# Patient Record
Sex: MALE | Race: WHITE | NOT HISPANIC OR LATINO | Employment: OTHER | URBAN - METROPOLITAN AREA
[De-identification: names, ages, dates, MRNs, and addresses within clinical notes are randomized per-mention and may not be internally consistent; named-entity substitution may affect disease eponyms.]

---

## 2017-01-05 ENCOUNTER — OFFICE VISIT (OUTPATIENT)
Dept: PODIATRY | Facility: CLINIC | Age: 77
End: 2017-01-05
Payer: COMMERCIAL

## 2017-01-05 VITALS
TEMPERATURE: 98 F | HEIGHT: 66 IN | HEART RATE: 98 BPM | BODY MASS INDEX: 39.9 KG/M2 | WEIGHT: 248.25 LBS | SYSTOLIC BLOOD PRESSURE: 140 MMHG | DIASTOLIC BLOOD PRESSURE: 77 MMHG

## 2017-01-05 DIAGNOSIS — L97.529: Primary | ICD-10-CM

## 2017-01-05 DIAGNOSIS — I73.9 PVD (PERIPHERAL VASCULAR DISEASE): ICD-10-CM

## 2017-01-05 DIAGNOSIS — E11.51 TYPE II DIABETES MELLITUS WITH PERIPHERAL CIRCULATORY DISORDER: ICD-10-CM

## 2017-01-05 PROCEDURE — 99999 PR PBB SHADOW E&M-EST. PATIENT-LVL III: CPT | Mod: PBBFAC,,, | Performed by: PODIATRIST

## 2017-01-05 PROCEDURE — 1157F ADVNC CARE PLAN IN RCRD: CPT | Mod: S$GLB,,, | Performed by: PODIATRIST

## 2017-01-05 PROCEDURE — 1126F AMNT PAIN NOTED NONE PRSNT: CPT | Mod: S$GLB,,, | Performed by: PODIATRIST

## 2017-01-05 PROCEDURE — 3078F DIAST BP <80 MM HG: CPT | Mod: S$GLB,,, | Performed by: PODIATRIST

## 2017-01-05 PROCEDURE — 99213 OFFICE O/P EST LOW 20 MIN: CPT | Mod: 25,S$GLB,, | Performed by: PODIATRIST

## 2017-01-05 PROCEDURE — 3077F SYST BP >= 140 MM HG: CPT | Mod: S$GLB,,, | Performed by: PODIATRIST

## 2017-01-05 PROCEDURE — 11042 DBRDMT SUBQ TIS 1ST 20SQCM/<: CPT | Mod: S$GLB,,, | Performed by: PODIATRIST

## 2017-01-05 PROCEDURE — 1160F RVW MEDS BY RX/DR IN RCRD: CPT | Mod: S$GLB,,, | Performed by: PODIATRIST

## 2017-01-05 PROCEDURE — 1159F MED LIST DOCD IN RCRD: CPT | Mod: S$GLB,,, | Performed by: PODIATRIST

## 2017-01-05 PROCEDURE — 4010F ACE/ARB THERAPY RXD/TAKEN: CPT | Mod: S$GLB,,, | Performed by: PODIATRIST

## 2017-01-05 PROCEDURE — 3044F HG A1C LEVEL LT 7.0%: CPT | Mod: S$GLB,,, | Performed by: PODIATRIST

## 2017-01-05 RX ORDER — HYDROCODONE BITARTRATE AND ACETAMINOPHEN 7.5; 325 MG/1; MG/1
TABLET ORAL
COMMUNITY
Start: 2016-12-28 | End: 2017-02-03

## 2017-01-05 RX ORDER — ERTAPENEM SODIUM 1 G/1
INJECTION, POWDER, LYOPHILIZED, FOR SOLUTION INTRAMUSCULAR; INTRAVENOUS
COMMUNITY
Start: 2016-12-30 | End: 2017-11-30 | Stop reason: ALTCHOICE

## 2017-01-05 RX ORDER — DIPHENHYDRAMINE HYDROCHLORIDE 50 MG/ML
INJECTION INTRAMUSCULAR; INTRAVENOUS
COMMUNITY
Start: 2016-12-27 | End: 2018-10-26

## 2017-01-05 RX ORDER — VANCOMYCIN HYDROCHLORIDE 1 G/200ML
INJECTION, SOLUTION INTRAVENOUS
COMMUNITY
Start: 2016-12-30 | End: 2017-11-30 | Stop reason: ALTCHOICE

## 2017-01-05 NOTE — MR AVS SNAPSHOT
[facility] - Podiatry  [address] [place] [address]  Phone: [phone]  Fax: [phone]                  Raymond Stuart   2017 3:40 PM   Office Visit    Description:  Male : 1940   Provider:  Nic Castillo DPM   Department:  [facility] - Podiatry           Reason for Visit     Foot Ulcer     Diabetes Mellitus                To Do List           Future Appointments        Provider Department Dept Phone    2017 1:30 PM Adele Gomez MD [facility] Dermatology [phone]    2017 3:40 PM Nic Castillo DPM [facility] Podiatry [phone]    2017 7:35 AM LAB[facility]ATORY, [facility] [phone]    2017 7:40 AM SPECIMEN, [facility] [phone]    2017 7:00 AM DELIA Cristobal Jr., MD [facility] Internal Medicine [phone]      Goals (5 Years of Data)     None      [facility]     [facility] On Call Nurse Care Line -  Assistance  Registered nurses in the [facility] On Call Center provide clinical advisement, health education, appointment booking, and other advisory services.  Call for this free service at [phone].             Medications           Message regarding Medications     Verify the changes and/or additions to your medication regime listed below are the same as discussed with your clinician today.  If any of these changes or additions are incorrect, please notify your healthcare provider.             Verify that the below list of medications is an accurate representation of the medications you are currently taking.  If none reported, the list may be blank. If incorrect, please contact your healthcare provider. Carry this list with you in case of emergency.           Current Medications     atenolol (TENORMIN) 100 MG tablet Take 1 tablet by mouth  daily    clopidogrel (PLAVIX) 75 mg tablet Take 1 tablet by mouth  daily    diphenhydrAMINE (BENADRYL) 50 mg/mL injection     fenofibrate micronized (LOFIBRA) 134 MG  "Cap Take 1 capsule (134 mg total) by mouth once daily.    gabapentin (NEURONTIN) 600 MG tablet Take 2 tablets (1,200 mg total) by mouth 2 (two) times daily.    glipizide-metformin (METAGLIP) 2.5-500 mg per tablet Take 2 tablets by mouth 2 (two) times daily before meals. 2 po qam, 1 po qpm    hydrocodone-acetaminophen 7.5-325mg (NORCO) 7.5-325 mg per tablet     INVANZ 1 gram injection     lisinopril-hydrochlorothiazide (PRINZIDE,ZESTORETIC) 20-12.5 mg per tablet Take 2 tablets by mouth once daily.    niacin 1,000 mg TbSR Take 1 tablet by mouth Daily.    rosuvastatin (CRESTOR) 40 MG Tab Take 1 tablet (40 mg total) by mouth once daily.    SANTYL ointment     VANCOMYCIN HCL IN DEXTROSE 5 % (VANCOMYCIN IN DEXTROSE 5 %) 1 gram/200 mL PgBk     warfarin (COUMADIN) 5 MG tablet TAKE 1 AND 1/2 TABLETS BY  MOUTH ON WEDNESDAY AND  SUNDAY AND 1 TABLET ALL  REMAINING DAY AS DIRECTED  BY THE COUMADIN CLINIC.           Clinical Reference Information           Vital Signs - Last Recorded  Most recent update: 1/5/2017  4:06 PM by Cheo Dowd LPN    BP Pulse Temp Ht Wt BMI    (!) 140/77 (BP Location: Right arm, Patient Position: Sitting, BP Method: Automatic) 98 98.3 °F (36.8 °C) (Oral) 5' 6" (1.676 m) 112.6 kg (248 lb 3.8 oz) 40.07 kg/m2      Blood Pressure          Most Recent Value    BP  (!)  140/77      Allergies as of 1/5/2017     Sulfa (Sulfonamide Antibiotics)    Sulfamethoxazole    Trimethoprim      Immunizations Administered on Date of Encounter - 1/5/2017     None      [facility] Sign-Up     Activating your [facility] account is as easy as 1-2-3!     1) Visit [email or web address], select Sign Up Now, enter this activation code and your date of birth, then select Next.  [email or web address]  Expires: 2/19/2017  4:36 PM      2) Create a username and password to use when you visit [facility] in the future and select a security question in case you lose your password and select Next.    3) Enter your e-mail address and click Sign " Up!    Additional Information  If you have questions, please e-mail [email or web address] or call [phone] to talk to our [facility] staff. Remember, [facility] is NOT to be used for urgent needs. For medical emergencies, dial 911.

## 2017-01-05 NOTE — PROGRESS NOTES
Subjective: This 76 year old male presents today for evaluation of ulcer left foot. he noticed ulcer several weeks ago. Patient states he has been using applying sanytl ,and patient feels it is working. he states that his blood sugar was 111mg/dl when he checked it prior to coming into clinic.  Patient has no other pedal complaints.  HPI: Patient is accompanied by his daughter today. Patient states he was seen by Dr. Gibson and referred to me. Patient states home health is coming out and changing dressing. Patient states he  had procedure with Dr. Judge performed on December 22.      Chief Complaint   Patient presents with    Foot Ulcer     Left foot( top of 5th toe and bottom of foot). Patient states no current pain. Both uclers appear to be open.     Diabetes Mellitus     Last PCP visit with -11/16/16 . 111 mg/dL-Today's glucose.     PCP: Dr. Cristobal(11/16/16)    Patient Active Problem List   Diagnosis    Hyperlipidemia associated with type 2 diabetes mellitus    Essential hypertension    A-fib    PVD (peripheral vascular disease)    Special screening for malignant neoplasms, colon    S/P femoral-popliteal bypass surgery    S/P peripheral artery angioplasty    Type 2 diabetes mellitus with microalbuminuria    Type 2 diabetes mellitus with diabetic neuropathy       Current Outpatient Prescriptions on File Prior to Visit   Medication Sig Dispense Refill    atenolol (TENORMIN) 100 MG tablet Take 1 tablet by mouth  daily 90 tablet 3    clopidogrel (PLAVIX) 75 mg tablet Take 1 tablet by mouth  daily 90 tablet 3    fenofibrate micronized (LOFIBRA) 134 MG Cap Take 1 capsule (134 mg total) by mouth once daily. 90 capsule 3    gabapentin (NEURONTIN) 600 MG tablet Take 2 tablets (1,200 mg total) by mouth 2 (two) times daily. 180 tablet 3    glipizide-metformin (METAGLIP) 2.5-500 mg per tablet Take 2 tablets by mouth 2 (two) times daily before meals. 2 po qam, 1 po qpm 360 tablet 3     "lisinopril-hydrochlorothiazide (PRINZIDE,ZESTORETIC) 20-12.5 mg per tablet Take 2 tablets by mouth once daily. 180 tablet 3    niacin 1,000 mg TbSR Take 1 tablet by mouth Daily.      rosuvastatin (CRESTOR) 40 MG Tab Take 1 tablet (40 mg total) by mouth once daily. 90 tablet 3    SANTYL ointment       warfarin (COUMADIN) 5 MG tablet TAKE 1 AND 1/2 TABLETS BY  MOUTH ON WEDNESDAY AND  SUNDAY AND 1 TABLET ALL  REMAINING DAY AS DIRECTED  BY THE COUMADIN CLINIC. (Patient taking differently: TAKE 1 tablet daily AS DIRECTED  BY THE COUMADIN CLINIC.) 96 tablet 11     No current facility-administered medications on file prior to visit.        Review of patient's allergies indicates:   Allergen Reactions    Sulfa (sulfonamide antibiotics)      Other reaction(s): Stomach upset    Sulfamethoxazole Rash    Trimethoprim Rash       Past Surgical History   Procedure Laterality Date    Rectoperitoneal fistula closure      Tonsillectomy      Toe surgery       Joint release    Popliteal artery stent  2013    Cardiac electrophysiology mapping and ablation         History reviewed. No pertinent family history.    Social History     Social History    Marital status:      Spouse name: N/A    Number of children: N/A    Years of education: N/A     Occupational History    Retired [employer]     Social History Main Topics    Smoking status: Former Smoker     Packs/day: 4.00     Years: 30.00     Types: Cigarettes     Quit date: 1/9/1994    Smokeless tobacco: Never Used    Alcohol use No    Drug use: No    Sexual activity: Not on file     Other Topics Concern    Not on file     Social History Narrative     Vitals:    01/05/17 1559   BP: (!) 140/77   Pulse: 98   Temp: 98.3 °F (36.8 °C)   TempSrc: Oral   Weight: 112.6 kg (248 lb 3.8 oz)   Height: 5' 6" (1.676 m)   PainSc: 0-No pain     Hemoglobin A1C   Date Value Ref Range Status   11/09/2016 6.9 (H) 4.5 - 6.2 % Final     Comment:     According to ADA " guidelines, hemoglobin A1C <7.0% represents  optimal control in non-pregnant diabetic patients.  Different  metrics may apply to specific populations.   Standards of Medical Care in Diabetes - 2016.  For the purpose of screening for the presence of diabetes:  <5.7%     Consistent with the absence of diabetes  5.7-6.4%  Consistent with increasing risk for diabetes   (prediabetes)  >or=6.5%  Consistent with diabetes  Currently no consensus exists for use of hemoglobin A1C  for diagnosis of diabetes for children.     08/10/2016 8.1 (H) 4.5 - 6.2 % Final     Comment:     According to ADA guidelines, hemoglobin A1C <7.0% represents  optimal control in non-pregnant diabetic patients.  Different  metrics may apply to specific populations.   Standards of Medical Care in Diabetes - 2016.  For the purpose of screening for the presence of diabetes:  <5.7%     Consistent with the absence of diabetes  5.7-6.4%  Consistent with increasing risk for diabetes   (prediabetes)  >or=6.5%  Consistent with diabetes  Currently no consensus exists for use of hemoglobin A1C  for diagnosis of diabetes for children.     05/09/2016 8.0 (H) 4.5 - 6.2 % Final     REVIEW OF SYSTEMS:  General: Denies any fever or chills  Chest: Denies shortness of breath, wheezing, coughing, or sputum production  Heart: Denies chest pain, cold extremities, orthopenia, or reduced exercise tolerance    OBJECTIVE:   PHYSICAL EXAM: Apperance: Alert and orient in no distress,well developed, and with good attention to grooming and body habits  Lower Extremity Exam  VASCULAR: Dorsalis pedis pulses 0/4 bilateral and Posterior Tibial pulses 0/4 bilateral. Capillary fill time <4 seconds bilateral. No edema observed bilateral. Varicosities present bilateral. Skin temperature of the lower extremities is warm to cool, proximal to distal. Hair growth absent bilateral. (--) lymphangitis or (--) cellulitis noted left.  DERMATOLOGICAL: (--) edema, (+) periwound erythema, (--)  malodor, (+) serous drainage, (--) warmth to left foot.  Ulcer noted to left dorsal 5th toe and plantar 5th submetatarsal with mixed eschar base and thin granular rim and with a 1 mm yellow/white border and hyperkeratosis surrounding ulcer. Left sub-fifth met head 1.0 x 0.5 x 0. 2 cm down to dry fibrotic tissue base and left dorsal lateral fifth digit 0.5 x 1.0 x 0.3 cm. The ulcer does not extend into deeper tissue and (--) sinus tracts exist.  The dorsum surface of the feet are soft and supple.  The plantar aspects of both feet are dry and scaly. Nails 1-5 bilateral dystrophic, mycotic. Webspaces 1-4 clean, dry and without evidence of break in skin integrity bilateral.   NEUROLOGICAL: Light touch, sharp-dull, proprioception all present and equal bilaterally. Protective sensation absent at sites as tested with a Erie-Jabari 5.07 monofilament.   MUSCULOSKELETAL: Muscle strength is 5/5 for foot inverters, everters, plantarflexors, and dorsiflexors. Muscle tone is normal.  Inspection/palpation of bone, joints and muscles unremarkable with the exception of that noted above.            ASSESSMENT:  1.Ischemic/Neurotrophic ulceration of left  foot   - without signs of infection  2. Diabetes mellitus with PVD  3. Mycotic toenails, bilateral    TREATMENT/PLAN: Treatment today consisted of the followin. The patient was counseled regarding findings of my examination, my impressions, treatment options, results of diagnostic tests and usual treatment plan.   2. With patient's permission, the left foot ulcer was sharply excisionally debrided of viable and nonviable tissue down to good bleeding granular tissue base utilizing sterile #15 blade and tissue nipper and forceps. Wound was irrigated with sterile saline and bleeding was controlled with direct pressure. Minimal blood loss. The patient tolerated this well. Wound was then dressed with normlgel, gauze, kerlix, ACE. Patient was given instructions on daily dressing  changes. Patient was also instructed on the importance of keeping the wound and dressings clean dry and intact and keeping pressure off the wound area until complete healing of the wound.The patient is alerted to watch for any signs of infection (redness, pus, pain, increased swelling or fever) and call if such occurs. Home wound care instructions are provided.  3. Patient instructed to continue with Santyl and home health.   4,. Patient instructed to continue IV antibiotics as per Dr. Hernández with infectious disease.   5. Shoe inspection. Diabetic Foot Education. Patient reminded of the importance of good nutrition and blood sugar control to help prevent podiatric complications of diabetes. Patient instructed on proper foot hygeine. We discussed wearing proper shoe gear, daily foot inspections, never walking without protective shoe gear, never putting sharp instruments to feet.    6. Patient  will continue to monitor the areas daily, inspect feet, wear protective shoe gear when ambulatory, moisturizer to maintain skin integrity. Patient reminded of the importance of good nutrition and blood sugar control to help prevent podiatric complications of diabetes.  7. Patient to return in 2 weeks or sooner if needed.

## 2017-01-09 LAB — INR PPP: 3.1

## 2017-01-10 ENCOUNTER — ANTI-COAG VISIT (OUTPATIENT)
Dept: CARDIOLOGY | Facility: CLINIC | Age: 77
End: 2017-01-10

## 2017-01-10 DIAGNOSIS — I48.20 CHRONIC ATRIAL FIBRILLATION: ICD-10-CM

## 2017-01-10 NOTE — PROGRESS NOTES
Verbal result taken from __Susy/Kami _______. PT/INR __31.9/3.1_____ Date drawn__1/19_____ patient remains on IV vancomycin and invanz until February. No other reported changes. Begin 2.5mg on Tuesdays and Thursdays and 5mg all other days. Repeat INR in 3 weeks. Orders faxed. Left message for patient with this information.

## 2017-01-16 ENCOUNTER — INITIAL CONSULT (OUTPATIENT)
Dept: DERMATOLOGY | Facility: CLINIC | Age: 77
End: 2017-01-16
Payer: COMMERCIAL

## 2017-01-16 DIAGNOSIS — Z85.828 HISTORY OF SKIN CANCER: ICD-10-CM

## 2017-01-16 DIAGNOSIS — L57.0 ACTINIC KERATOSES: ICD-10-CM

## 2017-01-16 DIAGNOSIS — L90.5 SCAR CONDITIONS/SKIN FIBROSIS: Primary | ICD-10-CM

## 2017-01-16 DIAGNOSIS — Z12.83 SCREENING, MALIGNANT NEOPLASM, SKIN: ICD-10-CM

## 2017-01-16 DIAGNOSIS — D48.5 NEOPLASM OF UNCERTAIN BEHAVIOR OF SKIN: ICD-10-CM

## 2017-01-16 PROCEDURE — 99202 OFFICE O/P NEW SF 15 MIN: CPT | Mod: 25,S$GLB,, | Performed by: DERMATOLOGY

## 2017-01-16 PROCEDURE — 88305 TISSUE EXAM BY PATHOLOGIST: CPT | Performed by: PATHOLOGY

## 2017-01-16 PROCEDURE — 11100 PR BIOPSY OF SKIN LESION: CPT | Mod: 59,S$GLB,, | Performed by: DERMATOLOGY

## 2017-01-16 PROCEDURE — 88305 TISSUE EXAM BY PATHOLOGIST: CPT | Mod: 26,,, | Performed by: PATHOLOGY

## 2017-01-16 PROCEDURE — 1160F RVW MEDS BY RX/DR IN RCRD: CPT | Mod: S$GLB,,, | Performed by: DERMATOLOGY

## 2017-01-16 PROCEDURE — 1157F ADVNC CARE PLAN IN RCRD: CPT | Mod: S$GLB,,, | Performed by: DERMATOLOGY

## 2017-01-16 PROCEDURE — 17004 DESTROY PREMAL LESIONS 15/>: CPT | Mod: S$GLB,,, | Performed by: DERMATOLOGY

## 2017-01-16 PROCEDURE — 99999 PR PBB SHADOW E&M-EST. PATIENT-LVL II: CPT | Mod: PBBFAC,,, | Performed by: DERMATOLOGY

## 2017-01-16 PROCEDURE — 1159F MED LIST DOCD IN RCRD: CPT | Mod: S$GLB,,, | Performed by: DERMATOLOGY

## 2017-01-16 NOTE — MR AVS SNAPSHOT
[facility] Dermatology  [address] [place] [address]  Phone: [phone]  Fax: [phone]                  Raymond Stuart   2017 1:30 PM   Initial consult    Description:  Male : 1940   Provider:  Adele Gomez MD   Department:  [facility] - Dermatology           Reason for Visit     Spot           Diagnoses this Visit        Comments    Scar conditions/skin fibrosis    -  Primary     Screening, malignant neoplasm, skin         History of skin cancer         Actinic keratoses         Neoplasm of uncertain behavior of skin                To Do List           Future Appointments        Provider Department Dept Phone    2017 3:40 PM Nic Castillo DPM [facility] Podiatry [phone]    2017 7:35 AM LAB[facility]ATORY, [facility] [phone]    2017 7:40 AM SPECIMEN, [facility] [phone]    2017 7:00 AM DELIA Cristobal Jr., MD [facility] Internal Medicine [phone]    2017 8:45 AM TAY Long OD [facility] Ophthalmology [phone]      Goals (5 Years of Data)     None      Follow-Up and Disposition     Return for call for results.    Follow-up and Disposition History      [facility]     [facility] Nurse Care Line - 24/7 Assistance  Registered nurses in the [facility] Center provide clinical advisement, health education, appointment booking, and other advisory services.  Call for this free service at [phone].             Medications           Message regarding Medications     Verify the changes and/or additions to your medication regime listed below are the same as discussed with your clinician today.  If any of these changes or additions are incorrect, please notify your healthcare provider.             Verify that the below list of medications is an accurate representation of the medications you are currently taking.  If none reported, the list may be blank. If incorrect, please contact your  healthcare provider. Carry this list with you in case of emergency.           Current Medications     atenolol (TENORMIN) 100 MG tablet Take 1 tablet by mouth  daily    clopidogrel (PLAVIX) 75 mg tablet Take 1 tablet by mouth  daily    diphenhydrAMINE (BENADRYL) 50 mg/mL injection     fenofibrate micronized (LOFIBRA) 134 MG Cap Take 1 capsule (134 mg total) by mouth once daily.    gabapentin (NEURONTIN) 600 MG tablet Take 2 tablets (1,200 mg total) by mouth 2 (two) times daily.    glipizide-metformin (METAGLIP) 2.5-500 mg per tablet Take 2 tablets by mouth 2 (two) times daily before meals. 2 po qam, 1 po qpm    hydrocodone-acetaminophen 7.5-325mg (NORCO) 7.5-325 mg per tablet     INVANZ 1 gram injection     lisinopril-hydrochlorothiazide (PRINZIDE,ZESTORETIC) 20-12.5 mg per tablet Take 2 tablets by mouth once daily.    niacin 1,000 mg TbSR Take 1 tablet by mouth Daily.    rosuvastatin (CRESTOR) 40 MG Tab Take 1 tablet (40 mg total) by mouth once daily.    SANTYL ointment     VANCOMYCIN HCL IN DEXTROSE 5 % (VANCOMYCIN IN DEXTROSE 5 %) 1 gram/200 mL PgBk     warfarin (COUMADIN) 5 MG tablet TAKE 1 AND 1/2 TABLETS BY  MOUTH ON WEDNESDAY AND  SUNDAY AND 1 TABLET ALL  REMAINING DAY AS DIRECTED  BY THE COUMADIN CLINIC.           Clinical Reference Information           Allergies as of 1/16/2017     Sulfa (Sulfonamide Antibiotics)    Sulfamethoxazole    Trimethoprim      Immunizations Administered on Date of Encounter - 1/16/2017     None      Orders Placed During Today's Visit      Normal Orders This Visit    Tissue Specimen To Pathology, Dermatology       [facility] Sign-Up     Activating your [facility] account is as easy as 1-2-3!     1) Visit [email or web address], select Sign Up Now, enter this activation code and your date of birth, then select Next.  [email or web address]  Expires: 2/19/2017  4:36 PM      2) Create a username and password to use when you visit [facility] in the future and select a security question in case you  lose your password and select Next.    3) Enter your e-mail address and click Sign Up!    Additional Information  If you have questions, please e-mail [email or web address] or call [phone] to talk to our [facility] staff. Remember, [facility] is NOT to be used for urgent needs. For medical emergencies, dial 911.         Instructions     Shave Biopsy Wound Care    Your doctor has performed a shave biopsy today.  A band aid and vaseline ointment has been placed over the site.  This should remain in place for 24 hours.  It is recommended that you keep the area dry for the first 24 hours.  After 24 hours, you may remove the band aid and wash the area with warm soap and water and apply Vaseline jelly.  Many patients prefer to use Neosporin or Bacitracin ointment.  This is acceptable; however, know that you can develop an allergy to this medication even if you have used it safely for years.  It is important to keep the area moist.  Letting it dry out and get air slows healing time, and will worsen the scar.  Band aid is optional after first 24 hours.      If you notice increasing redness, tenderness, pain, or yellow drainage at the biopsy site, please notify your doctor.  These are signs of an infection.    If your biopsy site is bleeding, apply firm pressure for 15 minutes straight.  Repeat for another 15 minutes, if it is still bleeding.   If the surgical site continues to bleed, then please contact your doctor.      [facility]   DERMATOLOGY  [address]   [facility] [address]   Dept: [phone]   Dept Fax: [phone]         Preventing Skin Cancer  Relaxing in the sun may feel good. But it isnt good for your skin. In fact, being exposed to the suns harmful rays is a major cause of skin cancer. This is a serious disease that can be life-threatening. People of all ages and backgrounds are at risk. But in most cases, skin cancer can be prevented.    Your Role in  Prevention  You can act today to help prevent skin cancer. Start by avoiding the suns UV (ultraviolet) rays. And dont use tanning beds, which are no safer than the sun. Taking these steps can help keep you from getting skin cancer. It can also help prevent wrinkles and other sun-induced aging effects. Make sure your children also follow these safeguards. Now is the time to start taking preventive steps against skin cancer.  When You Are Outdoors  Protect your skin when you go outdoors during the day. Take precautions whenever you go out to eat, run errands by car or on foot, or do any outdoor activity. There isnt just one easy way to protect your skin. Its best to follow all of these steps:  · Wear tightly woven clothing that covers your skin. Put on a wide-brimmed hat to protect your face, ears, and scalp.  · Watch the clock. Try to avoid the sun between 10 a.m. and 4 p.m., when it is strongest.  · Head for the shade or create your own. Use an umbrella when sitting or strolling.  · Know that the suns rays can reflect off sand, water, and snow. This can harm your skin. Take extra care when you are near reflective surfaces.  · Keep in mind that even when the weather is hazy or cloudy, your skin can be exposed to strong UV rays.  · Shield your skin with sunscreen. Also, apply sunscreen to your childrens skin.  Tips for Using Sunscreen  To help prevent skin cancer, choose the right sunscreen and use it correctly. Try the following tips:  · Choose a sunscreen that has a sun protection factor (SPF) of at least 15. For the best protection, an SPF of at least 30 is preferred. Also, choose a sunscreen labeled broad spectrum. This will shield you from both UVA and UVB (ultraviolet A and B) rays.  · If one brand irritates your skin, try another, particularly ones without fragrance.  · Use a water-resistant sunscreen if swimming or sweating.  · Reapply sunscreen every 2 hours. If youre active, do this more  often.  · Cover any sun-exposed skin, from your face to your feet. Dont forget your ears and your lips.  · Know that while sunscreen helps protect you, it isnt enough. You should also wear protective clothing. And try to stay out of the sun as much as you can, especially from 10 a.m. to 4 p.m.  © 3231-6570 [employer]. [address] [place] [address], [place] [address]. All rights reserved. This information is not intended as a substitute for professional medical care. Always follow your healthcare professional's instructions.

## 2017-01-16 NOTE — PATIENT INSTRUCTIONS
Shave Biopsy Wound Care    Your doctor has performed a shave biopsy today.  A band aid and vaseline ointment has been placed over the site.  This should remain in place for 24 hours.  It is recommended that you keep the area dry for the first 24 hours.  After 24 hours, you may remove the band aid and wash the area with warm soap and water and apply Vaseline jelly.  Many patients prefer to use Neosporin or Bacitracin ointment.  This is acceptable; however, know that you can develop an allergy to this medication even if you have used it safely for years.  It is important to keep the area moist.  Letting it dry out and get air slows healing time, and will worsen the scar.  Band aid is optional after first 24 hours.      If you notice increasing redness, tenderness, pain, or yellow drainage at the biopsy site, please notify your doctor.  These are signs of an infection.    If your biopsy site is bleeding, apply firm pressure for 15 minutes straight.  Repeat for another 15 minutes, if it is still bleeding.   If the surgical site continues to bleed, then please contact your doctor.      [facility]   DERMATOLOGY  [address] [address]   Dept: [phone]   Dept Fax: [phone]         Preventing Skin Cancer  Relaxing in the sun may feel good. But it isnt good for your skin. In fact, being exposed to the suns harmful rays is a major cause of skin cancer. This is a serious disease that can be life-threatening. People of all ages and backgrounds are at risk. But in most cases, skin cancer can be prevented.    Your Role in Prevention  You can act today to help prevent skin cancer. Start by avoiding the suns UV (ultraviolet) rays. And dont use tanning beds, which are no safer than the sun. Taking these steps can help keep you from getting skin cancer. It can also help prevent wrinkles and other sun-induced aging effects. Make sure your children also follow  these safeguards. Now is the time to start taking preventive steps against skin cancer.  When You Are Outdoors  Protect your skin when you go outdoors during the day. Take precautions whenever you go out to eat, run errands by car or on foot, or do any outdoor activity. There isnt just one easy way to protect your skin. Its best to follow all of these steps:  · Wear tightly woven clothing that covers your skin. Put on a wide-brimmed hat to protect your face, ears, and scalp.  · Watch the clock. Try to avoid the sun between 10 a.m. and 4 p.m., when it is strongest.  · Head for the shade or create your own. Use an umbrella when sitting or strolling.  · Know that the suns rays can reflect off sand, water, and snow. This can harm your skin. Take extra care when you are near reflective surfaces.  · Keep in mind that even when the weather is hazy or cloudy, your skin can be exposed to strong UV rays.  · Shield your skin with sunscreen. Also, apply sunscreen to your childrens skin.  Tips for Using Sunscreen  To help prevent skin cancer, choose the right sunscreen and use it correctly. Try the following tips:  · Choose a sunscreen that has a sun protection factor (SPF) of at least 15. For the best protection, an SPF of at least 30 is preferred. Also, choose a sunscreen labeled broad spectrum. This will shield you from both UVA and UVB (ultraviolet A and B) rays.  · If one brand irritates your skin, try another, particularly ones without fragrance.  · Use a water-resistant sunscreen if swimming or sweating.  · Reapply sunscreen every 2 hours. If youre active, do this more often.  · Cover any sun-exposed skin, from your face to your feet. Dont forget your ears and your lips.  · Know that while sunscreen helps protect you, it isnt enough. You should also wear protective clothing. And try to stay out of the sun as much as you can, especially from 10 a.m. to 4 p.m.  © 0108-4031 [employer]. [address] [place] [address]  [place] [address]. All rights reserved. This information is not intended as a substitute for professional medical care. Always follow your healthcare professional's instructions.

## 2017-01-16 NOTE — PROGRESS NOTES
Subjective:       Patient ID:  Raymond Stuart is a 76 y.o. male who presents for   Chief Complaint   Patient presents with    Spot     c/o spot on left forearm x several yrs      HPI Comments: Hx of NMSC of the right ear and left cheek    History of Present Illness: The patient presents with chief complaint of lesions.  Location: left forearm  Duration: 1 year  Signs/Symptoms: crusted, painful    Prior treatments: none      Spot         Review of Systems   Constitutional: Negative for fever and chills.   Gastrointestinal: Negative for nausea and vomiting.   Skin: Negative for daily sunscreen use, activity-related sunscreen use and recent sunburn.   Hematologic/Lymphatic: Does not bruise/bleed easily.        Objective:    Physical Exam   Constitutional: He appears well-developed and well-nourished. No distress.   Neurological: He is alert and oriented to person, place, and time. He is not disoriented.   Psychiatric: He has a normal mood and affect.   Skin:   Areas Examined (abnormalities noted in diagram):   Scalp / Hair Palpated and Inspected  Head / Face Inspection Performed  Neck Inspection Performed  Chest / Axilla Inspection Performed  Abdomen Inspection Performed  Back Inspection Performed  RUE Inspected  LUE Inspection Performed  Nails and Digits Inspection Performed                       Diagram Legend     Erythematous scaling macule/papule c/w actinic keratosis       Pigmented verrucoid papule/plaque c/w seborrheic keratosis      Surgical scar with no sign of skin cancer recurrence                   Assessment / Plan:      Pathology Orders:      Normal Orders This Visit    Tissue Specimen To Pathology, Dermatology     Questions:    Directional Terms:  Other(comment)    Clinical information:  ulcerated BCC vs. SCC    Specific Site:  left temple        Scar conditions/skin fibrosis  Screening, malignant neoplasm, skin  History of skin cancer  Scar of the right ear, hx of NMSC.  No evidence of recurrence on  physical exam today.  Continue routine skin surveillance. Daily sunscreen advised.    Actinic keratoses  Cryosurgery Procedure Note    The patient is informed of the precancerous quality and need for treatment of these lesions. After risks, benefits and alternatives explained, including blistering, pain, hyper- and hypopigmentation, patient verbally consents to cryotherapy to precancerous lesions. Liquid nitrogen cryosurgery is applied to the 36 actinic keratoses, as detailed in the physical exam, to produce a freeze injury. The patient is aware that blisters may form and is instructed on wound care with gentle cleansing and use of vaseline ointment to keep moist until healed. The patient is supplied a handout on cryosurgery and is instructed to call if lesions do not completely resolve.    Neoplasm of uncertain behavior of skin  -     Tissue Specimen To Pathology, Dermatology  -     Shave biopsy(-ies) done of 1 site(s).   Patient informed to call for results within 2 weeks if have not received notification via telephone call or [facility]           Return for call for results.     PROCEDURE NOTE - SHAVE BIOPSY   Location: left temple    After risk, benefits, and alternatives were discussed with the patient, the patient agrees to the procedure by verbal informed consent.  The area(s) were cleansed with alcohol. 2 cc of lidocaine 1% with epinephrine was injected for local anesthesia into each lesion(s).  A sharp dermablade was used to remove part or all of the lesion(s).  The specimen(s) will be sent for tissue pathology.  Hemostasis was obtained with aluminum chloride and/or hyfrecation.  The area(s) were dressed with vaseline ointment and bandaged.  The patient tolerated the procedure well without adverse events.  Wound care instructions were given to the patient on the AVS.  The patient will be notified of pathology results once available. Results will also be available in Epic.

## 2017-01-16 NOTE — LETTER
January 16, 2017      Nic Castillo DPM  [address] [place] [address]           [facility]at[facility]  [address] [place] [address]  Phone: [phone]  Fax: [phone]          Patient: Raymond Stuart   MR Number: [id number]   YOB: 1940   Date of Visit: 1/16/2017       Dear Dr. Nic Castillo:    Thank you for referring Raymond Stuart to me for evaluation. Attached you will find relevant portions of my assessment and plan of care.    If you have questions, please do not hesitate to call me. I look forward to following Raymond Stuart along with you.    Sincerely,    Adele Gomez MD    Enclosure  CC:  No Recipients    If you would like to receive this communication electronically, please contact [email or web address] or [phone] to request more information on [employer] Link access.    For providers and/or their staff who would like to refer a patient to [facility], please contact us through our one-stop-shop provider referral line, [facility], at [phone].    If you feel you have received this communication in error or would no longer like to receive these types of communications, please e-mail [email or web address]

## 2017-01-20 ENCOUNTER — OFFICE VISIT (OUTPATIENT)
Dept: PODIATRY | Facility: CLINIC | Age: 77
End: 2017-01-20
Payer: COMMERCIAL

## 2017-01-20 VITALS
DIASTOLIC BLOOD PRESSURE: 69 MMHG | BODY MASS INDEX: 39.86 KG/M2 | WEIGHT: 248 LBS | HEART RATE: 90 BPM | HEIGHT: 66 IN | SYSTOLIC BLOOD PRESSURE: 125 MMHG

## 2017-01-20 DIAGNOSIS — L97.529: Primary | ICD-10-CM

## 2017-01-20 DIAGNOSIS — E11.51 TYPE II DIABETES MELLITUS WITH PERIPHERAL CIRCULATORY DISORDER: ICD-10-CM

## 2017-01-20 DIAGNOSIS — I73.9 PVD (PERIPHERAL VASCULAR DISEASE): ICD-10-CM

## 2017-01-20 PROCEDURE — 99999 PR PBB SHADOW E&M-EST. PATIENT-LVL III: CPT | Mod: PBBFAC,,, | Performed by: PODIATRIST

## 2017-01-20 PROCEDURE — 11042 DBRDMT SUBQ TIS 1ST 20SQCM/<: CPT | Mod: S$GLB,,, | Performed by: PODIATRIST

## 2017-01-20 PROCEDURE — 99499 UNLISTED E&M SERVICE: CPT | Mod: S$GLB,,, | Performed by: PODIATRIST

## 2017-01-20 NOTE — MR AVS SNAPSHOT
[facility] - Podiatry  [address] [place] [address]  Phone: [phone]  Fax: [phone]                  Raymond Stuart   2017 3:40 PM   Office Visit    Description:  Male : 1940   Provider:  Nic Castillo DPM   Department:  [facility] - Podiatry           Reason for Visit     Foot Ulcer     Diabetes Mellitus                To Do List           Future Appointments        Provider Department Dept Phone    2/3/2017 3:40 PM Nic Castillo DPM [facility] Podiatry [phone]    2017 7:35 AM LABORATORY, [facility] [phone]    2017 7:40 AM SPECIMEN, [facility] [phone]    2017 7:00 AM DELIA Cristobal Jr., MD [facility] Internal Medicine [phone]    2017 8:45 AM TAY Long OD [facility] Ophthalmology [phone]      Goals (5 Years of Data)     None      [facility] On Call     [facility] On [facility] Nurse Care Line -  Assistance  Registered nurses in the [facility] Center provide clinical advisement, health education, appointment booking, and other advisory services.  Call for this free service at [phone].             Medications           Message regarding Medications     Verify the changes and/or additions to your medication regime listed below are the same as discussed with your clinician today.  If any of these changes or additions are incorrect, please notify your healthcare provider.             Verify that the below list of medications is an accurate representation of the medications you are currently taking.  If none reported, the list may be blank. If incorrect, please contact your healthcare provider. Carry this list with you in case of emergency.           Current Medications     atenolol (TENORMIN) 100 MG tablet Take 1 tablet by mouth  daily    clopidogrel (PLAVIX) 75 mg tablet Take 1 tablet by mouth  daily    diphenhydrAMINE (BENADRYL) 50 mg/mL injection     fenofibrate micronized (LOFIBRA) 134  "MG Cap Take 1 capsule (134 mg total) by mouth once daily.    gabapentin (NEURONTIN) 600 MG tablet Take 2 tablets (1,200 mg total) by mouth 2 (two) times daily.    glipizide-metformin (METAGLIP) 2.5-500 mg per tablet Take 2 tablets by mouth 2 (two) times daily before meals. 2 po qam, 1 po qpm    hydrocodone-acetaminophen 7.5-325mg (NORCO) 7.5-325 mg per tablet     INVANZ 1 gram injection     lisinopril-hydrochlorothiazide (PRINZIDE,ZESTORETIC) 20-12.5 mg per tablet Take 2 tablets by mouth once daily.    niacin 1,000 mg TbSR Take 1 tablet by mouth Daily.    rosuvastatin (CRESTOR) 40 MG Tab Take 1 tablet (40 mg total) by mouth once daily.    SANTYL ointment     VANCOMYCIN HCL IN DEXTROSE 5 % (VANCOMYCIN IN DEXTROSE 5 %) 1 gram/200 mL PgBk     warfarin (COUMADIN) 5 MG tablet TAKE 1 AND 1/2 TABLETS BY  MOUTH ON WEDNESDAY AND  SUNDAY AND 1 TABLET ALL  REMAINING DAY AS DIRECTED  BY THE COUMADIN CLINIC.           Clinical Reference Information           Vital Signs - Last Recorded  Most recent update: 1/20/2017  3:58 PM by Cheo Dowd LPN    BP Pulse Ht Wt BMI    125/69 (BP Location: Right arm, Patient Position: Sitting, BP Method: Automatic) 90 5' 6" (1.676 m) 112.5 kg (248 lb 0.3 oz) 40.03 kg/m2      Blood Pressure          Most Recent Value    BP  125/69      Allergies as of 1/20/2017     Sulfa (Sulfonamide Antibiotics)    Sulfamethoxazole    Trimethoprim      Immunizations Administered on Date of Encounter - 1/20/2017     None      [facility] Sign-Up     Activating your [facility] account is as easy as 1-2-3!     1) Visit [email or web address], select Sign Up Now, enter this activation code and your date of birth, then select Next.  [email or web address]  Expires: 2/19/2017  4:36 PM      2) Create a username and password to use when you visit [facility] in the future and select a security question in case you lose your password and select Next.    3) Enter your e-mail address and click Sign Up!    Additional Information  If " you have questions, please e-mail [email or web address] or call [phone] to talk to our [facility] staff. Remember, [facility] is NOT to be used for urgent needs. For medical emergencies, dial 911.

## 2017-01-23 LAB — INR PPP: 2.1

## 2017-01-24 ENCOUNTER — ANTI-COAG VISIT (OUTPATIENT)
Dept: CARDIOLOGY | Facility: CLINIC | Age: 77
End: 2017-01-24

## 2017-01-24 DIAGNOSIS — I48.20 CHRONIC ATRIAL FIBRILLATION: ICD-10-CM

## 2017-01-24 NOTE — PROGRESS NOTES
Faxed result taken from __Kika/Kami _______. PT/INR __24.2/2.19_____ Date drawn___1/23_____ Continue dose. Repeat INR in 4 weeks. Orders faxed.

## 2017-01-25 NOTE — PROGRESS NOTES
Subjective: This 76 year old male presents today for evaluation of ulcer left foot. he noticed ulcer several weeks ago. Patient states he has been using applying santyl ,and patient feels it is working. he states that his blood sugar was 117mg/dl when he checked it prior to coming into clinic.  Patient has no other pedal complaints.  HPI: Patient is accompanied by his daughter today. Patient states he was seen by Dr. Gibson and referred to me. Patient states home health is coming out and changing dressing. Patient states he  had procedure with Dr. Judge performed on December 22. Patient to see Dr. Judge this coming Monday.      Chief Complaint   Patient presents with    Foot Ulcer     Left foot ulcer (top of 5th toe/bottom lateral side). Patient rates the current pain 5/10..    Diabetes Mellitus     Last PCP visit with -11/16/16 . 117 mg/dL-today's glucose.      PCP: Dr. Cristobal(11/16/16)    Patient Active Problem List   Diagnosis    Hyperlipidemia associated with type 2 diabetes mellitus    Essential hypertension    A-fib    PVD (peripheral vascular disease)    Special screening for malignant neoplasms, colon    S/P femoral-popliteal bypass surgery    S/P peripheral artery angioplasty    Type 2 diabetes mellitus with microalbuminuria    Type 2 diabetes mellitus with diabetic neuropathy       Current Outpatient Prescriptions on File Prior to Visit   Medication Sig Dispense Refill    atenolol (TENORMIN) 100 MG tablet Take 1 tablet by mouth  daily 90 tablet 3    clopidogrel (PLAVIX) 75 mg tablet Take 1 tablet by mouth  daily 90 tablet 3    diphenhydrAMINE (BENADRYL) 50 mg/mL injection       fenofibrate micronized (LOFIBRA) 134 MG Cap Take 1 capsule (134 mg total) by mouth once daily. 90 capsule 3    gabapentin (NEURONTIN) 600 MG tablet Take 2 tablets (1,200 mg total) by mouth 2 (two) times daily. 180 tablet 3    glipizide-metformin (METAGLIP) 2.5-500 mg per tablet Take 2 tablets by mouth 2  (two) times daily before meals. 2 po qam, 1 po qpm 360 tablet 3    hydrocodone-acetaminophen 7.5-325mg (NORCO) 7.5-325 mg per tablet       INVANZ 1 gram injection       lisinopril-hydrochlorothiazide (PRINZIDE,ZESTORETIC) 20-12.5 mg per tablet Take 2 tablets by mouth once daily. 180 tablet 3    niacin 1,000 mg TbSR Take 1 tablet by mouth Daily.      rosuvastatin (CRESTOR) 40 MG Tab Take 1 tablet (40 mg total) by mouth once daily. 90 tablet 3    SANTYL ointment       VANCOMYCIN HCL IN DEXTROSE 5 % (VANCOMYCIN IN DEXTROSE 5 %) 1 gram/200 mL PgBk       warfarin (COUMADIN) 5 MG tablet TAKE 1 AND 1/2 TABLETS BY  MOUTH ON WEDNESDAY AND  SUNDAY AND 1 TABLET ALL  REMAINING DAY AS DIRECTED  BY THE COUMADIN CLINIC. (Patient taking differently: TAKE 1 tablet daily AS DIRECTED  BY THE COUMADIN CLINIC.) 96 tablet 11     No current facility-administered medications on file prior to visit.        Review of patient's allergies indicates:   Allergen Reactions    Sulfa (sulfonamide antibiotics)      Other reaction(s): Stomach upset    Sulfamethoxazole Rash    Trimethoprim Rash       Past Surgical History   Procedure Laterality Date    Rectoperitoneal fistula closure      Tonsillectomy      Toe surgery       Joint release    Popliteal artery stent  2013    Cardiac electrophysiology mapping and ablation         History reviewed. No pertinent family history.    Social History     Social History    Marital status:      Spouse name: N/A    Number of children: N/A    Years of education: N/A     Occupational History    Retired [employer]     Social History Main Topics    Smoking status: Former Smoker     Packs/day: 4.00     Years: 30.00     Types: Cigarettes     Quit date: 1/9/1994    Smokeless tobacco: Never Used    Alcohol use No    Drug use: No    Sexual activity: Not on file     Other Topics Concern    Not on file     Social History Narrative     Vitals:    01/20/17 1552   BP: 125/69   Pulse:  "90   Weight: 112.5 kg (248 lb 0.3 oz)   Height: 5' 6" (1.676 m)   PainSc:   5   PainLoc: Foot     Hemoglobin A1C   Date Value Ref Range Status   11/09/2016 6.9 (H) 4.5 - 6.2 % Final     Comment:     According to ADA guidelines, hemoglobin A1C <7.0% represents  optimal control in non-pregnant diabetic patients.  Different  metrics may apply to specific populations.   Standards of Medical Care in Diabetes - 2016.  For the purpose of screening for the presence of diabetes:  <5.7%     Consistent with the absence of diabetes  5.7-6.4%  Consistent with increasing risk for diabetes   (prediabetes)  >or=6.5%  Consistent with diabetes  Currently no consensus exists for use of hemoglobin A1C  for diagnosis of diabetes for children.     08/10/2016 8.1 (H) 4.5 - 6.2 % Final     Comment:     According to ADA guidelines, hemoglobin A1C <7.0% represents  optimal control in non-pregnant diabetic patients.  Different  metrics may apply to specific populations.   Standards of Medical Care in Diabetes - 2016.  For the purpose of screening for the presence of diabetes:  <5.7%     Consistent with the absence of diabetes  5.7-6.4%  Consistent with increasing risk for diabetes   (prediabetes)  >or=6.5%  Consistent with diabetes  Currently no consensus exists for use of hemoglobin A1C  for diagnosis of diabetes for children.     05/09/2016 8.0 (H) 4.5 - 6.2 % Final     REVIEW OF SYSTEMS:  General: Denies any fever or chills  Chest: Denies shortness of breath, wheezing, coughing, or sputum production  Heart: Denies chest pain, cold extremities, orthopenia, or reduced exercise tolerance    OBJECTIVE:   PHYSICAL EXAM: Apperance: Alert and orient in no distress,well developed, and with good attention to grooming and body habits  Lower Extremity Exam  VASCULAR: Dorsalis pedis pulses 0/4 bilateral and Posterior Tibial pulses 0/4 bilateral. Capillary fill time <4 seconds bilateral. No edema observed bilateral. Varicosities present bilateral. Skin " temperature of the lower extremities is warm to cool, proximal to distal. Hair growth absent bilateral. (--) lymphangitis or (--) cellulitis noted left.  DERMATOLOGICAL: (--) edema, (+) decreased periwound erythema, (--) malodor, (+) serous drainage, (--) warmth to left foot.  Ulcer noted to left dorsal 5th toe and plantar 5th submetatarsal with mixed eschar base and thin granular rim and with a 1 mm yellow/white border and hyperkeratosis surrounding ulcer. Left sub-fifth met head 0.5 x 0.3 x 0. 1cm down to dry fibrotic tissue base and left dorsal lateral fifth digit 0.5 x 1.0 x 0.3 cm. The ulcer does not extend into deeper tissue and (--) sinus tracts exist.  The dorsum surface of the feet are soft and supple.  The plantar aspects of both feet are dry and scaly. Nails 1-5 bilateral dystrophic, mycotic. Webspaces 1-4 clean, dry and without evidence of break in skin integrity bilateral.   NEUROLOGICAL: Light touch, sharp-dull, proprioception all present and equal bilaterally. Protective sensation absent at sites as tested with a Skandia-Jabari 5.07 monofilament.   MUSCULOSKELETAL: Muscle strength is 5/5 for foot inverters, everters, plantarflexors, and dorsiflexors. Muscle tone is normal.  Inspection/palpation of bone, joints and muscles unremarkable with the exception of that noted above.              ASSESSMENT:  1. Ischemic/Neurotrophic ulceration of left  foot   - without signs of infection  2. Diabetes mellitus with PVD  3. Mycotic toenails, bilateral    TREATMENT/PLAN: Treatment today consisted of the followin. The patient was counseled regarding findings of my examination, my impressions, treatment options, results of diagnostic tests and usual treatment plan.   2. With patient's permission, the left foot ulcer was sharply excisionally debrided of viable and nonviable tissue down to good bleeding granular tissue base utilizing sterile #15 blade and tissue nipper and forceps. Wound was irrigated with  sterile saline and bleeding was controlled with direct pressure. Minimal blood loss. The patient tolerated this well. Wound was then dressed with Collagenase, gauze, kerlix, ACE. Patient was given instructions on daily dressing changes. Patient was also instructed on the importance of keeping the wound and dressings clean dry and intact and keeping pressure off the wound area until complete healing of the wound.The patient is alerted to watch for any signs of infection (redness, pus, pain, increased swelling or fever) and call if such occurs. Home wound care instructions are provided.  3. [facility]   4,. Patient instructed to continue IV antibiotics as per Dr. Hernández with infectious disease.   5. Shoe inspection. Diabetic Foot Education. Patient reminded of the importance of good nutrition and blood sugar control to help prevent podiatric complications of diabetes. Patient instructed on proper foot hygeine. We discussed wearing proper shoe gear, daily foot inspections, never walking without protective shoe gear, never putting sharp instruments to feet.    6. Patient  will continue to monitor the areas daily, inspect feet, wear protective shoe gear when ambulatory, moisturizer to maintain skin integrity. Patient reminded of the importance of good nutrition and blood sugar control to help prevent podiatric complications of diabetes.  7. Patient to return in 2 weeks or sooner if needed.

## 2017-01-30 ENCOUNTER — ANTI-COAG VISIT (OUTPATIENT)
Dept: CARDIOLOGY | Facility: CLINIC | Age: 77
End: 2017-01-30

## 2017-01-30 LAB — INR PPP: 2.6

## 2017-01-30 NOTE — PROGRESS NOTES
Verbal result taken from __Kika/Kami _______. PT/INR __31.7/2.6____ Date drawn___1/30_____ continue dose, repeat INR in 4 weeks.

## 2017-02-03 ENCOUNTER — HOSPITAL ENCOUNTER (OUTPATIENT)
Dept: RADIOLOGY | Facility: HOSPITAL | Age: 77
Discharge: HOME | End: 2017-02-03
Attending: PODIATRIST
Payer: COMMERCIAL

## 2017-02-03 ENCOUNTER — OFFICE VISIT (OUTPATIENT)
Dept: PODIATRY | Facility: CLINIC | Age: 77
End: 2017-02-03
Payer: COMMERCIAL

## 2017-02-03 VITALS
WEIGHT: 251.13 LBS | BODY MASS INDEX: 40.36 KG/M2 | SYSTOLIC BLOOD PRESSURE: 127 MMHG | DIASTOLIC BLOOD PRESSURE: 71 MMHG | HEART RATE: 82 BPM | HEIGHT: 66 IN

## 2017-02-03 DIAGNOSIS — E11.51 TYPE II DIABETES MELLITUS WITH PERIPHERAL CIRCULATORY DISORDER: ICD-10-CM

## 2017-02-03 DIAGNOSIS — Z01.818 PRE-OP EXAM: ICD-10-CM

## 2017-02-03 DIAGNOSIS — I73.9 PVD (PERIPHERAL VASCULAR DISEASE): ICD-10-CM

## 2017-02-03 DIAGNOSIS — L97.529: ICD-10-CM

## 2017-02-03 DIAGNOSIS — M86.9 OSTEOMYELITIS OF ANKLE OR FOOT: ICD-10-CM

## 2017-02-03 DIAGNOSIS — M86.9 OSTEOMYELITIS OF ANKLE OR FOOT: Primary | ICD-10-CM

## 2017-02-03 PROCEDURE — 1157F ADVNC CARE PLAN IN RCRD: CPT | Mod: S$GLB,,, | Performed by: PODIATRIST

## 2017-02-03 PROCEDURE — 99213 OFFICE O/P EST LOW 20 MIN: CPT | Mod: S$GLB,,, | Performed by: PODIATRIST

## 2017-02-03 PROCEDURE — 73630 X-RAY EXAM OF FOOT: CPT | Mod: TC,PO,LT

## 2017-02-03 PROCEDURE — 3074F SYST BP LT 130 MM HG: CPT | Mod: S$GLB,,, | Performed by: PODIATRIST

## 2017-02-03 PROCEDURE — 1126F AMNT PAIN NOTED NONE PRSNT: CPT | Mod: S$GLB,,, | Performed by: PODIATRIST

## 2017-02-03 PROCEDURE — 99999 PR PBB SHADOW E&M-EST. PATIENT-LVL III: CPT | Mod: PBBFAC,,, | Performed by: PODIATRIST

## 2017-02-03 PROCEDURE — 4010F ACE/ARB THERAPY RXD/TAKEN: CPT | Mod: S$GLB,,, | Performed by: PODIATRIST

## 2017-02-03 PROCEDURE — 3078F DIAST BP <80 MM HG: CPT | Mod: S$GLB,,, | Performed by: PODIATRIST

## 2017-02-03 PROCEDURE — 1160F RVW MEDS BY RX/DR IN RCRD: CPT | Mod: S$GLB,,, | Performed by: PODIATRIST

## 2017-02-03 PROCEDURE — 3044F HG A1C LEVEL LT 7.0%: CPT | Mod: S$GLB,,, | Performed by: PODIATRIST

## 2017-02-03 PROCEDURE — 1159F MED LIST DOCD IN RCRD: CPT | Mod: S$GLB,,, | Performed by: PODIATRIST

## 2017-02-03 PROCEDURE — 71010 XR CHEST 1 VIEW PRE-OP: CPT | Mod: 26,,, | Performed by: RADIOLOGY

## 2017-02-03 PROCEDURE — 71010 XR CHEST 1 VIEW PRE-OP: CPT | Mod: TC,PO

## 2017-02-03 PROCEDURE — 73630 X-RAY EXAM OF FOOT: CPT | Mod: 26,LT,, | Performed by: RADIOLOGY

## 2017-02-03 NOTE — MR AVS SNAPSHOT
[facility]  [address] [place] [address]  Phone: [phone]  Fax: [phone]                  Raymond Stuart   2/3/2017 3:40 PM   Office Visit    Description:  Male : 1940   Provider:  Nic Castillo DPM   Department:  [facility] - Podiat[facility]           Reason for Visit     Foot Ulcer           Diagnoses this Visit        Comments    Type II diabetes mellitus with peripheral circulatory disorder    -  Primary     Osteomyelitis of ankle or foot         PVD (peripheral vascular disease)         Pre-op exam                To Do List           Future Appointments        Provider Department Dept Phone    2/3/2017 4:45 PM [facility] XR2 [facility] [phone]    2/3/2017 5:00 PM [facility] XR2 [facility] [phone]    2/3/2017 5:20 PM LABORATORY, [facility] [phone]    2017 9:00 AM DELIA Cristobal Jr., MD [facility] Internal Medicine [phone]    2/15/2017 1:00 PM PODIATRY NURSE, [facility] [phone]      Goals (5 Years of Data)     None      [facility]     [facility] Care Line -  Assistance  Registered nurses in the [facility] provide clinical advisement, health education, appointment booking, and other advisory services.  Call for this free service at [phone].             Medications           Message regarding Medications     Verify the changes and/or additions to your medication regime listed below are the same as discussed with your clinician today.  If any of these changes or additions are incorrect, please notify your healthcare provider.        STOP taking these medications     hydrocodone-acetaminophen 7.5-325mg (NORCO) 7.5-325 mg per tablet            Verify that the below list of medications is an accurate representation of the medications you are currently taking.  If none reported, the list may be blank. If incorrect, please contact your healthcare provider. Carry this  "list with you in case of emergency.           Current Medications     atenolol (TENORMIN) 100 MG tablet Take 1 tablet by mouth  daily    clopidogrel (PLAVIX) 75 mg tablet Take 1 tablet by mouth  daily    diphenhydrAMINE (BENADRYL) 50 mg/mL injection     fenofibrate micronized (LOFIBRA) 134 MG Cap Take 1 capsule (134 mg total) by mouth once daily.    gabapentin (NEURONTIN) 600 MG tablet Take 2 tablets (1,200 mg total) by mouth 2 (two) times daily.    glipizide-metformin (METAGLIP) 2.5-500 mg per tablet Take 2 tablets by mouth 2 (two) times daily before meals. 2 po qam, 1 po qpm    INVANZ 1 gram injection     lisinopril-hydrochlorothiazide (PRINZIDE,ZESTORETIC) 20-12.5 mg per tablet Take 2 tablets by mouth once daily.    niacin 1,000 mg TbSR Take 1 tablet by mouth Daily.    rosuvastatin (CRESTOR) 40 MG Tab Take 1 tablet (40 mg total) by mouth once daily.    SANTYL ointment     VANCOMYCIN HCL IN DEXTROSE 5 % (VANCOMYCIN IN DEXTROSE 5 %) 1 gram/200 mL PgBk     warfarin (COUMADIN) 5 MG tablet TAKE 1 AND 1/2 TABLETS BY  MOUTH ON WEDNESDAY AND  SUNDAY AND 1 TABLET ALL  REMAINING DAY AS DIRECTED  BY THE COUMADIN CLINIC.           Clinical Reference Information           Your Vitals Were     BP Pulse Height Weight BMI    127/71 (BP Location: Right arm, Patient Position: Sitting, BP Method: Automatic) 82 5' 6" (1.676 m) 113.9 kg (251 lb 1.7 oz) 40.53 kg/m2      Blood Pressure          Most Recent Value    BP  127/71      Allergies as of 2/3/2017     Sulfa (Sulfonamide Antibiotics)    Sulfamethoxazole    Trimethoprim      Immunizations Administered on Date of Encounter - 2/3/2017     None      Orders Placed During Today's Visit     Future Labs/Procedures Expected by Expires    C-reactive protein  2/3/2017 4/4/2018    CBC auto differential  2/3/2017 4/4/2018    Sedimentation rate, manual  2/3/2017 4/4/2018    X-Ray Foot Complete Left  2/3/2017 2/3/2018    Basic metabolic panel  2/16/2017 4/4/2018    X-Ray Chest 1 View Pre-OP  " 2/16/2017 2/3/2018      [facility] Sign-Up     Activating your [facility] account is as easy as 1-2-3!     1) Visit [email or web address], select Sign Up Now, enter this activation code and your date of birth, then select Next.  [email or web address]  Expires: 2/19/2017  4:36 PM      2) Create a username and password to use when you visit [facility] in the future and select a security question in case you lose your password and select Next.    3) Enter your e-mail address and click Sign Up!    Additional Information  If you have questions, please e-mail [email or web address] or call [phone] to talk to our [facility] staff. Remember, [facility] is NOT to be used for urgent needs. For medical emergencies, dial 911.         Language Assistance Services     ATTENTION: Language assistance services are available, free of charge. Please call [phone].      ATENCIÓN: Si habla lynn, tiene a villagran disposición servicios gratuitos de asistencia lingüística. Llame al [phone].     CHÚ Ý: N?u b?n nói Ti?ng Vi?t, có các d?ch v? h? tr? ngôn ng? mi?n phí dành cho b?n. G?i s? [phone].         [facility] complies with applicable Federal civil rights laws and does not discriminate on the basis of race, color, national origin, age, disability, or sex.

## 2017-02-04 NOTE — PROGRESS NOTES
Subjective:     Patient ID: Raymond Stuart is a 76 y.o. male.    Chief Complaint: Foot Ulcer (diabetic pt. ulcer of the left foot, pt. is accompanied by his daughter, pt. denied current pain with his left foot )    Raymond is a 76 y.o. male who presents to the clinic for evaluation and treatment of high risk feet. Raymond has a past medical history of Diabetes mellitus, type 2; Hyperlipidemia; Hypertension; Irregular heartbeat; PVD (peripheral vascular disease); S/P femoral-popliteal bypass surgery (8/8/2014); and S/P peripheral artery angioplasty (8/8/2014). The patient's chief complaint is diabetic foot ulcer right foot. Patient is accompaniedby his daughter who states that Dr. Judge said the has 3 times more blood flow now. She also states that Dr. Hernández is switching him from IV antibiotics to oral antibiotics on Monday. Patient denies any pain or drainage from the toe ulcers. Patient states his blood sugar this morning was 137mg/dl.     PCP: JOHNATHON Cristobal Jr, MD    Date Last Seen by PCP: 11/16/16    Current shoe gear:  Affected Foot: Surgical boot     Unaffected Foot: Extra depth shoes    History of Trauma: negative  Sign of Infection: none    Hemoglobin A1C   Date Value Ref Range Status   11/09/2016 6.9 (H) 4.5 - 6.2 % Final     Comment:     According to ADA guidelines, hemoglobin A1C <7.0% represents  optimal control in non-pregnant diabetic patients.  Different  metrics may apply to specific populations.   Standards of Medical Care in Diabetes - 2016.  For the purpose of screening for the presence of diabetes:  <5.7%     Consistent with the absence of diabetes  5.7-6.4%  Consistent with increasing risk for diabetes   (prediabetes)  >or=6.5%  Consistent with diabetes  Currently no consensus exists for use of hemoglobin A1C  for diagnosis of diabetes for children.     08/10/2016 8.1 (H) 4.5 - 6.2 % Final     Comment:     According to ADA guidelines, hemoglobin A1C <7.0% represents  optimal control in non-pregnant  diabetic patients.  Different  metrics may apply to specific populations.   Standards of Medical Care in Diabetes - 2016.  For the purpose of screening for the presence of diabetes:  <5.7%     Consistent with the absence of diabetes  5.7-6.4%  Consistent with increasing risk for diabetes   (prediabetes)  >or=6.5%  Consistent with diabetes  Currently no consensus exists for use of hemoglobin A1C  for diagnosis of diabetes for children.     05/09/2016 8.0 (H) 4.5 - 6.2 % Final       Patient Active Problem List   Diagnosis    Hyperlipidemia associated with type 2 diabetes mellitus    Essential hypertension    A-fib    PVD (peripheral vascular disease)    Special screening for malignant neoplasms, colon    S/P femoral-popliteal bypass surgery    S/P peripheral artery angioplasty    Type 2 diabetes mellitus with microalbuminuria    Type 2 diabetes mellitus with diabetic neuropathy       Medication List with Changes/Refills   Current Medications    ATENOLOL (TENORMIN) 100 MG TABLET    Take 1 tablet by mouth  daily    CLOPIDOGREL (PLAVIX) 75 MG TABLET    Take 1 tablet by mouth  daily    DIPHENHYDRAMINE (BENADRYL) 50 MG/ML INJECTION        FENOFIBRATE MICRONIZED (LOFIBRA) 134 MG CAP    Take 1 capsule (134 mg total) by mouth once daily.    GABAPENTIN (NEURONTIN) 600 MG TABLET    Take 2 tablets (1,200 mg total) by mouth 2 (two) times daily.    GLIPIZIDE-METFORMIN (METAGLIP) 2.5-500 MG PER TABLET    Take 2 tablets by mouth 2 (two) times daily before meals. 2 po qam, 1 po qpm    INVANZ 1 GRAM INJECTION        LISINOPRIL-HYDROCHLOROTHIAZIDE (PRINZIDE,ZESTORETIC) 20-12.5 MG PER TABLET    Take 2 tablets by mouth once daily.    NIACIN 1,000 MG TBSR    Take 1 tablet by mouth Daily.    ROSUVASTATIN (CRESTOR) 40 MG TAB    Take 1 tablet (40 mg total) by mouth once daily.    SANTYL OINTMENT        VANCOMYCIN HCL IN DEXTROSE 5 % (VANCOMYCIN IN DEXTROSE 5 %) 1 GRAM/200 ML PGBK        WARFARIN (COUMADIN) 5 MG TABLET    TAKE 1 AND  "1/2 TABLETS BY  MOUTH ON WEDNESDAY AND  SUNDAY AND 1 TABLET ALL  REMAINING DAY AS DIRECTED  BY THE COUMADIN CLINIC.   Discontinued Medications    HYDROCODONE-ACETAMINOPHEN 7.5-325MG (NORCO) 7.5-325 MG PER TABLET           Review of patient's allergies indicates:   Allergen Reactions    Sulfa (sulfonamide antibiotics)      Other reaction(s): Stomach upset    Sulfamethoxazole Rash    Trimethoprim Rash       Past Surgical History   Procedure Laterality Date    Rectoperitoneal fistula closure      Tonsillectomy      Toe surgery       Joint release    Popliteal artery stent  2013    Cardiac electrophysiology mapping and ablation         No family history on file.    Social History     Social History    Marital status:      Spouse name: N/A    Number of children: N/A    Years of education: N/A     Occupational History    Retired [employer]     Social History Main Topics    Smoking status: Former Smoker     Packs/day: 4.00     Years: 30.00     Types: Cigarettes     Quit date: 1/9/1994    Smokeless tobacco: Never Used    Alcohol use No    Drug use: No    Sexual activity: Not on file     Other Topics Concern    Not on file     Social History Narrative       Vitals:    02/03/17 1524   BP: 127/71   Pulse: 82   Weight: 113.9 kg (251 lb 1.7 oz)   Height: 5' 6" (1.676 m)   PainSc: 0-No pain       Hemoglobin A1C   Date Value Ref Range Status   11/09/2016 6.9 (H) 4.5 - 6.2 % Final     Comment:     According to ADA guidelines, hemoglobin A1C <7.0% represents  optimal control in non-pregnant diabetic patients.  Different  metrics may apply to specific populations.   Standards of Medical Care in Diabetes - 2016.  For the purpose of screening for the presence of diabetes:  <5.7%     Consistent with the absence of diabetes  5.7-6.4%  Consistent with increasing risk for diabetes   (prediabetes)  >or=6.5%  Consistent with diabetes  Currently no consensus exists for use of hemoglobin A1C  for diagnosis " of diabetes for children.     08/10/2016 8.1 (H) 4.5 - 6.2 % Final     Comment:     According to ADA guidelines, hemoglobin A1C <7.0% represents  optimal control in non-pregnant diabetic patients.  Different  metrics may apply to specific populations.   Standards of Medical Care in Diabetes - 2016.  For the purpose of screening for the presence of diabetes:  <5.7%     Consistent with the absence of diabetes  5.7-6.4%  Consistent with increasing risk for diabetes   (prediabetes)  >or=6.5%  Consistent with diabetes  Currently no consensus exists for use of hemoglobin A1C  for diagnosis of diabetes for children.     05/09/2016 8.0 (H) 4.5 - 6.2 % Final       Review of Systems   Constitutional: Negative for chills and fever.   Respiratory: Negative for shortness of breath.    Cardiovascular: Positive for claudication. Negative for chest pain, palpitations, orthopnea and leg swelling.   Gastrointestinal: Negative for diarrhea, nausea and vomiting.   Musculoskeletal: Negative for joint pain.   Skin: Negative for rash.   Neurological: Positive for tingling and sensory change. Negative for dizziness, focal weakness and weakness.         Objective:      PHYSICAL EXAM: Apperance: Alert and orient in no distress,well developed, and with good attention to grooming and body habits  Lower Extremity Exam  VASCULAR: Dorsalis pedis pulses 0/4 bilateral and Posterior Tibial pulses 0/4 bilateral. Capillary fill time <4 seconds bilateral. No edema observed bilateral. Varicosities present bilateral. Skin temperature of the lower extremities is warm to cool, proximal to distal. Hair growth absent bilateral. (--) lymphangitis or (--) cellulitis noted left.  DERMATOLOGICAL: (--) edema, (+) decreased periwound erythema, (--) malodor, (+) serous drainage, (--) warmth to left foot.  Ulcer noted to left dorsal 5th toe and plantar 5th submetatarsal with mixed eschar base and thin granular rim and with a 1 mm yellow/white border and  hyperkeratosis surrounding ulcer. Left sub-fifth met head 0.3 x 0.2 x 0. 1cm down to dry fibrotic tissue base and left dorsal lateral fifth digit 0.5 x 1.0 x 0.3 cm. The ulcer does not extend into deeper tissue and (--) sinus tracts exist.  The dorsum surface of the feet are soft and supple.  The plantar aspects of both feet are dry and scaly. Nails 1-5 bilateral dystrophic, mycotic. Webspaces 1-4 clean, dry and without evidence of break in skin integrity bilateral.   NEUROLOGICAL: Light touch, sharp-dull, proprioception all present and equal bilaterally. Protective sensation absent at sites as tested with a [place] 5.07 monofilament.   MUSCULOSKELETAL: Muscle strength is 5/5 for foot inverters, everters, plantarflexors, and dorsiflexors. Muscle tone is normal.  Inspection/palpation of bone, joints and muscles unremarkable with the exception of that noted above.        Assessment:       Encounter Diagnoses   Name Primary?    Type II diabetes mellitus with peripheral circulatory disorder Yes    Osteomyelitis of ankle or foot     PVD (peripheral vascular disease)     Pre-op exam          Plan:   Type II diabetes mellitus with peripheral circulatory disorder  -     CBC auto differential; Future; Expected date: 2/3/17  -     Sedimentation rate, manual; Future; Expected date: 2/3/17  -     C-reactive protein; Future; Expected date: 2/3/17  -     Basic metabolic panel; Future; Expected date: 2/16/17  -     X-Ray Foot Complete Left; Future; Expected date: 2/3/17  -     X-Ray Chest 1 View Pre-OP; Future; Expected date: 2/16/17    Osteomyelitis of ankle or foot  -     CBC auto differential; Future; Expected date: 2/3/17  -     Sedimentation rate, manual; Future; Expected date: 2/3/17  -     C-reactive protein; Future; Expected date: 2/3/17  -     Basic metabolic panel; Future; Expected date: 2/16/17  -     X-Ray Foot Complete Left; Future; Expected date: 2/3/17  -     X-Ray Chest 1 View Pre-OP; Future; Expected  date: 2/16/17    PVD (peripheral vascular disease)  -     CBC auto differential; Future; Expected date: 2/3/17  -     Sedimentation rate, manual; Future; Expected date: 2/3/17  -     C-reactive protein; Future; Expected date: 2/3/17  -     Basic metabolic panel; Future; Expected date: 2/16/17  -     X-Ray Foot Complete Left; Future; Expected date: 2/3/17  -     X-Ray Chest 1 View Pre-OP; Future; Expected date: 2/16/17    Pre-op exam  -     X-Ray Foot Complete Left; Future; Expected date: 2/3/17  -     X-Ray Chest 1 View Pre-OP; Future; Expected date: 2/16/17      I counseled the patient on his conditions, their implications and medical management.  Discussed with patient in detail the treatment options for infected ulcer with bone infection, including (1) local wound care with IV antibiotic for 6-8 weeks, or (2) surgical excision(amputation) of infected bone. Risk for treatments including further limb loss, delayed healing, non-healing was also diascussed. Patient states he undertands both treatment options and would like to proceed with toe amputation.   Discussed surgical and conservative management of toe infection. Surgically we briefly discussed pre and post operative expectations.   Pre-op labs and x-rays ordered.  Patient scheduled for pr-op examination with Dr. Cristobal.   Will discuss with Dr. Judge(vascular), Dr. Hernández(infectious disease), and Dr. Flores(Cardiology).   Patient to continue local wound care and antibiotics.   Patient return for consent signing. Patient tentatively scheduled for toe amputation 2/17/17.                 Nic Castillo DPM  [facility]

## 2017-02-06 ENCOUNTER — TELEPHONE (OUTPATIENT)
Dept: CARDIOLOGY | Facility: CLINIC | Age: 77
End: 2017-02-06

## 2017-02-06 DIAGNOSIS — I73.9 PVD (PERIPHERAL VASCULAR DISEASE): ICD-10-CM

## 2017-02-06 DIAGNOSIS — M86.9 OSTEOMYELITIS OF ANKLE OR FOOT: Primary | ICD-10-CM

## 2017-02-06 DIAGNOSIS — L97.529: ICD-10-CM

## 2017-02-06 NOTE — TELEPHONE ENCOUNTER
Spoke with pt daughter Yarely pt is scheduled with Dr. Cristobal on Monday 2/13/17 for preop exam pt will come see [facility] after appt with Dr. Cristobal for 10:00am. Pt daughter notified of day,time and location. Pt surgery is scheduled for 2/17/17.

## 2017-02-06 NOTE — TELEPHONE ENCOUNTER
----- Message from MEDINA Fernandes sent at 2/6/2017  8:20 AM CST -----  Needs pre-op appointment with me this week, please arrange    Thanks

## 2017-02-08 ENCOUNTER — TELEPHONE (OUTPATIENT)
Dept: PODIATRY | Facility: CLINIC | Age: 77
End: 2017-02-08

## 2017-02-08 NOTE — TELEPHONE ENCOUNTER
----- Message from Danielle Prescott sent at 2/8/2017  2:00 PM CST -----  Contact: pt daughter Yarely Pritchett have questions regarding patient surgery. States need to know when the patient need to stop taking his medication. Please adv/call [phone].//thanks. cw

## 2017-02-08 NOTE — TELEPHONE ENCOUNTER
Returned 's call regarding her wanting to know what medications her father () should stop taking before hi surgery. After speaking with  I informed  that  will inform them on Friday ( 02/10/2017) on medications he should stop taking after  speaks with .  stated understanding and the call ended well.

## 2017-02-09 ENCOUNTER — TELEPHONE (OUTPATIENT)
Dept: PODIATRY | Facility: CLINIC | Age: 77
End: 2017-02-09

## 2017-02-09 NOTE — TELEPHONE ENCOUNTER
----- Message from Danielle Prescott sent at 2/8/2017  2:00 PM CST -----  Contact: pt daughter Yarely Pritchett have questions regarding patient surgery. States need to know when the patient need to stop taking his medication. Please adv/call [phone].//thanks. cw

## 2017-02-09 NOTE — TELEPHONE ENCOUNTER
Contacted 's call regarding the medications her father () should stop taking before his surgery. I informed  that  should stop taking Plavix and Coumadin on Sunday as ordered by . I also requested the correct spelling and contact information for his infectious disease doctor.  stated the doctor's name is  and and the contact number for him is [phone]. We both stated understanding and the call ended well.

## 2017-02-13 ENCOUNTER — OFFICE VISIT (OUTPATIENT)
Dept: INTERNAL MEDICINE | Facility: CLINIC | Age: 77
End: 2017-02-13
Payer: COMMERCIAL

## 2017-02-13 ENCOUNTER — CLINICAL SUPPORT (OUTPATIENT)
Dept: CARDIOLOGY | Facility: CLINIC | Age: 77
End: 2017-02-13
Payer: COMMERCIAL

## 2017-02-13 ENCOUNTER — OFFICE VISIT (OUTPATIENT)
Dept: CARDIOLOGY | Facility: CLINIC | Age: 77
End: 2017-02-13
Payer: COMMERCIAL

## 2017-02-13 VITALS
HEART RATE: 98 BPM | SYSTOLIC BLOOD PRESSURE: 132 MMHG | BODY MASS INDEX: 39.03 KG/M2 | DIASTOLIC BLOOD PRESSURE: 80 MMHG | OXYGEN SATURATION: 97 % | TEMPERATURE: 97 F | WEIGHT: 241.88 LBS

## 2017-02-13 VITALS
HEIGHT: 66 IN | DIASTOLIC BLOOD PRESSURE: 74 MMHG | BODY MASS INDEX: 38.25 KG/M2 | WEIGHT: 238 LBS | HEART RATE: 78 BPM | SYSTOLIC BLOOD PRESSURE: 132 MMHG

## 2017-02-13 DIAGNOSIS — Z01.818 PRE-OP EVALUATION: ICD-10-CM

## 2017-02-13 DIAGNOSIS — Z95.828 S/P FEMORAL-POPLITEAL BYPASS SURGERY: ICD-10-CM

## 2017-02-13 DIAGNOSIS — L08.9 DIABETIC FOOT INFECTION: ICD-10-CM

## 2017-02-13 DIAGNOSIS — E11.69 HYPERLIPIDEMIA ASSOCIATED WITH TYPE 2 DIABETES MELLITUS: ICD-10-CM

## 2017-02-13 DIAGNOSIS — I73.9 PVD (PERIPHERAL VASCULAR DISEASE): ICD-10-CM

## 2017-02-13 DIAGNOSIS — Z01.818 PRE-OP EVALUATION: Primary | ICD-10-CM

## 2017-02-13 DIAGNOSIS — E78.5 HYPERLIPIDEMIA ASSOCIATED WITH TYPE 2 DIABETES MELLITUS: ICD-10-CM

## 2017-02-13 DIAGNOSIS — I48.20 CHRONIC ATRIAL FIBRILLATION: ICD-10-CM

## 2017-02-13 DIAGNOSIS — I10 ESSENTIAL HYPERTENSION: ICD-10-CM

## 2017-02-13 DIAGNOSIS — Z98.62 S/P PERIPHERAL ARTERY ANGIOPLASTY: ICD-10-CM

## 2017-02-13 DIAGNOSIS — E11.29 TYPE 2 DIABETES MELLITUS WITH MICROALBUMINURIA, WITHOUT LONG-TERM CURRENT USE OF INSULIN: ICD-10-CM

## 2017-02-13 DIAGNOSIS — E11.40 TYPE 2 DIABETES MELLITUS WITH DIABETIC NEUROPATHY, WITHOUT LONG-TERM CURRENT USE OF INSULIN: ICD-10-CM

## 2017-02-13 DIAGNOSIS — E11.628 DIABETIC FOOT INFECTION: ICD-10-CM

## 2017-02-13 DIAGNOSIS — Z01.818 PREOP EXAM FOR INTERNAL MEDICINE: Primary | ICD-10-CM

## 2017-02-13 DIAGNOSIS — R80.9 TYPE 2 DIABETES MELLITUS WITH MICROALBUMINURIA, WITHOUT LONG-TERM CURRENT USE OF INSULIN: ICD-10-CM

## 2017-02-13 PROCEDURE — 1159F MED LIST DOCD IN RCRD: CPT | Mod: S$GLB,,, | Performed by: PEDIATRICS

## 2017-02-13 PROCEDURE — 3044F HG A1C LEVEL LT 7.0%: CPT | Mod: S$GLB,,, | Performed by: PEDIATRICS

## 2017-02-13 PROCEDURE — 1157F ADVNC CARE PLAN IN RCRD: CPT | Mod: S$GLB,,, | Performed by: PEDIATRICS

## 2017-02-13 PROCEDURE — 99214 OFFICE O/P EST MOD 30 MIN: CPT | Mod: S$GLB,,, | Performed by: PEDIATRICS

## 2017-02-13 PROCEDURE — 3075F SYST BP GE 130 - 139MM HG: CPT | Mod: S$GLB,,, | Performed by: PHYSICIAN ASSISTANT

## 2017-02-13 PROCEDURE — 4010F ACE/ARB THERAPY RXD/TAKEN: CPT | Mod: S$GLB,,, | Performed by: PEDIATRICS

## 2017-02-13 PROCEDURE — 3079F DIAST BP 80-89 MM HG: CPT | Mod: S$GLB,,, | Performed by: PEDIATRICS

## 2017-02-13 PROCEDURE — 2022F DILAT RTA XM EVC RTNOPTHY: CPT | Mod: S$GLB,,, | Performed by: PHYSICIAN ASSISTANT

## 2017-02-13 PROCEDURE — 1160F RVW MEDS BY RX/DR IN RCRD: CPT | Mod: S$GLB,,, | Performed by: PHYSICIAN ASSISTANT

## 2017-02-13 PROCEDURE — 1157F ADVNC CARE PLAN IN RCRD: CPT | Mod: S$GLB,,, | Performed by: PHYSICIAN ASSISTANT

## 2017-02-13 PROCEDURE — 99999 PR PBB SHADOW E&M-EST. PATIENT-LVL III: CPT | Mod: PBBFAC,,, | Performed by: PHYSICIAN ASSISTANT

## 2017-02-13 PROCEDURE — 93000 ELECTROCARDIOGRAM COMPLETE: CPT | Mod: S$GLB,,, | Performed by: NUCLEAR MEDICINE

## 2017-02-13 PROCEDURE — 3075F SYST BP GE 130 - 139MM HG: CPT | Mod: S$GLB,,, | Performed by: PEDIATRICS

## 2017-02-13 PROCEDURE — 1160F RVW MEDS BY RX/DR IN RCRD: CPT | Mod: S$GLB,,, | Performed by: PEDIATRICS

## 2017-02-13 PROCEDURE — 99999 PR PBB SHADOW E&M-EST. PATIENT-LVL III: CPT | Mod: PBBFAC,,, | Performed by: PEDIATRICS

## 2017-02-13 PROCEDURE — 3078F DIAST BP <80 MM HG: CPT | Mod: S$GLB,,, | Performed by: PHYSICIAN ASSISTANT

## 2017-02-13 PROCEDURE — 99214 OFFICE O/P EST MOD 30 MIN: CPT | Mod: S$GLB,,, | Performed by: PHYSICIAN ASSISTANT

## 2017-02-13 PROCEDURE — 3044F HG A1C LEVEL LT 7.0%: CPT | Mod: S$GLB,,, | Performed by: PHYSICIAN ASSISTANT

## 2017-02-13 PROCEDURE — 1126F AMNT PAIN NOTED NONE PRSNT: CPT | Mod: S$GLB,,, | Performed by: PHYSICIAN ASSISTANT

## 2017-02-13 PROCEDURE — 1159F MED LIST DOCD IN RCRD: CPT | Mod: S$GLB,,, | Performed by: PHYSICIAN ASSISTANT

## 2017-02-13 PROCEDURE — 4010F ACE/ARB THERAPY RXD/TAKEN: CPT | Mod: S$GLB,,, | Performed by: PHYSICIAN ASSISTANT

## 2017-02-13 NOTE — PROGRESS NOTES
Subjective:        Patient ID: Raymond Stuart is a 76 y.o. male.      Chief Complaint: preop clearance for left 5th toe amputation 2/17/17 by Dr Castillo. He is already off coumadin and plavix, He is still in some of cardiac preop w/u.      HPI Comments: HTN: B/P good, no HTNive symptoms.    DM: no hyper/hypoglycemic symptoms. Am self monitoring BS- 110-130.  LIPIDS:somewhat following D&E, tolerating and compliant with med(s).    PVD/cardio: followed by Dr Judge and Dr Flores. Had revascularization 12/16 of left leg.    LABS REVIEWED AND DISCUSSED WITH PATIENT       Review of Systems   Constitutional: Negative for fever and unexpected weight change.   HENT: Negative for congestion and rhinorrhea.   Eyes: Negative for discharge and redness.   Respiratory: Negative for cough and wheezing.   Cardiovascular: Negative for chest pain, palpitations and leg swelling.   Gastrointestinal: Negative for constipation, diarrhea and vomiting.   Endocrine: Negative for cold intolerance, heat intolerance, polydipsia, polyphagia and polyuria.   Genitourinary: Negative for decreased urine volume and difficulty urinating.   Musculoskeletal: Negative for arthralgias and joint swelling.   Skin: Negative for rash and wound.   Neurological: Negative for syncope and headaches.   Psychiatric/Behavioral: Negative for behavioral problems and sleep disturbance.       Objective:        Physical Exam   Constitutional: He is oriented to person, place, and time. He appears well-developed and well-nourished. No distress.   Neck: Trachea normal and normal range of motion. Neck supple. No JVD present. No thyromegaly present.   Cardiovascular: Normal rate, regular rhythm, S1 normal, S2 normal, normal heart sounds and normal pulses. Exam reveals no gallop and no friction rub.    No murmur heard.  Pulmonary/Chest: Effort normal and breath sounds normal. He has no wheezes. He has no rales.   Abdominal: Soft. Normal appearance and bowel sounds are normal. He  exhibits no mass. There is no hepatosplenomegaly. There is no tenderness. There is no rebound and no guarding.   Musculoskeletal: He exhibits no edema.   Lymphadenopathy:   He has no cervical adenopathy.   Neurological: He is alert and oriented to person, place, and time. He has normal strength. No cranial nerve deficit. Coordination and gait normal.   Skin: Skin is warm and intact. No rash noted.   Psychiatric: He has a normal mood and affect. His speech is normal and behavior is normal. Judgment and thought content normal.        Assessment:      preop clearance for left 5th toe amputation in a   1.  Type 2 diabetes mellitus with diabetic neuropathy     2.  Type 2 diabetes mellitus with microalbuminuria     3.  Hyperlipidemia associated with type 2 diabetes mellitus     4.  Essential hypertension     5.  Chronic atrial fibrillation     6.  PVD (peripheral vascular disease)         Plan:    He has held plavix and coumadin. Also hold niacin. Hold glipizide/metformin AM of surgery, use preop insulin to control BS as needed. O/W cleared for surgery from IM point of view. Note to Dr Castillo.

## 2017-02-13 NOTE — MR AVS SNAPSHOT
[facility] Internal Medicine  [address]  [facility] [address]  Phone: [phone]  Fax: [phone]                  Raymond Stuart   2017 9:00 AM   Office Visit    Description:  Male : 1940   Provider:  DELIA Cristobal Jr., MD   Department:  [facility] - Internal Medicine           Reason for Visit     Pre-op Exam           Diagnoses this Visit        Comments    Preop exam for internal medicine    -  Primary     Diabetic foot infection         Type 2 diabetes mellitus with diabetic neuropathy, without long-term current use of insulin         Type 2 diabetes mellitus with microalbuminuria, without long-term current use of insulin         Hyperlipidemia associated with type 2 diabetes mellitus         Essential hypertension         Chronic atrial fibrillation         PVD (peripheral vascular disease)         S/P femoral-popliteal bypass surgery                To Do List           Future Appointments        Provider Department Dept Phone    2017 9[facility]50 [phone]    2017 10:00 AM MEDINA Fernandes [facility] Cardiology [phone]    2/15/2017 1:00 PM PODIATRY NURSE, [facility] Podiatry [phone]    2017 3:15 PM Adele Gomez MD [facility] Dermatology [phone]    2017 7:35 AM LABORATORY, [facility] [phone]      Your Future Surgeries/Procedures     2017   Surgery with Nic Castillo DPM   [facility])    [address] [facility] [address] [phone]              Goals (5 Years of Data)     None      Follow-Up and Disposition     Follow-up and Disposition History      [facility]     [facility] Care [facility] -  Assistance  Registered nurses in the [facility] provide clinical advisement, health education, appointment booking, and other advisory services.  Call for this free service at [phone].              Medications           Message regarding Medications     Verify the changes and/or additions to your medication regime listed below are the same as discussed with your clinician today.  If any of these changes or additions are incorrect, please notify your healthcare provider.             Verify that the below list of medications is an accurate representation of the medications you are currently taking.  If none reported, the list may be blank. If incorrect, please contact your healthcare provider. Carry this list with you in case of emergency.           Current Medications     atenolol (TENORMIN) 100 MG tablet Take 1 tablet by mouth  daily    clopidogrel (PLAVIX) 75 mg tablet Take 1 tablet by mouth  daily    diphenhydrAMINE (BENADRYL) 50 mg/mL injection     fenofibrate micronized (LOFIBRA) 134 MG Cap Take 1 capsule (134 mg total) by mouth once daily.    gabapentin (NEURONTIN) 600 MG tablet Take 2 tablets (1,200 mg total) by mouth 2 (two) times daily.    glipizide-metformin (METAGLIP) 2.5-500 mg per tablet Take 2 tablets by mouth 2 (two) times daily before meals. 2 po qam, 1 po qpm    INVANZ 1 gram injection     lisinopril-hydrochlorothiazide (PRINZIDE,ZESTORETIC) 20-12.5 mg per tablet Take 2 tablets by mouth once daily.    niacin 1,000 mg TbSR Take 1 tablet by mouth Daily.    rosuvastatin (CRESTOR) 40 MG Tab Take 1 tablet (40 mg total) by mouth once daily.    SANTYL ointment     VANCOMYCIN HCL IN DEXTROSE 5 % (VANCOMYCIN IN DEXTROSE 5 %) 1 gram/200 mL PgBk     warfarin (COUMADIN) 5 MG tablet TAKE 1 AND 1/2 TABLETS BY  MOUTH ON WEDNESDAY AND  SUNDAY AND 1 TABLET ALL  REMAINING DAY AS DIRECTED  BY THE COUMADIN CLINIC.           Clinical Reference Information           Your Vitals Were     BP Pulse Temp Weight SpO2 BMI    132/80 98 97.2 °F (36.2 °C) (Tympanic) 109.7 kg (241 lb 13.5 oz) 97% 39.03 kg/m2      Blood Pressure          Most Recent Value    BP  132/80      Allergies as of 2/13/2017     Sulfa (Sulfonamide  Antibiotics)    Sulfamethoxazole    Trimethoprim      Immunizations Administered on Date of Encounter - 2/13/2017     None      [facility] Sign-Up     Activating your [facility] account is as easy as 1-2-3!     1) Visit [email or web address], select Sign Up Now, enter this activation code and your date of birth, then select Next.  [email or web address]  Expires: 2/19/2017  4:36 PM      2) Create a username and password to use when you visit [facility] in the future and select a security question in case you lose your password and select Next.    3) Enter your e-mail address and click Sign Up!    Additional Information  If you have questions, please e-mail [email or web address] or call [phone] to talk to our [facility] staff. Remember, [facility] is NOT to be used for urgent needs. For medical emergencies, dial 911.         Language Assistance Services     ATTENTION: Language assistance services are available, free of charge. Please call [phone].      ATENCIÓN: Si habla español, tiene a villagran disposición servicios gratuitos de asistencia lingüística. Llame al [phone].     JANIE Ý: N?u b?n nói Ti?ng Vi?t, có các d?ch v? h? tr? ngôn ng? mi?n phí dành cho b?n. G?i s? [phone].         [facility] complies with applicable Federal civil rights laws and does not discriminate on the basis of race, color, national origin, age, disability, or sex.

## 2017-02-13 NOTE — MR AVS SNAPSHOT
[facility] Cardiology  [address]  [facility] [address]  Phone: [phone]  Fax: [phone]                  Raymond Stuart   2017 10:00 AM   Office Visit    Description:  Male : 1940   Provider:  MEDINA Fernandes   Department:  [facility] - Cardiology           Reason for Visit     Pre-op Exam           Diagnoses this Visit        Comments    Pre-op evaluation    -  Primary     Type 2 diabetes mellitus with diabetic neuropathy, without long-term current use of insulin         PVD (peripheral vascular disease)         Essential hypertension         Chronic atrial fibrillation         Hyperlipidemia associated with type 2 diabetes mellitus         S/P femoral-popliteal bypass surgery         S/P peripheral artery angioplasty                To Do List           Future Appointments        Provider Department Dept Phone    2017 10:00 AM MEDINA Fernandes [facility] Cardiology [phone]    2/15/2017 1:00 PM PODIATRY NURSE, [facility] Podiatry [phone]    2017 3:15 PM Adele Gomez MD [facility] Dermatology [phone]    2017 7:35 AM LABORATORY, [facility] [phone]    2017 7:40 AM SPECIMEN, [facility] [phone]      Your Future Surgeries/Procedures     2017   Surgery with Nic Castillo DPM   [facility])    [address] [facility] [address] [phone]              Goals (5 Years of Data)     None      [facility]     [facility] Care [facility] -  Assistance  Registered nurses in the [facility] provide clinical advisement, health education, appointment booking, and other advisory services.  Call for this free service at [phone].             Medications           Message regarding Medications     Verify the changes and/or additions to your medication regime listed below are the same as discussed with your  "clinician today.  If any of these changes or additions are incorrect, please notify your healthcare provider.             Verify that the below list of medications is an accurate representation of the medications you are currently taking.  If none reported, the list may be blank. If incorrect, please contact your healthcare provider. Carry this list with you in case of emergency.           Current Medications     atenolol (TENORMIN) 100 MG tablet Take 1 tablet by mouth  daily    clopidogrel (PLAVIX) 75 mg tablet Take 1 tablet by mouth  daily    diphenhydrAMINE (BENADRYL) 50 mg/mL injection     fenofibrate micronized (LOFIBRA) 134 MG Cap Take 1 capsule (134 mg total) by mouth once daily.    gabapentin (NEURONTIN) 600 MG tablet Take 2 tablets (1,200 mg total) by mouth 2 (two) times daily.    glipizide-metformin (METAGLIP) 2.5-500 mg per tablet Take 2 tablets by mouth 2 (two) times daily before meals. 2 po qam, 1 po qpm    INVANZ 1 gram injection     lisinopril-hydrochlorothiazide (PRINZIDE,ZESTORETIC) 20-12.5 mg per tablet Take 2 tablets by mouth once daily.    niacin 1,000 mg TbSR Take 1 tablet by mouth Daily.    rosuvastatin (CRESTOR) 40 MG Tab Take 1 tablet (40 mg total) by mouth once daily.    SANTYL ointment     VANCOMYCIN HCL IN DEXTROSE 5 % (VANCOMYCIN IN DEXTROSE 5 %) 1 gram/200 mL PgBk     warfarin (COUMADIN) 5 MG tablet TAKE 1 AND 1/2 TABLETS BY  MOUTH ON WEDNESDAY AND  SUNDAY AND 1 TABLET ALL  REMAINING DAY AS DIRECTED  BY THE COUMADIN CLINIC.           Clinical Reference Information           Your Vitals Were     BP Pulse Height Weight BMI    132/74 78 5' 6" (1.676 m) 108 kg (238 lb) 38.41 kg/m2      Blood Pressure          Most Recent Value    BP  132/74      Allergies as of 2/13/2017     Sulfa (Sulfonamide Antibiotics)    Sulfamethoxazole    Trimethoprim      Immunizations Administered on Date of Encounter - 2/13/2017     None      [facility] Sign-Up     Activating your [facility] account is as easy as " 1-2-3!     1) Visit [email or web address], select Sign Up Now, enter this activation code and your date of birth, then select Next.  [email or web address]  Expires: 2/19/2017  4:36 PM      2) Create a username and password to use when you visit [facility] in the future and select a security question in case you lose your password and select Next.    3) Enter your e-mail address and click Sign Up!    Additional Information  If you have questions, please e-mail [email or web address] or call [phone] to talk to our [email or web address] staff. Remember, [facility] is NOT to be used for urgent needs. For medical emergencies, dial 911.         Language Assistance Services     ATTENTION: Language assistance services are available, free of charge. Please call [phone].      ATENCIÓN: Si habla español, tiene a villagran disposición servicios gratuitos de asistencia lingüística. Llame al [phone].     CHÚ Ý: N?u b?n nói Ti?ng Vi?t, có các d?ch v? h? tr? ngôn ng? mi?n phí dành cho b?n. G?i s? [phone].         [facility] complies with applicable Federal civil rights laws and does not discriminate on the basis of race, color, national origin, age, disability, or sex.

## 2017-02-13 NOTE — PROGRESS NOTES
Subjective:    Patient ID:  Raymond Stuart is a 76 y.o. male who presents for follow-up of Pre-op Exam      HPI   Mr. Stuart is a 76 year old male patient with a PMHx of atrial fibrillation (on Coumadin), PVD s/p fem pop-bypass, hyperlipidemia, and HTN who presents today for pre-op clearance. Patient is scheduled to undergo left toe amputation by Dr. Castillo on 2/17/17. He returns today and states he is feeling well. No cardiac complaints. Denies chest pain, SOB, or anginal equivalent. No lightheadedness, dizziness, palpitations, near syncope, or syncope. No recent falls. HTN stable and well-controlled. Patient reports compliance with his medications. Remains on Coumadin for CVA prophylaxis. No bleeding issues. No signs/symptoms of TIA/CVA. EKG today shows afib with controlled rate, no acute changes. Patient with recent MPI stress test in 12/16 that was negative.       Review of Systems   Constitution: Negative for chills, decreased appetite, fever, weakness and malaise/fatigue.   HENT: Negative for congestion, headaches, hoarse voice and sore throat.    Eyes: Negative for blurred vision and discharge.   Cardiovascular: Negative for chest pain, claudication, cyanosis, dyspnea on exertion, irregular heartbeat, leg swelling, near-syncope, orthopnea, palpitations and paroxysmal nocturnal dyspnea.   Respiratory: Negative for cough, hemoptysis, shortness of breath, snoring, sputum production and wheezing.    Endocrine: Negative for cold intolerance and heat intolerance.   Hematologic/Lymphatic: Negative for bleeding problem. Bruises/bleeds easily.   Skin: Negative for rash.   Musculoskeletal: Positive for joint pain. Negative for arthritis, back pain, joint swelling, muscle cramps, muscle weakness and myalgias.   Gastrointestinal: Negative for abdominal pain, constipation, diarrhea, heartburn, melena and nausea.   Genitourinary: Negative for hematuria.   Neurological: Negative for dizziness, focal weakness, light-headedness,  "loss of balance, numbness, paresthesias and seizures.   Psychiatric/Behavioral: Negative for memory loss. The patient does not have insomnia.    Allergic/Immunologic: Negative for hives.         Visit Vitals    /74    Pulse 78    Ht 5' 6" (1.676 m)    Wt 108 kg (238 lb)    BMI 38.41 kg/m2       Objective:    Physical Exam   Constitutional: He is oriented to person, place, and time. He appears well-developed and well-nourished. No distress.   HENT:   Head: Normocephalic and atraumatic.   Eyes: Pupils are equal, round, and reactive to light. Right eye exhibits no discharge. Left eye exhibits no discharge.   Neck: Neck supple. No JVD present. No tracheal deviation present. No thyromegaly present.   Cardiovascular: Normal rate, S1 normal and S2 normal.  An irregularly irregular rhythm present. PMI is not displaced.  Exam reveals no gallop, no S3, no S4 and no friction rub.    Murmur heard.   Harsh midsystolic murmur is present  at the upper right sternal border radiating to the neck  Pulses:       Radial pulses are 2+ on the right side, and 2+ on the left side.   Pulmonary/Chest: Effort normal and breath sounds normal. No respiratory distress. He has no wheezes. He has no rales.   Abdominal: Soft. He exhibits no distension. There is no tenderness. There is no rebound.   Musculoskeletal: He exhibits no edema.   Neurological: He is alert and oriented to person, place, and time.   Skin: Skin is warm and dry. He is not diaphoretic. No erythema.   Scattered bruises bilateral UE     Psychiatric: He has a normal mood and affect. His behavior is normal. Thought content normal.   Nursing note and vitals reviewed.      Impression: NORMAL MYOCARDIAL PERFUSION  1. The perfusion scan is free of evidence for myocardial ischemia or injury.   2. There is a mild intensity fixed defect in the inferior wall of the left ventricle, secondary to diaphragm attenuation.   3. Resting wall motion is physiologic.   4. Resting LV " function is normal.   5. The ventricular volumes are normal at rest and stress.   6. The extracardiac distribution of radioactivity is normal.     Assessment:       1. Pre-op evaluation    2. Type 2 diabetes mellitus with diabetic neuropathy, without long-term current use of insulin    3. PVD (peripheral vascular disease)    4. Essential hypertension    5. Chronic atrial fibrillation    6. Hyperlipidemia associated with type 2 diabetes mellitus    7. S/P femoral-popliteal bypass surgery    8. S/P peripheral artery angioplasty       Doing clinically well. No cardiac complaints. No angina or anginal equivalent. Ok to proceed with surgery at moderate risk of karri and post OP CV complications. Plavix and Coumadin on hold, resume ASAP post-procedure.   Plan:     -Continue current medical management and risk factor modification  -Cardiac, low salt diet  -Ok to proceed with surgery at moderate risk of karri and post op CV complications  -Resume Coumadin and Plavix ASA post-procedure  -Keep scheduled f/u        Chart reviewed. Dr. Flores agrees with plan as outlined above.

## 2017-02-15 ENCOUNTER — CLINICAL SUPPORT (OUTPATIENT)
Dept: PODIATRY | Facility: CLINIC | Age: 77
End: 2017-02-15
Payer: COMMERCIAL

## 2017-02-15 VITALS
HEIGHT: 66 IN | SYSTOLIC BLOOD PRESSURE: 123 MMHG | BODY MASS INDEX: 38.76 KG/M2 | HEART RATE: 70 BPM | DIASTOLIC BLOOD PRESSURE: 57 MMHG | WEIGHT: 241.19 LBS

## 2017-02-15 DIAGNOSIS — L97.529 DIABETIC ULCER OF LEFT FOOT ASSOCIATED WITH TYPE 2 DIABETES MELLITUS: Primary | ICD-10-CM

## 2017-02-15 DIAGNOSIS — E11.621 DIABETIC ULCER OF LEFT FOOT ASSOCIATED WITH TYPE 2 DIABETES MELLITUS: Primary | ICD-10-CM

## 2017-02-15 DIAGNOSIS — Z98.890 POST-OPERATIVE STATE: Primary | ICD-10-CM

## 2017-02-15 PROCEDURE — 99999 PR PBB SHADOW E&M-EST. PATIENT-LVL III: CPT | Mod: PBBFAC,,,

## 2017-02-15 RX ORDER — MINOCYCLINE HYDROCHLORIDE 100 MG/1
100 CAPSULE ORAL
COMMUNITY
Start: 2017-01-21 | End: 2017-11-30 | Stop reason: ALTCHOICE

## 2017-02-15 RX ORDER — DOXYCYCLINE 100 MG/1
CAPSULE ORAL
COMMUNITY
Start: 2017-02-02 | End: 2017-11-30 | Stop reason: ALTCHOICE

## 2017-02-15 NOTE — MR AVS SNAPSHOT
[facility]  [address] [facility] [address]  Phone: [phone]  Fax: [phone]                  Raymond Stuart   2/15/2017 1:00 PM   Clinical Support    Description:  Male : 1940   Provider:  PODIATRY NURSE, [facility]   Department:  [facility]           Reason for Visit     Other Misc     Follow-up     Diabetes Mellitus                To Do List           Future Appointments        Provider Department Dept Phone    2017 2:20 PM Nic Castillo DPM [facility] - Podiatry [phone]    3/3/2017 3:40 PM Nic Castillo DPM [facility] - Podiatry [phone]    3/17/2017 3:40 PM Nic Castillo DPM [facility] - Podiatry [phone]    3/17/2017 4:00 PM [facility] XR2 [facility] [phone]    2017 3:15 PM Adele Gomez MD [facility] Dermatology [phone]      Your Future Surgeries/Procedures     2017   Surgery with Nic Castillo DPM   [facility])    [address] [facility] [address] [address] [phone]              Goals (5 Years of Data)     None      [facility]     [facility] Care Line -  Assistance  Registered nurses in the [facility] provide clinical advisement, health education, appointment booking, and other advisory services.  Call for this free service at [phone].             Medications           Message regarding Medications     Verify the changes and/or additions to your medication regime listed below are the same as discussed with your clinician today.  If any of these changes or additions are incorrect, please notify your healthcare provider.             Verify that the below list of medications is an accurate representation of the medications you are currently taking.  If none reported, the list may be blank. If incorrect, please contact your healthcare provider. Carry this list with you in case of emergency.           Current Medications     atenolol  "(TENORMIN) 100 MG tablet Take 1 tablet by mouth  daily    clopidogrel (PLAVIX) 75 mg tablet Take 1 tablet by mouth  daily    diphenhydrAMINE (BENADRYL) 50 mg/mL injection     doxycycline (VIBRAMYCIN) 100 MG Cap     fenofibrate micronized (LOFIBRA) 134 MG Cap Take 1 capsule (134 mg total) by mouth once daily.    gabapentin (NEURONTIN) 600 MG tablet Take 2 tablets (1,200 mg total) by mouth 2 (two) times daily.    glipizide-metformin (METAGLIP) 2.5-500 mg per tablet Take 2 tablets by mouth 2 (two) times daily before meals. 2 po qam, 1 po qpm    INVANZ 1 gram injection     lisinopril-hydrochlorothiazide (PRINZIDE,ZESTORETIC) 20-12.5 mg per tablet Take 2 tablets by mouth once daily.    minocycline (MINOCIN,DYNACIN) 100 MG capsule     niacin 1,000 mg TbSR Take 1 tablet by mouth Daily.    rosuvastatin (CRESTOR) 40 MG Tab Take 1 tablet (40 mg total) by mouth once daily.    SANTYL ointment     VANCOMYCIN HCL IN DEXTROSE 5 % (VANCOMYCIN IN DEXTROSE 5 %) 1 gram/200 mL PgBk     warfarin (COUMADIN) 5 MG tablet TAKE 1 AND 1/2 TABLETS BY  MOUTH ON WEDNESDAY AND  SUNDAY AND 1 TABLET ALL  REMAINING DAY AS DIRECTED  BY THE COUMADIN CLINIC.           Clinical Reference Information           Your Vitals Were     BP Pulse Height Weight BMI    123/57 (BP Location: Right arm, Patient Position: Sitting, BP Method: Automatic) 70 5' 6" (1.676 m) 109.4 kg (241 lb 2.9 oz) 38.93 kg/m2      Blood Pressure          Most Recent Value    BP  (!)  123/57      Allergies as of 2/15/2017     Sulfa (Sulfonamide Antibiotics)    Sulfamethoxazole    Trimethoprim      Immunizations Administered on Date of Encounter - 2/15/2017     None      [facility] Sign-Up     Activating your [facility] account is as easy as 1-2-3!     1) Visit [email or web address], select Sign Up Now, enter this activation code and your date of birth, then select Next.  [email or web address]  Expires: 2/19/2017  4:36 PM      2) Create a username and password to use when you visit [facility] in the " future and select a security question in case you lose your password and select Next.    3) Enter your e-mail address and click Sign Up!    Additional Information  If you have questions, please e-mail [email or web address] or call [phone] to talk to our [facility] staff. Remember, [facility] is NOT to be used for urgent needs. For medical emergencies, dial 911.         Language Assistance Services     ATTENTION: Language assistance services are available, free of charge. Please call [phone].      ATENCIÓN: Si habla español, tiene a villagran disposición servicios gratuitos de asistencia lingüística. Llame al [phone].     CHÚ Ý: N?u b?n nói Ti?ng Vi?t, có các d?ch v? h? tr? ngôn ng? mi?n phí dành cho b?n. G?i s? [phone].         [facility] complies with applicable Federal civil rights laws and does not discriminate on the basis of race, color, national origin, age, disability, or sex.

## 2017-02-15 NOTE — PRE-PROCEDURE INSTRUCTIONS
Pre op instructions reviewed with patient per phone:    To confirm, Your surgeon has instructed you:  Surgery is scheduled 2/17/17 at 0700.      Please report to [facility] 1[facility] floor main lobby by 0530 Pre admit nurse will call day prior to surgery to verify arrival time.      INSTRUCTIONS IMPORTANT!!!  ¨ Do not eat, drink, or smoke after 12 midnight-including water. OK to brush teeth, no gum, candy or mints!    ¨ Take only these medicines with a small swallow of water-morning of surgery.  Atenolol    ____  Do not wear makeup, including mascara.  ____  No powder, lotions or creams to surgical area.  ____  Please remove all jewelry, including piercings and leave at home.  ____  No money or valuables needed. Please leave at home.  ____  Please bring identification and insurance information to hospital.  ____  If going home the same day, arrange for a ride home. You will not be able to   drive if Anesthesia was used.  ____  Children, under 12 years old, must remain in the waiting room with an adult.  They are not allowed in patient areas.  ____  Wear loose fitting clothing. Allow for dressings, bandages.  ____  Stop Aspirin, Ibuprofen, Motrin and Aleve at least 5-7 days before surgery, unless otherwise instructed by your doctor, or the nurse.   You MAY use Tylenol/acetaminophen until day of surgery.  ____  If you take diabetic medication, do not take am of surgery unless instructed by   Doctor.  ____ Stop taking any Fish Oil supplement or any Vitamins that contain Vitamin E at least 5 days prior to surgery.          Bathing Instructions-- The night before surgery and the morning prior to coming to the hospital:   -Do not shave the surgical area.   -Shower and wash your hair and body as usual with your regular soap and shampoo.   -Rinse your hair and body completely.   -Use one packet of hibiclens to wash the surgical site (using your hand) gently for 5 minutes.  Do not scrub you skin too  hard.   -Do not use hibiclens on your head, face, or genitals.   -Do not wash with regular soap after you use the hibiclens.   -Rinse your body thoroughly.   -Dry with clean, soft towel.  Do not use lotion, cream, deodorant, or powders on   the surgical site.    Use antibacterial soap in place of hibiclens if your surgery is on the head, face or genitals.         Surgical Site Infection    Prevention of surgical site infections:     -Keep incisions clean and dry.   -Do not soak/submerge incisions in water until completely healed.   -Do not apply lotions, powders, creams, or deodorants to site.   -Always make sure hands are cleaned with antibacterial soap/ alcohol-based   prior to touching the surgical site.  (This includes doctors, nurses, staff, and yourself.)    Signs and symptoms:   -Redness and pain around the area where you had surgery   -Drainage of cloudy fluid from your surgical wound   -Fever over 100.4  I have read or had read and explained to me, and understand the above information.

## 2017-02-16 ENCOUNTER — ANESTHESIA EVENT (OUTPATIENT)
Dept: SURGERY | Facility: HOSPITAL | Age: 77
End: 2017-02-16
Payer: COMMERCIAL

## 2017-02-17 ENCOUNTER — ANESTHESIA (OUTPATIENT)
Dept: SURGERY | Facility: HOSPITAL | Age: 77
End: 2017-02-17
Payer: COMMERCIAL

## 2017-02-17 ENCOUNTER — SURGERY (OUTPATIENT)
Age: 77
End: 2017-02-17

## 2017-02-17 ENCOUNTER — HOSPITAL ENCOUNTER (OUTPATIENT)
Facility: HOSPITAL | Age: 77
Discharge: HOME | End: 2017-02-17
Attending: PODIATRIST | Admitting: PODIATRIST
Payer: COMMERCIAL

## 2017-02-17 VITALS
SYSTOLIC BLOOD PRESSURE: 111 MMHG | RESPIRATION RATE: 16 BRPM | WEIGHT: 239.44 LBS | TEMPERATURE: 98 F | HEART RATE: 72 BPM | DIASTOLIC BLOOD PRESSURE: 59 MMHG | HEIGHT: 66 IN | BODY MASS INDEX: 38.48 KG/M2 | OXYGEN SATURATION: 97 %

## 2017-02-17 DIAGNOSIS — E11.29 TYPE 2 DIABETES MELLITUS WITH MICROALBUMINURIA: ICD-10-CM

## 2017-02-17 DIAGNOSIS — M86.9 OSTEOMYELITIS OF TOE OF LEFT FOOT: Primary | ICD-10-CM

## 2017-02-17 DIAGNOSIS — R80.9 TYPE 2 DIABETES MELLITUS WITH MICROALBUMINURIA: ICD-10-CM

## 2017-02-17 DIAGNOSIS — I73.9 PVD (PERIPHERAL VASCULAR DISEASE): ICD-10-CM

## 2017-02-17 DIAGNOSIS — I10 ESSENTIAL HYPERTENSION: ICD-10-CM

## 2017-02-17 DIAGNOSIS — E11.69 HYPERLIPIDEMIA ASSOCIATED WITH TYPE 2 DIABETES MELLITUS: ICD-10-CM

## 2017-02-17 DIAGNOSIS — E78.5 HYPERLIPIDEMIA ASSOCIATED WITH TYPE 2 DIABETES MELLITUS: ICD-10-CM

## 2017-02-17 LAB
POCT GLUCOSE: 145 MG/DL (ref 70–110)
POCT GLUCOSE: 158 MG/DL (ref 70–110)

## 2017-02-17 PROCEDURE — 25000003 PHARM REV CODE 250: Performed by: PODIATRIST

## 2017-02-17 PROCEDURE — P9045 ALBUMIN (HUMAN), 5%, 250 ML: HCPCS | Performed by: ANESTHESIOLOGY

## 2017-02-17 PROCEDURE — 27201423 OPTIME MED/SURG SUP & DEVICES STERILE SUPPLY: Performed by: PODIATRIST

## 2017-02-17 PROCEDURE — 28820 AMPUTATION OF TOE: CPT | Mod: LT,,, | Performed by: PODIATRIST

## 2017-02-17 PROCEDURE — 71000033 HC RECOVERY, INTIAL HOUR: Performed by: PODIATRIST

## 2017-02-17 PROCEDURE — 37000008 HC ANESTHESIA 1ST 15 MINUTES: Performed by: PODIATRIST

## 2017-02-17 PROCEDURE — 37000009 HC ANESTHESIA EA ADD 15 MINS: Performed by: PODIATRIST

## 2017-02-17 PROCEDURE — 63600175 PHARM REV CODE 636 W HCPCS: Performed by: ANESTHESIOLOGY

## 2017-02-17 PROCEDURE — 25000003 PHARM REV CODE 250: Performed by: NURSE ANESTHETIST, CERTIFIED REGISTERED

## 2017-02-17 PROCEDURE — 88305 TISSUE EXAM BY PATHOLOGIST: CPT | Performed by: PATHOLOGY

## 2017-02-17 PROCEDURE — 63600175 PHARM REV CODE 636 W HCPCS: Performed by: PODIATRIST

## 2017-02-17 PROCEDURE — 36000706: Performed by: PODIATRIST

## 2017-02-17 PROCEDURE — 63600175 PHARM REV CODE 636 W HCPCS: Performed by: NURSE ANESTHETIST, CERTIFIED REGISTERED

## 2017-02-17 PROCEDURE — 71000015 HC POSTOP RECOV 1ST HR: Performed by: PODIATRIST

## 2017-02-17 PROCEDURE — 88305 TISSUE EXAM BY PATHOLOGIST: CPT | Mod: 26,,, | Performed by: PATHOLOGY

## 2017-02-17 PROCEDURE — 36000707: Performed by: PODIATRIST

## 2017-02-17 PROCEDURE — 88311 DECALCIFY TISSUE: CPT | Mod: 26,,, | Performed by: PATHOLOGY

## 2017-02-17 RX ORDER — ATENOLOL 100 MG/1
TABLET ORAL
Qty: 90 TABLET | Refills: 3 | Status: SHIPPED | OUTPATIENT
Start: 2017-02-17 | End: 2017-05-22 | Stop reason: SDUPTHER

## 2017-02-17 RX ORDER — SODIUM CHLORIDE, SODIUM LACTATE, POTASSIUM CHLORIDE, CALCIUM CHLORIDE 600; 310; 30; 20 MG/100ML; MG/100ML; MG/100ML; MG/100ML
INJECTION, SOLUTION INTRAVENOUS CONTINUOUS PRN
Status: DISCONTINUED | OUTPATIENT
Start: 2017-02-17 | End: 2017-02-17

## 2017-02-17 RX ORDER — PHENYLEPHRINE HYDROCHLORIDE 10 MG/ML
INJECTION INTRAVENOUS
Status: DISCONTINUED | OUTPATIENT
Start: 2017-02-17 | End: 2017-02-17

## 2017-02-17 RX ORDER — FENTANYL CITRATE 50 UG/ML
INJECTION, SOLUTION INTRAMUSCULAR; INTRAVENOUS
Status: DISCONTINUED | OUTPATIENT
Start: 2017-02-17 | End: 2017-02-17

## 2017-02-17 RX ORDER — HYDROCODONE BITARTRATE AND ACETAMINOPHEN 5; 325 MG/1; MG/1
1 TABLET ORAL EVERY 4 HOURS PRN
Status: DISCONTINUED | OUTPATIENT
Start: 2017-02-17 | End: 2017-02-17 | Stop reason: HOSPADM

## 2017-02-17 RX ORDER — CEFAZOLIN SODIUM 2 G/50ML
2 SOLUTION INTRAVENOUS
Status: COMPLETED | OUTPATIENT
Start: 2017-02-17 | End: 2017-02-17

## 2017-02-17 RX ORDER — SODIUM CHLORIDE 0.9 % (FLUSH) 0.9 %
3 SYRINGE (ML) INJECTION
Status: DISCONTINUED | OUTPATIENT
Start: 2017-02-17 | End: 2017-02-17 | Stop reason: HOSPADM

## 2017-02-17 RX ORDER — ALBUMIN HUMAN 50 G/1000ML
12.5 SOLUTION INTRAVENOUS ONCE
Status: COMPLETED | OUTPATIENT
Start: 2017-02-17 | End: 2017-02-17

## 2017-02-17 RX ORDER — FENTANYL CITRATE 50 UG/ML
25 INJECTION, SOLUTION INTRAMUSCULAR; INTRAVENOUS EVERY 5 MIN PRN
Status: DISCONTINUED | OUTPATIENT
Start: 2017-02-17 | End: 2017-02-17 | Stop reason: HOSPADM

## 2017-02-17 RX ORDER — HYDROMORPHONE HYDROCHLORIDE 2 MG/ML
0.2 INJECTION, SOLUTION INTRAMUSCULAR; INTRAVENOUS; SUBCUTANEOUS EVERY 5 MIN PRN
Status: DISCONTINUED | OUTPATIENT
Start: 2017-02-17 | End: 2017-02-17 | Stop reason: HOSPADM

## 2017-02-17 RX ORDER — LISINOPRIL AND HYDROCHLOROTHIAZIDE 12.5; 2 MG/1; MG/1
TABLET ORAL
Qty: 180 TABLET | Refills: 3 | Status: SHIPPED | OUTPATIENT
Start: 2017-02-17 | End: 2017-05-22 | Stop reason: SDUPTHER

## 2017-02-17 RX ORDER — LIDOCAINE HYDROCHLORIDE 10 MG/ML
INJECTION INFILTRATION; PERINEURAL
Status: DISCONTINUED | OUTPATIENT
Start: 2017-02-17 | End: 2017-02-17

## 2017-02-17 RX ORDER — PROPOFOL 10 MG/ML
VIAL (ML) INTRAVENOUS
Status: DISCONTINUED | OUTPATIENT
Start: 2017-02-17 | End: 2017-02-17

## 2017-02-17 RX ORDER — FENOFIBRATE 134 MG/1
CAPSULE ORAL
Qty: 90 CAPSULE | Refills: 3 | Status: SHIPPED | OUTPATIENT
Start: 2017-02-17 | End: 2017-05-22 | Stop reason: SDUPTHER

## 2017-02-17 RX ORDER — LIDOCAINE HYDROCHLORIDE 10 MG/ML
INJECTION, SOLUTION EPIDURAL; INFILTRATION; INTRACAUDAL; PERINEURAL
Status: DISCONTINUED | OUTPATIENT
Start: 2017-02-17 | End: 2017-02-17 | Stop reason: HOSPADM

## 2017-02-17 RX ORDER — MEPERIDINE HYDROCHLORIDE 50 MG/ML
12.5 INJECTION INTRAMUSCULAR; INTRAVENOUS; SUBCUTANEOUS ONCE AS NEEDED
Status: DISCONTINUED | OUTPATIENT
Start: 2017-02-17 | End: 2017-02-17 | Stop reason: HOSPADM

## 2017-02-17 RX ORDER — ONDANSETRON 2 MG/ML
INJECTION INTRAMUSCULAR; INTRAVENOUS
Status: DISCONTINUED | OUTPATIENT
Start: 2017-02-17 | End: 2017-02-17

## 2017-02-17 RX ORDER — GLIPIZIDE AND METFORMIN HCL 2.5; 5 MG/1; MG/1
TABLET, FILM COATED ORAL
Qty: 360 TABLET | Refills: 3 | Status: SHIPPED | OUTPATIENT
Start: 2017-02-17 | End: 2017-05-22 | Stop reason: SDUPTHER

## 2017-02-17 RX ORDER — BUPIVACAINE HYDROCHLORIDE 5 MG/ML
INJECTION, SOLUTION PERINEURAL
Status: DISCONTINUED | OUTPATIENT
Start: 2017-02-17 | End: 2017-02-17 | Stop reason: HOSPADM

## 2017-02-17 RX ORDER — LIDOCAINE HYDROCHLORIDE 10 MG/ML
1 INJECTION, SOLUTION EPIDURAL; INFILTRATION; INTRACAUDAL; PERINEURAL ONCE
Status: DISCONTINUED | OUTPATIENT
Start: 2017-02-17 | End: 2017-02-17 | Stop reason: HOSPADM

## 2017-02-17 RX ORDER — PROPOFOL 10 MG/ML
VIAL (ML) INTRAVENOUS CONTINUOUS PRN
Status: DISCONTINUED | OUTPATIENT
Start: 2017-02-17 | End: 2017-02-17

## 2017-02-17 RX ORDER — HYDROCODONE BITARTRATE AND ACETAMINOPHEN 7.5; 325 MG/1; MG/1
1 TABLET ORAL EVERY 6 HOURS PRN
Qty: 60 TABLET | Refills: 0 | Status: SHIPPED | OUTPATIENT
Start: 2017-02-17 | End: 2017-12-29

## 2017-02-17 RX ADMIN — PROPOFOL 40 MCG/KG/MIN: 10 INJECTION, EMULSION INTRAVENOUS at 07:02

## 2017-02-17 RX ADMIN — SODIUM CHLORIDE, SODIUM LACTATE, POTASSIUM CHLORIDE, AND CALCIUM CHLORIDE: 600; 310; 30; 20 INJECTION, SOLUTION INTRAVENOUS at 06:02

## 2017-02-17 RX ADMIN — ONDANSETRON 4 MG: 2 INJECTION, SOLUTION INTRAMUSCULAR; INTRAVENOUS at 07:02

## 2017-02-17 RX ADMIN — LIDOCAINE HYDROCHLORIDE 10 ML: 10 INJECTION, SOLUTION EPIDURAL; INFILTRATION; INTRACAUDAL; PERINEURAL at 07:02

## 2017-02-17 RX ADMIN — PHENYLEPHRINE HYDROCHLORIDE 100 MCG: 10 INJECTION INTRAVENOUS at 07:02

## 2017-02-17 RX ADMIN — FENTANYL CITRATE 50 MCG: 50 INJECTION, SOLUTION INTRAMUSCULAR; INTRAVENOUS at 07:02

## 2017-02-17 RX ADMIN — LIDOCAINE HYDROCHLORIDE 50 MG: 10 INJECTION, SOLUTION INFILTRATION; PERINEURAL at 07:02

## 2017-02-17 RX ADMIN — CEFAZOLIN SODIUM 2 G: 2 SOLUTION INTRAVENOUS at 06:02

## 2017-02-17 RX ADMIN — BUPIVACAINE HYDROCHLORIDE 30 ML: 5 INJECTION, SOLUTION PERINEURAL at 07:02

## 2017-02-17 RX ADMIN — PROPOFOL 40 MG: 10 INJECTION, EMULSION INTRAVENOUS at 07:02

## 2017-02-17 RX ADMIN — ALBUMIN (HUMAN) 12.5 G: 12.5 SOLUTION INTRAVENOUS at 08:02

## 2017-02-17 NOTE — ANESTHESIA POSTPROCEDURE EVALUATION
"Anesthesia Post Evaluation    Patient: Raymond D Stuart    Procedure(s) Performed: Procedure(s) (LRB):  AMPUTATION- 5th TOE (Left)    Final Anesthesia Type: general  Patient location during evaluation: PACU  Patient participation: Yes- Able to Participate  Level of consciousness: awake and alert  Post-procedure vital signs: reviewed and stable  Pain management: adequate  Airway patency: patent  PONV status at discharge: No PONV  Anesthetic complications: no      Cardiovascular status: blood pressure returned to baseline  Respiratory status: unassisted and spontaneous ventilation  Hydration status: euvolemic  Follow-up not needed.        Visit Vitals    BP (!) 111/59    Pulse 72    Temp 36.7 °C (98.1 °F) (Temporal)    Resp 16    Ht 5' 6" (1.676 m)    Wt 108.6 kg (239 lb 6.7 oz)    SpO2 97%    BMI 38.64 kg/m2       Pain/Emerita Score: Pain Assessment Performed: Yes (2/17/2017  8:45 AM)  Presence of Pain: denies (2/17/2017  8:45 AM)  Emerita Score: 9 (2/17/2017  8:45 AM)      "

## 2017-02-17 NOTE — ANESTHESIA PREPROCEDURE EVALUATION
02/17/2017  Raymond Stuart is a 76 y.o., male.    [facility] Anesthesia Evaluation    I have reviewed the Patient Summary Reports.    I have reviewed the Nursing Notes.   I have reviewed the Medications.     Review of Systems  Anesthesia Hx:  No problems with previous Anesthesia  Neg history of prior surgery. Denies Family Hx of Anesthesia complications.   Denies Personal Hx of Anesthesia complications.   Social:  Non-Smoker, No Alcohol Use    Hematology/Oncology:     Oncology Normal    -- Anemia:   EENT/Dental:EENT/Dental Normal   Cardiovascular:   Exercise tolerance: poor Hypertension, well controlled Dysrhythmias atrial fibrillation PVD    Pulmonary:   Sleep Apnea    Renal/:  Renal/ Normal     Hepatic/GI:  Hepatic/GI Normal    Musculoskeletal:  Musculoskeletal Normal    Neurological:  Neurology Normal    Endocrine:   Diabetes, type 2        Physical Exam  General:  Obesity    Airway/Jaw/Neck:  Airway Findings: Mouth Opening: Normal Mallampati: I        Eyes/Ears/Nose:  EYES/EARS/NOSE FINDINGS: Normal   Dental:  Dental Findings: In tact   Chest/Lungs:  Chest/Lungs Findings: Clear to auscultation, Normal Respiratory Rate     Heart/Vascular:  Heart Findings: Rhythm: Irregularly Irregular  Sounds: Normal  Heart murmur: negative Vascular Findings: Normal    Abdomen:  Abdomen Findings:  Soft     Musculoskeletal:  Musculoskeletal Findings: Normal   Skin:  Skin Findings: Normal    Mental Status:  Mental Status Findings:  Alert and Oriented         Anesthesia Plan  Type of Anesthesia, risks & benefits discussed:  Anesthesia Type:  MAC  Patient's Preference:   Intra-op Monitoring Plan:   Intra-op Monitoring Plan Comments:   Post Op Pain Control Plan:   Post Op Pain Control Plan Comments:   Induction:   IV  Beta Blocker:  Patient is on a Beta-Blocker and has received one dose within the past 24 hours (No further  documentation required).       Informed Consent: Patient understands risks and agrees with Anesthesia plan.  Questions answered.   ASA Score: 3     Day of Surgery Review of History & Physical: I have interviewed and examined the patient. I have reviewed the patient's H&P dated:  There are no significant changes.          Ready For Surgery From Anesthesia Perspective.

## 2017-02-17 NOTE — IP AVS SNAPSHOT
[facility]  [id number] [place] [address] [address] [place] [id number]           Patient Discharge Instructions     Our goal is to set you up for success. This packet includes information on your condition, medications, and your home care. It will help you to care for yourself so you don't get sicker and need to go back to the hospital.     Please ask your nurse if you have any questions.        There are many details to remember when preparing to leave the hospital. Here is what you will need to do:    1. Take your medicine. If you are prescribed medications, review your Medication List in the following pages. You may have new medications to  at the pharmacy and others that you'll need to stop taking. Review the instructions for how and when to take your medications. Talk with your doctor or nurses if you are unsure of what to do.     2. Go to your follow-up appointments. Specific follow-up information is listed in the following pages. Your may be contacted by a transition nurse or clinical provider about future appointments. Be sure we have all of the phone numbers to reach you, if needed. Please contact your provider's office if you are unable to make an appointment.     3. Watch for warning signs. Your doctor or nurse will give you detailed warning signs to watch for and when to call for assistance. These instructions may also include educational information about your condition. If you experience any of warning signs to your health, call your doctor.               ** Verify the list of medication(s) below is accurate and up to date. Carry this with you in case of emergency. If your medications have changed, please notify your healthcare provider.             Medication List      START taking these medications        Additional Info                      hydrocodone-acetaminophen 7.5-325mg 7.5-325 mg per tablet   Commonly known as:  NORCO   Quantity:  60 tablet   Refills:  0   Dose:  1 tablet     Instructions:  Take 1 tablet by mouth every 6 (six) hours as needed for Pain.     Begin Date    AM    Noon    PM    Bedtime         CHANGE how you take these medications        Additional Info                      fenofibrate micronized 134 MG Cap   Commonly known as:  LOFIBRA   Quantity:  90 capsule   Refills:  3   Dose:  134 mg   What changed:  when to take this    Instructions:  Take 1 capsule (134 mg total) by mouth once daily.     Begin Date    AM    Noon    PM    Bedtime       rosuvastatin 40 MG Tab   Commonly known as:  CRESTOR   Quantity:  90 tablet   Refills:  3   Dose:  40 mg   What changed:  when to take this    Instructions:  Take 1 tablet (40 mg total) by mouth once daily.     Begin Date    AM    Noon    PM    Bedtime       warfarin 5 MG tablet   Commonly known as:  COUMADIN   Quantity:  96 tablet   Refills:  11   What changed:  See the new instructions.    Instructions:  TAKE 1 AND 1/2 TABLETS BY  MOUTH ON WEDNESDAY AND  SUNDAY AND 1 TABLET ALL  REMAINING DAY AS DIRECTED  BY THE COUMADIN CLINIC.     Begin Date    AM    Noon    PM    Bedtime         CONTINUE taking these medications        Additional Info                      atenolol 100 MG tablet   Commonly known as:  TENORMIN   Quantity:  90 tablet   Refills:  3    Instructions:  Take 1 tablet by mouth  daily     Begin Date    AM    Noon    PM    Bedtime       clopidogrel 75 mg tablet   Commonly known as:  PLAVIX   Quantity:  90 tablet   Refills:  3    Instructions:  Take 1 tablet by mouth  daily     Begin Date    AM    Noon    PM    Bedtime       diphenhydrAMINE 50 mg/mL injection   Commonly known as:  BENADRYL   Refills:  0      Begin Date    AM    Noon    PM    Bedtime       doxycycline 100 MG Cap   Commonly known as:  VIBRAMYCIN   Refills:  0      Begin Date    AM    Noon    PM    Bedtime       gabapentin 600 MG tablet   Commonly known as:  NEURONTIN   Quantity:  180 tablet   Refills:  3   Dose:  1200 mg    Instructions:  Take 2 tablets (1,200 mg  total) by mouth 2 (two) times daily.     Begin Date    AM    Noon    PM    Bedtime       glipizide-metformin 2.5-500 mg per tablet   Commonly known as:  METAGLIP   Quantity:  360 tablet   Refills:  3   Dose:  2 tablet    Instructions:  Take 2 tablets by mouth 2 (two) times daily before meals. 2 po qam, 1 po qpm     Begin Date    AM    Noon    PM    Bedtime       INVANZ 1 gram injection   Refills:  0   Generic drug:  ertapenem      Begin Date    AM    Noon    PM    Bedtime       lisinopril-hydrochlorothiazide 20-12.5 mg per tablet   Commonly known as:  PRINZIDE,ZESTORETIC   Quantity:  180 tablet   Refills:  3   Dose:  2 tablet    Instructions:  Take 2 tablets by mouth once daily.     Begin Date    AM    Noon    PM    Bedtime       minocycline 100 MG capsule   Commonly known as:  MINOCIN,DYNACIN   Refills:  0   Dose:  100 mg    Instructions:  Take 100 mg by mouth.     Begin Date    AM    Noon    PM    Bedtime       niacin 1,000 mg Tbsr   Refills:  0   Dose:  1 tablet    Instructions:  Take 1 tablet by mouth Daily.     Begin Date    AM    Noon    PM    Bedtime       SANTYL ointment   Refills:  0   Generic drug:  collagenase      Begin Date    AM    Noon    PM    Bedtime       vancomycin 1 gram/200 mL Pgbk 1000 mg in dextrose 5 % 200 mL IVPB   Commonly known as:  VANCOCIN   Refills:  0      Begin Date    AM    Noon    PM    Bedtime            Where to Get Your Medications      You can get these medications from any pharmacy     Bring a paper prescription for each of these medications     hydrocodone-acetaminophen 7.5-325mg 7.5-325 mg per tablet                  Please bring to all follow up appointments:    1. A copy of your discharge instructions.  2. All medicines you are currently taking in their original bottles.  3. Identification and insurance card.    Please arrive 15 minutes ahead of scheduled appointment time.    Please call 24 hours in advance if you must reschedule your appointment and/or time.        Your  Scheduled Appointments     Feb 24, 2017  2:20 PM CST   Post OP with Nic Castillo DPM   [facility] ([facility])    [address] [facility] [place] [address]   [phone]            Mar 03, 2017  3:40 PM CST   Post OP with Nic Castillo DPM   [facility] ([facility])    [address] [facility] [place] [address]   [phone]            Mar 17, 2017  3:40 PM CDT   Post OP with Nic Castillo DPM   [facility] ([facility])    [address] [facility] [place] [address]   [phone]            Mar 17, 2017  4:00 PM CDT   Diagnostic Xray with [facility] XR2   [facility] ([facility])    [address] [facility] [place] [address]   [phone]            Apr 27, 2017  3:15 PM CDT   Established Patient Visit with Adele Gomez MD   [facility] ([facility])    [address] [facility] [place] [address]   [phone]                Discharge Instructions     Future Orders    Call MD for:  persistent nausea and vomiting     Call MD for:  redness, tenderness, or signs of infection (pain, swelling, redness, odor or green/yellow discharge around incision site)     Call MD for:  severe uncontrolled pain     Call MD for:  temperature >100.4     Diet general     Questions:    Total calories:      Fat restriction, if any:      Protein restriction, if any:      Na restriction, if any:      Fluid restriction:      Additional restrictions:      Leave dressing on - Keep it clean, dry, and intact until clinic visit         Discharge Instructions       Keep dressing dry, clean, and intact on left foot. Do not get left foot wet. Rest and elevate left foot for first 24-48 hours after surgery. Ambulate with surgical shoe to left HEEL ONLY.   Take pain medication as prescribed.   Follow written post operative instructions as given on last podiatry visit. Use contact information for Dr. Nic Castillo given on written post operative instructions.          When to seek medical care  Call your healthcare provider if you  have any of the following:  · More pain, redness, swelling, bleeding, or foul-smelling discharge around the incision area  · Fever of 100.4°F (38ºC) or higher or as directed by your healthcare provider  · Shaking chills  · Vomiting or nausea that doesn't go away  · Numbness, coldness, or tingling around the incision area, or changes in skin color  · Opening of the sutures or wound     General Information:    1. Do not drink alcoholic beverages including beer for 24 hours or as long as you are on pain medication..  2. Do not drive a motor vehicle, operate machinery or power tools, or signs legal papers for 24 hours or as long as you are on pain medication.   3. You may experience light-headedness, dizziness, and sleepiness following surgery. Please do not stay alone. A responsible adult should be with you for this 24 hour period.  4. Go home and rest.  5. Progress slowly to a normal diet unless instructed.  Otherwise, begin with liquids such as soft drinks, then soup and crackers working up to solid foods. Drink plenty of nonalcoholic fluids.  6. Certain anesthetics and pain medications produce nausea and vomiting in certain individuals. If nausea becomes a problem at home, call you doctor.  7. A nurse will be calling you sometime after surgery. Do not be alarmed. This is our way of finding out how you are doing.  8. Several times every hour while you are awake, take 2-3 deep breaths and cough. If you had stomach surgery hold a pillow or rolled towel firmly against your stomach before you cough. This will help with any pain the cough might cause.  9. Several times every hour while you are awake, pump and flex your feet 5-6 times and do foot circles. This will help prevent blood clots.  10. Call your doctor for severe pain, bleeding, fever, or signs or symptoms of infection (pain, swelling, redness, foul odor, drainage).  11. You can contact your doctor anytime by [phone] for the [facility] (at  [employer] or [phone] for the [facility].   [email or web address] is another way to contact your doctor if you are an active participant online with [facility].      Acetaminophen; Hydrocodone tablets or capsules  What is this medicine?  ACETAMINOPHEN; HYDROCODONE (a set a KAT ba fen; moris droe KOE done) is a pain reliever. It is used to treat moderate to severe pain.  How should I use this medicine?  Take this medicine by mouth with a glass of water. Follow the directions on the prescription label. You can take it with or without food. If it upsets your stomach, take it with food. Do not take your medicine more often than directed.  A special MedGuide will be given to you by the pharmacist with each prescription and refill. Be sure to read this information carefully each time.  Talk to your pediatrician regarding the use of this medicine in children. Special care may be needed.  What side effects may I notice from receiving this medicine?  Side effects that you should report to your doctor or health care professional as soon as possible:  · allergic reactions like skin rash, itching or hives, swelling of the face, lips, or tongue  · breathing problems  · confusion  · redness, blistering, peeling or loosening of the skin, including inside the mouth  · signs and symptoms of low blood pressure like dizziness; feeling faint or lightheaded, falls; unusually weak or tired  · trouble passing urine or change in the amount of urine  · yellowing of the eyes or skin  Side effects that usually do not require medical attention (report to your doctor or health care professional if they continue or are bothersome):  · constipation  · dry mouth  · nausea, vomiting  · tiredness  What may interact with this medicine?  This medicine may interact with the following medications:  · alcohol  · antiviral medicines for HIV or AIDS  · atropine  · antihistamines for allergy, cough and cold  · certain antibiotics  like erythromycin, clarithromycin  · certain medicines for anxiety or sleep  · certain medicines for bladder problems like oxybutynin, tolterodine  · certain medicines for depression like amitriptyline, fluoxetine, sertraline  · certain medicines for fungal infections like ketoconazole and itraconazole  · certain medicines for Parkinson's disease like benztropine, trihexyphenidyl  · certain medicines for seizures like carbamazepine, phenobarbital, phenytoin, primidone  · certain medicines for stomach problems like dicyclomine, hyoscyamine  · certain medicines for travel sickness like scopolamine  · general anesthetics like halothane, isoflurane, methoxyflurane, propofol  · ipratropium  · local anesthetics like lidocaine, pramoxine, tetracaine  · MAOIs like Carbex, Eldepryl, Marplan, Nardil, and Parnate  · medicines that relax muscles for surgery  · other medicines with acetaminophen  · other narcotic medicines for pain or cough  · phenothiazines like chlorpromazine, mesoridazine, prochlorperazine, thioridazine  · rifampin  What if I miss a dose?  If you miss a dose, take it as soon as you can. If it is almost time for your next dose, take only that dose. Do not take double or extra doses.  Where should I keep my medicine?  Keep out of the reach of children. This medicine can be abused. Keep your medicine in a safe place to protect it from theft. Do not share this medicine with anyone. Selling or giving away this medicine is dangerous and against the law.  This medicine may cause accidental overdose and death if it taken by other adults, children, or pets. Mix any unused medicine with a substance like cat litter or coffee grounds. Then throw the medicine away in a sealed container like a sealed bag or a coffee can with a lid. Do not use the medicine after the expiration date.  Store at room temperature between 15 and 30 degrees C (59 and 86 degrees F).  What should I tell my health care provider before I take this  medicine?  They need to know if you have any of these conditions:  · brain tumor  · Crohn's disease, inflammatory bowel disease, or ulcerative colitis  · drug abuse or addiction  · head injury  · heart or circulation problems  · if you often drink alcohol  · kidney disease or problems going to the bathroom  · liver disease  · lung disease, asthma, or breathing problems  · an unusual or allergic reaction to acetaminophen, hydrocodone, other opioid analgesics, other medicines, foods, dyes, or preservatives  · pregnant or trying to get pregnant  · breast-feeding  What should I watch for while using this medicine?  Tell your doctor or health care professional if your pain does not go away, if it gets worse, or if you have new or a different type of pain. You may develop tolerance to the medicine. Tolerance means that you will need a higher dose of the medicine for pain relief. Tolerance is normal and is expected if you take the medicine for a long time.  Do not suddenly stop taking your medicine because you may develop a severe reaction. Your body becomes used to the medicine. This does NOT mean you are addicted. Addiction is a behavior related to getting and using a drug for a non-medical reason. If you have pain, you have a medical reason to take pain medicine. Your doctor will tell you how much medicine to take. If your doctor wants you to stop the medicine, the dose will be slowly lowered over time to avoid any side effects.  There are different types of narcotic medicines (opiates). If you take more than one type at the same time or if you are taking another medicine that also causes drowsiness, you may have more side effects. Give your health care provider a list of all medicines you use. Your doctor will tell you how much medicine to take. Do not take more medicine than directed. Call emergency for help if you have problems breathing or unusual sleepiness.  Do not take other medicines that contain acetaminophen  "with this medicine. Always read labels carefully. If you have questions, ask your doctor or pharmacist.  If you take too much acetaminophen get medical help right away. Too much acetaminophen can be very dangerous and cause liver damage. Even if you do not have symptoms, it is important to get help right away.  You may get drowsy or dizzy. Do not drive, use machinery, or do anything that needs mental alertness until you know how this medicine affects you. Do not stand or sit up quickly, especially if you are an older patient. This reduces the risk of dizzy or fainting spells. Alcohol may interfere with the effect of this medicine. Avoid alcoholic drinks.  The medicine will cause constipation. Try to have a bowel movement at least every 2 to 3 days. If you do not have a bowel movement for 3 days, call your doctor or health care professional.  Your mouth may get dry. Chewing sugarless gum or sucking hard candy, and drinking plenty of water may help. Contact your doctor if the problem does not go away or is severe.  Date Last Reviewed:   NOTE:This sheet is a summary. It may not cover all possible information. If you have questions about this medicine, talk to your doctor, pharmacist, or health care provider. Copyright© 2016 [employer]              Admission Information     Date & Time Provider Department CSN    2/17/2017  5:17 AM Nic Castillo DPM [facility] -  [id number]      Care Providers     Provider Role Specialty Primary office phone    iNc Castillo DPM Attending Provider Podiatry [phone]    Nic Castillo DPM Surgeon  Podiatry [phone]      Your Vitals Were     BP Pulse Temp Resp    94/53 (BP Location: Right arm, Patient Position: Lying, BP Method: Automatic) 68 97.2 °F (36.2 °C) (Temporal) 20    Height Weight SpO2 BMI    5' 6" (1.676 m) 108.6 kg (239 lb 6.7 oz) 94% 38.64 kg/m2      Recent Lab Values        1/13/2014 7/15/2014 11/10/2014 8/10/2015 11/9/2015 5/9/2016 8/10/2016 " 11/9/2016      7:06 AM  7:29 AM  7:15 AM  7:11 AM  7:15 AM  7:12 AM  7:06 AM  7:06 AM    A1C 7.1 (H) 8.1 (H) 7.0 (H) 7.6 (H) 7.1 (H) 8.0 (H) 8.1 (H) 6.9 (H)    Comment for A1C at  7:06 AM on 8/10/2016:  According to ADA guidelines, hemoglobin A1C <7.0% represents  optimal control in non-pregnant diabetic patients.  Different  metrics may apply to specific populations.   Standards of Medical Care in Diabetes - 2016.  For the purpose of screening for the presence of diabetes:  <5.7%     Consistent with the absence of diabetes  5.7-6.4%  Consistent with increasing risk for diabetes   (prediabetes)  >or=6.5%  Consistent with diabetes  Currently no consensus exists for use of hemoglobin A1C  for diagnosis of diabetes for children.      Comment for A1C at  7:06 AM on 11/9/2016:  According to ADA guidelines, hemoglobin A1C <7.0% represents  optimal control in non-pregnant diabetic patients.  Different  metrics may apply to specific populations.   Standards of Medical Care in Diabetes - 2016.  For the purpose of screening for the presence of diabetes:  <5.7%     Consistent with the absence of diabetes  5.7-6.4%  Consistent with increasing risk for diabetes   (prediabetes)  >or=6.5%  Consistent with diabetes  Currently no consensus exists for use of hemoglobin A1C  for diagnosis of diabetes for children.        Pending Labs     Order Current Status    Specimen to Pathology - Surgery Collected (02/17/17 0736)      Allergies as of 2/17/2017        Reactions    Sulfa (Sulfonamide Antibiotics)     Other reaction(s): Stomach upset    Sulfamethoxazole Rash    Trimethoprim Rash    Bactrim      [facility] On Call     [facility] On Call Nurse Care Line - 24/7 Assistance  Unless otherwise directed by your provider, please contact [facility], our nurse care line that is available for 24/7 assistance.     Registered nurses in the [facility] provide clinical advisement, health education, appointment booking, and other advisory  services.  Call for this free service at [phone].        Advance Directives     An advance directive is a document which, in the event you are no longer able to make decisions for yourself, tells your healthcare team what kind of treatment you do or do not want to receive, or who you would like to make those decisions for you.  If you do not currently have an advance directive, [facility] encourages you to create one.  For more information call:  [phone]), [phone] ([phone]),  or log on to [email or web address].        Smoking Cessation     If you would like to quit smoking:   You may be eligible for free services if you are a [place] resident and started smoking cigarettes before September 1, 1988.  Call the Smoking Cessation Trust (SCT) toll free at (075) 781-6952 or (116) 512-8725.   Call 3-316-QUIT-NOW if you do not meet the above criteria.            Language Assistance Services     ATTENTION: Language assistance services are available, free of charge. Please call [phone].      ATENCIÓN: Si habla español, tiene a villagran disposición servicios gratuitos de asistencia lingüística. Llame al [phone].     CHÚ Ý: N?u b?n nói Ti?ng Vi?t, có các d?ch v? h? tr? ngôn ng? mi?n phí dành cho b?n. G?i s? [phone].        Coumadin Discharge Instructions                         Diabetes Discharge Instructions                                   [facility] Sign-Up     Activating your [facility] account is as easy as 1-2-3!     1) Visit [email or web address], select Sign Up Now, enter this activation code and your date of birth, then select Next.  [email or web address]  Expires: 2/19/2017  4:36 PM      2) Create a username and password to use when you visit [facility] in the future and select a security question in case you lose your password and select Next.    3) Enter your e-mail address and click Sign Up!    Additional Information  If you have questions, please e-mail  [email or web address] or call [phone] to talk to our [facility] staff. Remember, [facility] is NOT to be used for urgent needs. For medical emergencies, dial 911.          [facility] complies with applicable Federal civil rights laws and does not discriminate on the basis of race, color, national origin, age, disability, or sex.

## 2017-02-17 NOTE — PROGRESS NOTES
Patient came in to sign a consent for surgery that is scheduled for Friday, 02/17/2017 and to get the dressing changed on his left foot. There are ulcers on the lateral side of the left 5th toe and under the bottom of the foot on the lateral side. The surgery was explained by  and the patient signed the consent with no problem. The cleaning process of the left foot before surgery was explained to the patient (clean the foot after bathing in the shower with hibiclens the night before the surgery and the morning of). The patient stated understanding.The patient tolerated the wound care/dressing change well. There was Normgel and sterile gauze applied to both areas and the left foot was wrapped with kerlex. The visit ended well.

## 2017-02-17 NOTE — TRANSFER OF CARE
"Anesthesia Transfer of Care Note    Patient: Raymond MURO Stuart    Procedure(s) Performed: Procedure(s) (LRB):  AMPUTATION- 5th TOE (Left)    Patient location: PACU    Anesthesia Type: MAC    Transport from OR: Transported from OR on room air with adequate spontaneous ventilation    Post pain: adequate analgesia    Post assessment: no apparent anesthetic complications    Post vital signs: stable    Level of consciousness: awake, alert and oriented    Nausea/Vomiting: no nausea/vomiting    Complications: none          Last vitals:   Visit Vitals    BP (!) 81/53 (BP Location: Right arm, Patient Position: Lying, BP Method: Automatic)    Pulse 74    Temp 36.2 °C (97.2 °F) (Temporal)    Resp 16    Ht 5' 6" (1.676 m)    Wt 108.6 kg (239 lb 6.7 oz)    SpO2 96%    BMI 38.64 kg/m2     "

## 2017-02-17 NOTE — PROGRESS NOTES
Subjective:     Patient ID: Raymond Stuart is a 76 y.o. male.    Chief Complaint: No chief complaint on file.    HPI: This 76 year old male presents for surgical management of infected left 5th toe. Patient states the infection/ulcer has been present for months that has not healed with IV antibiotics. Patient states deformity has not responded to conservative treatment.      Patient Active Problem List   Diagnosis    Hyperlipidemia associated with type 2 diabetes mellitus    Essential hypertension    A-fib    PVD (peripheral vascular disease)    Special screening for malignant neoplasms, colon    S/P femoral-popliteal bypass surgery    S/P peripheral artery angioplasty    Type 2 diabetes mellitus with microalbuminuria    Type 2 diabetes mellitus with diabetic neuropathy    Osteomyelitis of toe of left foot       Current Discharge Medication List      CONTINUE these medications which have NOT CHANGED    Details   atenolol (TENORMIN) 100 MG tablet Take 1 tablet by mouth  daily  Qty: 90 tablet, Refills: 3    Associated Diagnoses: Essential hypertension      doxycycline (VIBRAMYCIN) 100 MG Cap       fenofibrate micronized (LOFIBRA) 134 MG Cap Take 1 capsule (134 mg total) by mouth once daily.  Qty: 90 capsule, Refills: 3    Associated Diagnoses: Hyperlipidemia associated with type 2 diabetes mellitus      gabapentin (NEURONTIN) 600 MG tablet Take 2 tablets (1,200 mg total) by mouth 2 (two) times daily.  Qty: 180 tablet, Refills: 3      glipizide-metformin (METAGLIP) 2.5-500 mg per tablet Take 2 tablets by mouth 2 (two) times daily before meals. 2 po qam, 1 po qpm  Qty: 360 tablet, Refills: 3    Associated Diagnoses: Type 2 diabetes mellitus with microalbuminuria      lisinopril-hydrochlorothiazide (PRINZIDE,ZESTORETIC) 20-12.5 mg per tablet Take 2 tablets by mouth once daily.  Qty: 180 tablet, Refills: 3    Associated Diagnoses: Type 2 diabetes mellitus with microalbuminuria; Essential hypertension       rosuvastatin (CRESTOR) 40 MG Tab Take 1 tablet (40 mg total) by mouth once daily.  Qty: 90 tablet, Refills: 3    Associated Diagnoses: Hyperlipidemia associated with type 2 diabetes mellitus      clopidogrel (PLAVIX) 75 mg tablet Take 1 tablet by mouth  daily  Qty: 90 tablet, Refills: 3    Associated Diagnoses: PVD (peripheral vascular disease)      diphenhydrAMINE (BENADRYL) 50 mg/mL injection       INVANZ 1 gram injection       minocycline (MINOCIN,DYNACIN) 100 MG capsule Take 100 mg by mouth.       niacin 1,000 mg TbSR Take 1 tablet by mouth Daily.      SANTYL ointment       VANCOMYCIN HCL IN DEXTROSE 5 % (VANCOMYCIN IN DEXTROSE 5 %) 1 gram/200 mL PgBk       warfarin (COUMADIN) 5 MG tablet TAKE 1 AND 1/2 TABLETS BY  MOUTH ON WEDNESDAY AND  SUNDAY AND 1 TABLET ALL  REMAINING DAY AS DIRECTED  BY THE COUMADIN CLINIC.  Qty: 96 tablet, Refills: 11             Review of patient's allergies indicates:   Allergen Reactions    Sulfa (sulfonamide antibiotics)      Other reaction(s): Stomach upset    Sulfamethoxazole Rash    Trimethoprim Rash     Bactrim         Past Surgical History   Procedure Laterality Date    Rectoperitoneal fistula closure      Tonsillectomy      Toe surgery       Joint release    Popliteal artery stent  2013    Cardiac electrophysiology mapping and ablation         History reviewed. No pertinent family history.    Social History     Social History    Marital status:      Spouse name: N/A    Number of children: N/A    Years of education: N/A     Occupational History    Retired [employer]     Social History Main Topics    Smoking status: Former Smoker     Packs/day: 4.00     Years: 30.00     Types: Cigarettes     Quit date: 1/9/1994    Smokeless tobacco: Never Used    Alcohol use No    Drug use: No    Sexual activity: Not on file     Other Topics Concern    Not on file     Social History Narrative       Vitals:    02/15/17 1407 02/17/17 0555   BP:  130/60   Pulse:   "74   Resp:  18   Temp:  98.1 °F (36.7 °C)   TempSrc:  Oral   SpO2:  97%   Weight: 108.9 kg (240 lb) 108.6 kg (239 lb 6.7 oz)   Height: 5' 6" (1.676 m) 5' 6" (1.676 m)       Hemoglobin A1C   Date Value Ref Range Status   11/09/2016 6.9 (H) 4.5 - 6.2 % Final     Comment:     According to ADA guidelines, hemoglobin A1C <7.0% represents  optimal control in non-pregnant diabetic patients.  Different  metrics may apply to specific populations.   Standards of Medical Care in Diabetes - 2016.  For the purpose of screening for the presence of diabetes:  <5.7%     Consistent with the absence of diabetes  5.7-6.4%  Consistent with increasing risk for diabetes   (prediabetes)  >or=6.5%  Consistent with diabetes  Currently no consensus exists for use of hemoglobin A1C  for diagnosis of diabetes for children.     08/10/2016 8.1 (H) 4.5 - 6.2 % Final     Comment:     According to ADA guidelines, hemoglobin A1C <7.0% represents  optimal control in non-pregnant diabetic patients.  Different  metrics may apply to specific populations.   Standards of Medical Care in Diabetes - 2016.  For the purpose of screening for the presence of diabetes:  <5.7%     Consistent with the absence of diabetes  5.7-6.4%  Consistent with increasing risk for diabetes   (prediabetes)  >or=6.5%  Consistent with diabetes  Currently no consensus exists for use of hemoglobin A1C  for diagnosis of diabetes for children.     05/09/2016 8.0 (H) 4.5 - 6.2 % Final       Review of Systems   Constitutional: Negative.    Cardiovascular: Positive for claudication and leg swelling.   Gastrointestinal: Negative.    Neurological: Positive for tingling and sensory change.             Objective:      PHYSICAL EXAM: Apperance: Alert and orient in no distress,well developed, and with good attention to grooming and body habits  Lower Extremity Exam  VASCULAR: Dorsalis pedis pulses 0/4 bilateral and Posterior Tibial pulses 0/4 bilateral. Capillary fill time <4 seconds " bilateral. No edema observed bilateral. Varicosities present bilateral. Skin temperature of the lower extremities is warm to cool, proximal to distal. Hair growth absent bilateral. (--) lymphangitis or (--) cellulitis noted left.  DERMATOLOGICAL: (--) edema, (+) decreased periwound erythema, (--) malodor, (+) serous drainage, (--) warmth to left foot.  Ulcer noted to left dorsal 5th toe with mixed fibrotic base and thin granular rim and with a 1 mm yellow/white border and hyperkeratosis surrounding ulcer. Left sub-fifth met head previous ulcer is noted to be closed. The left dorsal lateral fifth digit 0.5 x 0.5 x 0.3 cm. The ulcer does not extend into deeper tissue and (--) sinus tracts exist.  The dorsum surface of the feet are soft and supple.  The plantar aspects of both feet are dry and scaly. Nails 1-5 bilateral dystrophic, mycotic. Webspaces 1-4 clean, dry and without evidence of break in skin integrity bilateral.   NEUROLOGICAL: Light touch, sharp-dull, proprioception all present and equal bilaterally. Protective sensation absent at sites as tested with a [employer] [place] 5.07 monofilament.   MUSCULOSKELETAL: Muscle strength is 5/5 for foot inverters, everters, plantarflexors, and dorsiflexors. Muscle tone is normal. Inspection/palpation of bone, joints and muscles unremarkable with the exception of that noted above.          Assessment:       Osteomyelitis of toe of left foot    Other orders  -     Vital signs; Standing  -     Insert peripheral IV; Standing  -     Notify Physician - Potential Need of Opioid Reversal; Standing  -     lidocaine (PF) 10 mg/ml (1%) injection 10 mg; Inject 1 mL (10 mg total) into the skin once.  -     Notify Anesthesiologist if Patient on Home Insulin Pump; Standing  -     Pulse Oximetry Q4H; Standing  -     Place in Outpatient; Standing  -     cefazolin (ANCEF) 2 gram in dextrose 5% 50 mL IVPB (premix); Inject 50 mLs (2 g total) into the vein Once Pre-Op.  -     POCT glucose;  Standing          Plan:   Osteomyelitis of toe of left foot    Other orders  -     Vital signs; Standing  -     Insert peripheral IV; Standing  -     Notify Physician - Potential Need of Opioid Reversal; Standing  -     lidocaine (PF) 10 mg/ml (1%) injection 10 mg; Inject 1 mL (10 mg total) into the skin once.  -     Notify Anesthesiologist if Patient on Home Insulin Pump; Standing  -     Pulse Oximetry Q4H; Standing  -     Place in Outpatient; Standing  -     cefazolin (ANCEF) 2 gram in dextrose 5% 50 mL IVPB (premix); Inject 50 mLs (2 g total) into the vein Once Pre-Op.  -     POCT glucose; Standing      I counseled the patient on his conditions, their implications and medical management.  Discussed with patient in detail the treatment options for infected ulcer with bone infection, including (1) local wound care with IV antibiotic for 6-8 weeks, or (2) surgical excision(amputation) of infected bone. Risk for treatments including further limb loss, delayed healing, non-healing was also discussed. Patient states he undertands both treatment options and would like to proceed with toe amputation.   Patient to OR 2/17/17 for surgical management for infected left 5th toe. All concerns and questions answered by myself, Dr. Jonathan DPM. No gurantees given nor implied.             Nic Castillo DPM  [facility]

## 2017-02-17 NOTE — BRIEF OP NOTE
[facility] -   Brief Operative Note     SUMMARY     Surgery Date: 2/17/2017     Surgeon(s) and Role:     * Nic Castillo DPM - Primary    Assisting Surgeon: None    Pre-op Diagnosis:  PVD (peripheral vascular disease) [I73.9]  Osteomyelitis of ankle or foot [M86.9]  Ischemic ulcer of left foot [L97.529]    Post-op Diagnosis:  Post-Op Diagnosis Codes:     * PVD (peripheral vascular disease) [I73.9]     * Osteomyelitis of ankle or foot [M86.9]     * Ischemic ulcer of left foot [L97.529]    Procedure(s) (LRB):  AMPUTATION- 5th TOE (Left)    Anesthesia: Local MAC    Description of the findings of the procedure: Left 5th toe amputation    Findings/Key Components: Necrotic Left 5th Proximal Phalanx    Estimated Blood Loss: * No values recorded between 2/17/2017  7:19 AM and 2/17/2017  8:06 AM *         Specimens:   Specimen (12h ago through future)    Start     Ordered    02/17/17 0735  Specimen to Pathology - Surgery  Once     Comments:  1) L 5th toe  2) L 5th toe metatarsal head, clean margins  DX: diabetic toe infection    02/17/17 0736          Discharge Note    SUMMARY     Admit Date: 2/17/2017    Discharge Date and Time:  02/17/2017 8:13 AM    Hospital Course (synopsis of major diagnoses, care, treatment, and services provided during the course of the hospital stay): Home     Final Diagnosis: Post-Op Diagnosis Codes:     * PVD (peripheral vascular disease) [I73.9]     * Osteomyelitis of ankle or foot [M86.9]     * Ischemic ulcer of left foot [L97.529]    Disposition: Home or Self Care    Follow Up/Patient Instructions:     Medications:  Reconciled Home Medications:   Current Discharge Medication List      CONTINUE these medications which have NOT CHANGED    Details   atenolol (TENORMIN) 100 MG tablet Take 1 tablet by mouth  daily  Qty: 90 tablet, Refills: 3    Associated Diagnoses: Essential hypertension      doxycycline (VIBRAMYCIN) 100 MG Cap       fenofibrate micronized (LOFIBRA) 134 MG Cap Take  1 capsule (134 mg total) by mouth once daily.  Qty: 90 capsule, Refills: 3    Associated Diagnoses: Hyperlipidemia associated with type 2 diabetes mellitus      gabapentin (NEURONTIN) 600 MG tablet Take 2 tablets (1,200 mg total) by mouth 2 (two) times daily.  Qty: 180 tablet, Refills: 3      glipizide-metformin (METAGLIP) 2.5-500 mg per tablet Take 2 tablets by mouth 2 (two) times daily before meals. 2 po qam, 1 po qpm  Qty: 360 tablet, Refills: 3    Associated Diagnoses: Type 2 diabetes mellitus with microalbuminuria      lisinopril-hydrochlorothiazide (PRINZIDE,ZESTORETIC) 20-12.5 mg per tablet Take 2 tablets by mouth once daily.  Qty: 180 tablet, Refills: 3    Associated Diagnoses: Type 2 diabetes mellitus with microalbuminuria; Essential hypertension      rosuvastatin (CRESTOR) 40 MG Tab Take 1 tablet (40 mg total) by mouth once daily.  Qty: 90 tablet, Refills: 3    Associated Diagnoses: Hyperlipidemia associated with type 2 diabetes mellitus      clopidogrel (PLAVIX) 75 mg tablet Take 1 tablet by mouth  daily  Qty: 90 tablet, Refills: 3    Associated Diagnoses: PVD (peripheral vascular disease)      diphenhydrAMINE (BENADRYL) 50 mg/mL injection       INVANZ 1 gram injection       minocycline (MINOCIN,DYNACIN) 100 MG capsule Take 100 mg by mouth.       niacin 1,000 mg TbSR Take 1 tablet by mouth Daily.      SANTYL ointment       VANCOMYCIN HCL IN DEXTROSE 5 % (VANCOMYCIN IN DEXTROSE 5 %) 1 gram/200 mL PgBk       warfarin (COUMADIN) 5 MG tablet TAKE 1 AND 1/2 TABLETS BY  MOUTH ON WEDNESDAY AND  SUNDAY AND 1 TABLET ALL  REMAINING DAY AS DIRECTED  BY THE COUMADIN CLINIC.  Qty: 96 tablet, Refills: 11           No discharge procedures on file.

## 2017-02-17 NOTE — DISCHARGE INSTRUCTIONS
Keep dressing dry, clean, and intact on left foot. Do not get left foot wet. Rest and elevate left foot for first 24-48 hours after surgery. Ambulate with surgical shoe to left HEEL ONLY.   Take pain medication as prescribed.   Follow written post operative instructions as given on last podiatry visit. Use contact information for Dr. Nic Castillo given on written post operative instructions.          When to seek medical care  Call your healthcare provider if you have any of the following:  · More pain, redness, swelling, bleeding, or foul-smelling discharge around the incision area  · Fever of 100.4°F (38ºC) or higher or as directed by your healthcare provider  · Shaking chills  · Vomiting or nausea that doesn't go away  · Numbness, coldness, or tingling around the incision area, or changes in skin color  · Opening of the sutures or wound     General Information:    1. Do not drink alcoholic beverages including beer for 24 hours or as long as you are on pain medication..  2. Do not drive a motor vehicle, operate machinery or power tools, or signs legal papers for 24 hours or as long as you are on pain medication.   3. You may experience light-headedness, dizziness, and sleepiness following surgery. Please do not stay alone. A responsible adult should be with you for this 24 hour period.  4. Go home and rest.  5. Progress slowly to a normal diet unless instructed.  Otherwise, begin with liquids such as soft drinks, then soup and crackers working up to solid foods. Drink plenty of nonalcoholic fluids.  6. Certain anesthetics and pain medications produce nausea and vomiting in certain individuals. If nausea becomes a problem at home, call you doctor.  7. A nurse will be calling you sometime after surgery. Do not be alarmed. This is our way of finding out how you are doing.  8. Several times every hour while you are awake, take 2-3 deep breaths and cough. If you had stomach surgery hold a pillow or rolled towel  firmly against your stomach before you cough. This will help with any pain the cough might cause.  9. Several times every hour while you are awake, pump and flex your feet 5-6 times and do foot circles. This will help prevent blood clots.  10. Call your doctor for severe pain, bleeding, fever, or signs or symptoms of infection (pain, swelling, redness, foul odor, drainage).  11. You can contact your doctor anytime by [phone] for the [facility] (at [facility]) or [phone] for the [facility] on [facility].   [email or web address] is another way to contact your doctor if you are an active participant online with [facility].      Acetaminophen; Hydrocodone tablets or capsules  What is this medicine?  ACETAMINOPHEN; HYDROCODONE (a set a KAT ba fen; moris droe KOE done) is a pain reliever. It is used to treat moderate to severe pain.  How should I use this medicine?  Take this medicine by mouth with a glass of water. Follow the directions on the prescription label. You can take it with or without food. If it upsets your stomach, take it with food. Do not take your medicine more often than directed.  A special MedGuide will be given to you by the pharmacist with each prescription and refill. Be sure to read this information carefully each time.  Talk to your pediatrician regarding the use of this medicine in children. Special care may be needed.  What side effects may I notice from receiving this medicine?  Side effects that you should report to your doctor or health care professional as soon as possible:  · allergic reactions like skin rash, itching or hives, swelling of the face, lips, or tongue  · breathing problems  · confusion  · redness, blistering, peeling or loosening of the skin, including inside the mouth  · signs and symptoms of low blood pressure like dizziness; feeling faint or lightheaded, falls; unusually weak or tired  · trouble passing urine or change in the amount of  urine  · yellowing of the eyes or skin  Side effects that usually do not require medical attention (report to your doctor or health care professional if they continue or are bothersome):  · constipation  · dry mouth  · nausea, vomiting  · tiredness  What may interact with this medicine?  This medicine may interact with the following medications:  · alcohol  · antiviral medicines for HIV or AIDS  · atropine  · antihistamines for allergy, cough and cold  · certain antibiotics like erythromycin, clarithromycin  · certain medicines for anxiety or sleep  · certain medicines for bladder problems like oxybutynin, tolterodine  · certain medicines for depression like amitriptyline, fluoxetine, sertraline  · certain medicines for fungal infections like ketoconazole and itraconazole  · certain medicines for Parkinson's disease like benztropine, trihexyphenidyl  · certain medicines for seizures like carbamazepine, phenobarbital, phenytoin, primidone  · certain medicines for stomach problems like dicyclomine, hyoscyamine  · certain medicines for travel sickness like scopolamine  · general anesthetics like halothane, isoflurane, methoxyflurane, propofol  · ipratropium  · local anesthetics like lidocaine, pramoxine, tetracaine  · MAOIs like Carbex, Eldepryl, Marplan, Nardil, and Parnate  · medicines that relax muscles for surgery  · other medicines with acetaminophen  · other narcotic medicines for pain or cough  · phenothiazines like chlorpromazine, mesoridazine, prochlorperazine, thioridazine  · rifampin  What if I miss a dose?  If you miss a dose, take it as soon as you can. If it is almost time for your next dose, take only that dose. Do not take double or extra doses.  Where should I keep my medicine?  Keep out of the reach of children. This medicine can be abused. Keep your medicine in a safe place to protect it from theft. Do not share this medicine with anyone. Selling or giving away this medicine is dangerous and against  the law.  This medicine may cause accidental overdose and death if it taken by other adults, children, or pets. Mix any unused medicine with a substance like cat litter or coffee grounds. Then throw the medicine away in a sealed container like a sealed bag or a coffee can with a lid. Do not use the medicine after the expiration date.  Store at room temperature between 15 and 30 degrees C (59 and 86 degrees F).  What should I tell my health care provider before I take this medicine?  They need to know if you have any of these conditions:  · brain tumor  · Crohn's disease, inflammatory bowel disease, or ulcerative colitis  · drug abuse or addiction  · head injury  · heart or circulation problems  · if you often drink alcohol  · kidney disease or problems going to the bathroom  · liver disease  · lung disease, asthma, or breathing problems  · an unusual or allergic reaction to acetaminophen, hydrocodone, other opioid analgesics, other medicines, foods, dyes, or preservatives  · pregnant or trying to get pregnant  · breast-feeding  What should I watch for while using this medicine?  Tell your doctor or health care professional if your pain does not go away, if it gets worse, or if you have new or a different type of pain. You may develop tolerance to the medicine. Tolerance means that you will need a higher dose of the medicine for pain relief. Tolerance is normal and is expected if you take the medicine for a long time.  Do not suddenly stop taking your medicine because you may develop a severe reaction. Your body becomes used to the medicine. This does NOT mean you are addicted. Addiction is a behavior related to getting and using a drug for a non-medical reason. If you have pain, you have a medical reason to take pain medicine. Your doctor will tell you how much medicine to take. If your doctor wants you to stop the medicine, the dose will be slowly lowered over time to avoid any side effects.  There are different  types of narcotic medicines (opiates). If you take more than one type at the same time or if you are taking another medicine that also causes drowsiness, you may have more side effects. Give your health care provider a list of all medicines you use. Your doctor will tell you how much medicine to take. Do not take more medicine than directed. Call emergency for help if you have problems breathing or unusual sleepiness.  Do not take other medicines that contain acetaminophen with this medicine. Always read labels carefully. If you have questions, ask your doctor or pharmacist.  If you take too much acetaminophen get medical help right away. Too much acetaminophen can be very dangerous and cause liver damage. Even if you do not have symptoms, it is important to get help right away.  You may get drowsy or dizzy. Do not drive, use machinery, or do anything that needs mental alertness until you know how this medicine affects you. Do not stand or sit up quickly, especially if you are an older patient. This reduces the risk of dizzy or fainting spells. Alcohol may interfere with the effect of this medicine. Avoid alcoholic drinks.  The medicine will cause constipation. Try to have a bowel movement at least every 2 to 3 days. If you do not have a bowel movement for 3 days, call your doctor or health care professional.  Your mouth may get dry. Chewing sugarless gum or sucking hard candy, and drinking plenty of water may help. Contact your doctor if the problem does not go away or is severe.  Date Last Reviewed:   NOTE:This sheet is a summary. It may not cover all possible information. If you have questions about this medicine, talk to your doctor, pharmacist, or health care provider. Copyright© 2016 [employer]

## 2017-02-17 NOTE — INTERVAL H&P NOTE
The patient has been examined and the H&P has been reviewed:    I concur with the findings and no changes have occurred since H&P was written.    Anesthesia/Surgery risks, benefits and alternative options discussed and understood by patient/family.          Active Hospital Problems    Diagnosis  POA    Osteomyelitis of toe of left foot [M86.9]  Yes      Resolved Hospital Problems    Diagnosis Date Resolved POA   No resolved problems to display.

## 2017-02-18 NOTE — OP NOTE
Patient: Raymond Stuart  : 1940  MR#: [id number]  DOS: 17  Surgeon: Dr. Nic Castillo D.P.M.  Assistant: None  Pre-Op Dx: Left Fifth Toe Osteomyelitis   Post Op Dx: Left Fifth Toe Osteomyelitis  Procedure: Left Fifth Toe Amputation  Anesthesia: IV sedation with local anesthesia  Hemostasis: Pneumatic ankle tourniquet @200mmHg  EBL: 30mL  Materials: 3-0 nylone  Injectables: Pre-op:10cc of 1:1 mixture of 1% Lidocaine plain and 0.5% Marcaine plain                     Post-Op: 5cc 0.5% Marcaine plain    Specimen: Necrotic soft tissue and Bone     Procedure in detail:  The patient was brought into the operating room and placed on operating table in the supine position.  A pneumatic ankle tourniquet was then place about the patients Left ankle. Following IV sedation, local anesthesia was obtained about the patients Left 5th ray utilizing 10cc of 1:1 mixture of 1% Lidocaine plain and 0.5% Marcaine plain. The Left foot was then scrubbed, prepped, and draped in the usual aseptic manner. The pneumatic ankle tourniquet was then inflated.    Attention was then directed to the Left fifth toe where a linear longitudinal incision over the fifth metatarsal continuing that incision to a racquet-shape incision around the proximal phalanx was made with blunt dissection down to bone. Next the proximal phalanx was identified and found to be soft and necrotic. The proximal phalanx was then disarticulate at the joint, removed intoto and passed from the operative field. The metatarsal head  was noted to be of good bone quality. Next utilizing a sagittal saw the cartilage and most distal aspect of the metatarsal head was removed and passed from operative field. The flexor and extensor tendons were both identified and pulled distally and cut as for proximally as allowed and passed from the operative field. The wound was then irrigated with sterile saline solution. Wound was then reinspected and found to be free of all necrotic  soft tissue and bone.  At this time, a pneumatic ankle tourniquet was then deflated and a prompt hyperemic response was noted ans all bleeder were cauterized.    Upon completion of the procedure a postoperative block consisting of 5cc of 0.5% Marcaine plain was injected about the incison site. Next the skin was reapproximated utilizing 3-0 nylon. The wound was then dressed with compressive dressing consisting of  betadine soaked adaptic, gauze, kaylin, and ACE.  The patient tolerated procedure and anesthesia well, patient was transferred to PACU with vital signs stable and neurovascular status intact to toes Left foot.

## 2017-02-21 RX ORDER — GABAPENTIN 600 MG/1
1200 TABLET ORAL 2 TIMES DAILY
Qty: 180 TABLET | Refills: 3 | Status: SHIPPED | OUTPATIENT
Start: 2017-02-21 | End: 2017-05-22 | Stop reason: SDUPTHER

## 2017-02-24 ENCOUNTER — OFFICE VISIT (OUTPATIENT)
Dept: PODIATRY | Facility: CLINIC | Age: 77
End: 2017-02-24
Payer: COMMERCIAL

## 2017-02-24 VITALS
WEIGHT: 242.5 LBS | HEART RATE: 101 BPM | HEIGHT: 66 IN | DIASTOLIC BLOOD PRESSURE: 82 MMHG | BODY MASS INDEX: 38.97 KG/M2 | SYSTOLIC BLOOD PRESSURE: 139 MMHG

## 2017-02-24 DIAGNOSIS — M86.9 OSTEOMYELITIS OF ANKLE OR FOOT: ICD-10-CM

## 2017-02-24 DIAGNOSIS — E11.51 TYPE II DIABETES MELLITUS WITH PERIPHERAL CIRCULATORY DISORDER: ICD-10-CM

## 2017-02-24 DIAGNOSIS — Z89.422 S/P AMPUTATION OF LESSER TOE, LEFT: Primary | ICD-10-CM

## 2017-02-24 PROCEDURE — 99999 PR PBB SHADOW E&M-EST. PATIENT-LVL III: CPT | Mod: PBBFAC,,, | Performed by: PODIATRIST

## 2017-02-24 PROCEDURE — 99024 POSTOP FOLLOW-UP VISIT: CPT | Mod: S$GLB,,, | Performed by: PODIATRIST

## 2017-02-27 LAB — INR PPP: 1.78

## 2017-02-28 ENCOUNTER — ANTI-COAG VISIT (OUTPATIENT)
Dept: CARDIOLOGY | Facility: CLINIC | Age: 77
End: 2017-02-28

## 2017-02-28 DIAGNOSIS — I48.20 CHRONIC ATRIAL FIBRILLATION: ICD-10-CM

## 2017-02-28 NOTE — PROGRESS NOTES
Verbal result taken from _Susy/Kami _______. PT/INR __20.7/1.78_____ Date drawn__2/27_____ patient recently held warfarin for procedure. Will re-challenge scheduled dose. Repeat INR in 2 weeks. Orders faxed.

## 2017-02-28 NOTE — PROGRESS NOTES
Subjective:     Patient ID: Raymond Stuart is a 77 y.o. male.    Chief Complaint: Post-op Evaluation (diabetic pt., left toe amputation, dressing change, pt. is accompanied by his daughter )    HPI: This 77 year old male returns to the clinic 1 weeks status post left toe amputation procedure. Patient has no complaints of fever chills or sweats. Negative pain. Patient states dressing was kept dry, clean, and intact. Patient states he is taking antibiotics as prescribed by his infectious disease doctor. Patient states his blood sugar this morning was 127mg/dl.     Patient Active Problem List   Diagnosis    Hyperlipidemia associated with type 2 diabetes mellitus    Essential hypertension    A-fib    PVD (peripheral vascular disease)    Special screening for malignant neoplasms, colon    S/P femoral-popliteal bypass surgery    S/P peripheral artery angioplasty    Type 2 diabetes mellitus with microalbuminuria    Type 2 diabetes mellitus with diabetic neuropathy    Osteomyelitis of toe of left foot       Medication List with Changes/Refills   Current Medications    ATENOLOL (TENORMIN) 100 MG TABLET    Take 1 tablet by mouth  daily    CLOPIDOGREL (PLAVIX) 75 MG TABLET    Take 1 tablet by mouth  daily    DIPHENHYDRAMINE (BENADRYL) 50 MG/ML INJECTION        DOXYCYCLINE (VIBRAMYCIN) 100 MG CAP        FENOFIBRATE MICRONIZED (LOFIBRA) 134 MG CAP    Take 1 capsule by mouth  once daily    GABAPENTIN (NEURONTIN) 600 MG TABLET    Take 2 tablets (1,200 mg total) by mouth 2 (two) times daily.    GLIPIZIDE-METFORMIN (METAGLIP) 2.5-500 MG PER TABLET    Take 2 tablets by mouth  twice a day before meals in the morning and evening    HYDROCODONE-ACETAMINOPHEN 7.5-325MG (NORCO) 7.5-325 MG PER TABLET    Take 1 tablet by mouth every 6 (six) hours as needed for Pain.    INVANZ 1 GRAM INJECTION        LISINOPRIL-HYDROCHLOROTHIAZIDE (PRINZIDE,ZESTORETIC) 20-12.5 MG PER TABLET    Take 2 tablets by mouth  once daily    MINOCYCLINE  "(MINOCIN,DYNACIN) 100 MG CAPSULE    Take 100 mg by mouth.     NIACIN 1,000 MG TBSR    Take 1 tablet by mouth Daily.    ROSUVASTATIN (CRESTOR) 40 MG TAB    Take 1 tablet (40 mg total) by mouth once daily.    SANTYL OINTMENT        VANCOMYCIN HCL IN DEXTROSE 5 % (VANCOMYCIN IN DEXTROSE 5 %) 1 GRAM/200 ML PGBK        WARFARIN (COUMADIN) 5 MG TABLET    TAKE 1 AND 1/2 TABLETS BY  MOUTH ON WEDNESDAY AND  SUNDAY AND 1 TABLET ALL  REMAINING DAY AS DIRECTED  BY THE COUMADIN CLINIC.       Review of patient's allergies indicates:   Allergen Reactions    Sulfa (sulfonamide antibiotics)      Other reaction(s): Stomach upset    Sulfamethoxazole Rash    Trimethoprim Rash     Bactrim         Past Surgical History:   Procedure Laterality Date    CARDIAC ELECTROPHYSIOLOGY MAPPING AND ABLATION      POPLITEAL ARTERY STENT  2013    RECTOPERITONEAL FISTULA CLOSURE      TOE SURGERY      Joint release    TONSILLECTOMY         History reviewed. No pertinent family history.    Social History     Social History    Marital status:      Spouse name: N/A    Number of children: N/A    Years of education: N/A     Occupational History    Retired [id number]     Social History Main Topics    Smoking status: Former Smoker     Packs/day: 4.00     Years: 30.00     Types: Cigarettes     Quit date: 1/9/1994    Smokeless tobacco: Never Used    Alcohol use No    Drug use: No    Sexual activity: Not on file     Other Topics Concern    Not on file     Social History Narrative       Vitals:    02/24/17 1423   BP: 139/82   Pulse: 101   Weight: 110 kg (242 lb 8.1 oz)   Height: 5' 6" (1.676 m)   PainSc: 0-No pain       Hemoglobin A1C   Date Value Ref Range Status   11/09/2016 6.9 (H) 4.5 - 6.2 % Final     Comment:     According to ADA guidelines, hemoglobin A1C <7.0% represents  optimal control in non-pregnant diabetic patients.  Different  metrics may apply to specific populations.   Standards of Medical Care in Diabetes - " 2016.  For the purpose of screening for the presence of diabetes:  <5.7%     Consistent with the absence of diabetes  5.7-6.4%  Consistent with increasing risk for diabetes   (prediabetes)  >or=6.5%  Consistent with diabetes  Currently no consensus exists for use of hemoglobin A1C  for diagnosis of diabetes for children.     08/10/2016 8.1 (H) 4.5 - 6.2 % Final     Comment:     According to ADA guidelines, hemoglobin A1C <7.0% represents  optimal control in non-pregnant diabetic patients.  Different  metrics may apply to specific populations.   Standards of Medical Care in Diabetes - 2016.  For the purpose of screening for the presence of diabetes:  <5.7%     Consistent with the absence of diabetes  5.7-6.4%  Consistent with increasing risk for diabetes   (prediabetes)  >or=6.5%  Consistent with diabetes  Currently no consensus exists for use of hemoglobin A1C  for diagnosis of diabetes for children.     05/09/2016 8.0 (H) 4.5 - 6.2 % Final       Review of Systems   Constitutional: Negative for chills and fever.   Respiratory: Negative for shortness of breath.    Cardiovascular: Positive for leg swelling. Negative for chest pain, palpitations, orthopnea and claudication.   Gastrointestinal: Negative for diarrhea, nausea and vomiting.   Musculoskeletal: Negative for joint pain.   Skin: Negative for rash.   Neurological: Positive for tingling and sensory change. Negative for dizziness, focal weakness and weakness.   Endo/Heme/Allergies: Bruises/bleeds easily.   Psychiatric/Behavioral: Negative.          Objective:        Physical examination: General: Patient is in no acute distress, alert and oriented x 3.  Dressing to left foot clean, dry, and intact.   Lower Extremity Exam:  Vascular: Dorsalis pedis and Posterior tibial pulses palpable on left foot.  Capillary fill time <4 sec to toes on left foot. Minimal edema noted on left foot.   Dermatologic: Sutures Intact. Incision site well copated on left foot. Negative  erythema, drainage, or increased temp noted to surgical site. Previous ulcer noted to left plantar 5th submetatarsal is closed with epithealized tissue noted.   Neurological: Light touch sensation intact to left foot.   Musculoskeletal: Negative pain on palpation/ROM of left foot.         Assessment:       Encounter Diagnoses   Name Primary?    S/P amputation of lesser toe, left Yes    Osteomyelitis of ankle or foot     Type II diabetes mellitus with peripheral circulatory disorder          Plan:   S/P amputation of lesser toe, left    Osteomyelitis of ankle or foot    Type II diabetes mellitus with peripheral circulatory disorder    I counseled the patient on his conditions, their implications and medical management.  left foot dressed with betadine soaked adaptic, gauze, kaylin, kerlix, and ACE  Patient instructed to keep dressing dry, clean and intact.   Patient instructed to continue to ambulate in surgical shoe  Patient should call the clinic immediately if any signs of infection such as fever chills sweats increased redness or pain.  Patient to return in 1 week or sooner if needed.                           Nic Castillo DPM  [facility]

## 2017-03-03 ENCOUNTER — OFFICE VISIT (OUTPATIENT)
Dept: PODIATRY | Facility: CLINIC | Age: 77
End: 2017-03-03
Payer: COMMERCIAL

## 2017-03-03 VITALS
HEIGHT: 66 IN | SYSTOLIC BLOOD PRESSURE: 139 MMHG | DIASTOLIC BLOOD PRESSURE: 78 MMHG | BODY MASS INDEX: 38.69 KG/M2 | HEART RATE: 97 BPM | WEIGHT: 240.75 LBS

## 2017-03-03 DIAGNOSIS — Z89.422 S/P AMPUTATION OF LESSER TOE, LEFT: Primary | ICD-10-CM

## 2017-03-03 DIAGNOSIS — E11.51 TYPE II DIABETES MELLITUS WITH PERIPHERAL CIRCULATORY DISORDER: ICD-10-CM

## 2017-03-03 DIAGNOSIS — M86.9 OSTEOMYELITIS OF ANKLE OR FOOT: ICD-10-CM

## 2017-03-03 PROCEDURE — 99024 POSTOP FOLLOW-UP VISIT: CPT | Mod: S$GLB,,, | Performed by: PODIATRIST

## 2017-03-03 PROCEDURE — 99999 PR PBB SHADOW E&M-EST. PATIENT-LVL II: CPT | Mod: PBBFAC,,, | Performed by: PODIATRIST

## 2017-03-07 ENCOUNTER — TELEPHONE (OUTPATIENT)
Dept: PODIATRY | Facility: CLINIC | Age: 77
End: 2017-03-07

## 2017-03-07 NOTE — TELEPHONE ENCOUNTER
Contacted 's call regarding her father () post surgical site having a scar near the sutures. After speaking with  I informed  that we have scheduled  an appointment on Friday, 03/10/17 at the [facility] at 3:30PM.  stated understanding and the call ended well.

## 2017-03-07 NOTE — TELEPHONE ENCOUNTER
----- Message from Lisbeth Sotomayor sent at 3/7/2017 10:14 AM CST -----  Contact: Stephenie Lelo- Daughter  Please call pt rg pt's addie, Pt's daughter states that there was a small scar near the stitch area. Please call daughter back @. [phone]

## 2017-03-10 ENCOUNTER — OFFICE VISIT (OUTPATIENT)
Dept: PODIATRY | Facility: CLINIC | Age: 77
End: 2017-03-10
Payer: COMMERCIAL

## 2017-03-10 VITALS
HEART RATE: 90 BPM | BODY MASS INDEX: 39.04 KG/M2 | SYSTOLIC BLOOD PRESSURE: 113 MMHG | WEIGHT: 242.94 LBS | DIASTOLIC BLOOD PRESSURE: 63 MMHG | HEIGHT: 66 IN

## 2017-03-10 DIAGNOSIS — E11.51 TYPE II DIABETES MELLITUS WITH PERIPHERAL CIRCULATORY DISORDER: ICD-10-CM

## 2017-03-10 DIAGNOSIS — M86.9 OSTEOMYELITIS OF ANKLE OR FOOT: ICD-10-CM

## 2017-03-10 DIAGNOSIS — Z89.422 S/P AMPUTATION OF LESSER TOE, LEFT: Primary | ICD-10-CM

## 2017-03-10 PROCEDURE — 99024 POSTOP FOLLOW-UP VISIT: CPT | Mod: S$GLB,,, | Performed by: PODIATRIST

## 2017-03-10 PROCEDURE — 99999 PR PBB SHADOW E&M-EST. PATIENT-LVL III: CPT | Mod: PBBFAC,,, | Performed by: PODIATRIST

## 2017-03-10 NOTE — MR AVS SNAPSHOT
[facility]  [address] [place] [address]  Phone: [phone]  Fax: [phone]                  Raymond Stuart   3/10/2017 3:20 PM   Office Visit    Description:  Male : 1940   Provider:  Nic Castillo DPM   Department:  [facility]           Reason for Visit     Foot Check                To Do List           Future Appointments        Provider Department Dept Phone    3/24/2017 2:30 PM [facility] XR2 [facility] [phone]    3/24/2017 3:00 PM Nic Castillo DPM [facility] [phone]    2017 3:15 PM Adele Gomez MD [facility] Dermatology [phone]    2017 7:35 AM LABORATORY, [facility] [phone]    2017 7:40 AM SPECIMEN, [facility] [phone]      Goals (5 Years of Data)     None      [facility]     [facility] -  Assistance  Registered nurses in the [facility] provide clinical advisement, health education, appointment booking, and other advisory services.  Call for this free service at [phone].             Medications           Message regarding Medications     Verify the changes and/or additions to your medication regime listed below are the same as discussed with your clinician today.  If any of these changes or additions are incorrect, please notify your healthcare provider.             Verify that the below list of medications is an accurate representation of the medications you are currently taking.  If none reported, the list may be blank. If incorrect, please contact your healthcare provider. Carry this list with you in case of emergency.           Current Medications     atenolol (TENORMIN) 100 MG tablet Take 1 tablet by mouth  daily    clopidogrel (PLAVIX) 75 mg tablet Take 1 tablet by mouth  daily    diphenhydrAMINE (BENADRYL) 50 mg/mL injection     doxycycline (VIBRAMYCIN) 100 MG Cap     fenofibrate micronized (LOFIBRA)  "134 MG Cap Take 1 capsule by mouth  once daily    gabapentin (NEURONTIN) 600 MG tablet Take 2 tablets (1,200 mg total) by mouth 2 (two) times daily.    glipizide-metformin (METAGLIP) 2.5-500 mg per tablet Take 2 tablets by mouth  twice a day before meals in the morning and evening    hydrocodone-acetaminophen 7.5-325mg (NORCO) 7.5-325 mg per tablet Take 1 tablet by mouth every 6 (six) hours as needed for Pain.    INVANZ 1 gram injection     lisinopril-hydrochlorothiazide (PRINZIDE,ZESTORETIC) 20-12.5 mg per tablet Take 2 tablets by mouth  once daily    minocycline (MINOCIN,DYNACIN) 100 MG capsule Take 100 mg by mouth.     niacin 1,000 mg TbSR Take 1 tablet by mouth Daily.    rosuvastatin (CRESTOR) 40 MG Tab Take 1 tablet (40 mg total) by mouth once daily.    SANTYL ointment     VANCOMYCIN HCL IN DEXTROSE 5 % (VANCOMYCIN IN DEXTROSE 5 %) 1 gram/200 mL PgBk     warfarin (COUMADIN) 5 MG tablet TAKE 1 AND 1/2 TABLETS BY  MOUTH ON WEDNESDAY AND  SUNDAY AND 1 TABLET ALL  REMAINING DAY AS DIRECTED  BY THE COUMADIN CLINIC.           Clinical Reference Information           Your Vitals Were     BP Pulse Height Weight BMI    113/63 (BP Location: Right arm, Patient Position: Sitting, BP Method: Automatic) 90 5' 6" (1.676 m) 110.2 kg (242 lb 15.2 oz) 39.21 kg/m2      Blood Pressure          Most Recent Value    BP  113/63      Allergies as of 3/10/2017     Sulfa (Sulfonamide Antibiotics)    Sulfamethoxazole    Trimethoprim      Immunizations Administered on Date of Encounter - 3/10/2017     None      [facility] Sign-Up     Activating your [facility] account is as easy as 1-2-3!     1) Visit [email or web address], select Sign Up Now, enter this activation code and your date of birth, then select Next.  [email or web address]  Expires: 4/24/2017  4:01 PM      2) Create a username and password to use when you visit [facility] in the future and select a security question in case you lose your password and select Next.    3) Enter your e-mail " address and click Sign Up!    Additional Information  If you have questions, please e-mail [email or web address] or call [phone] to talk to our [facility] staff. Remember, [facility] is NOT to be used for urgent needs. For medical emergencies, dial 911.         Language Assistance Services     ATTENTION: Language assistance services are available, free of charge. Please call [phone].      ATENCIÓN: Si habla lynn, tiene a villagran disposición servicios gratuitos de asistencia lingüística. Llame al [phone].     CHÚ Ý: N?u b?n nói Ti?ng Vi?t, có các d?ch v? h? tr? ngôn ng? mi?n phí dành cho b?n. G?i s? [phone].         [facility] complies with applicable Federal civil rights laws and does not discriminate on the basis of race, color, national origin, age, disability, or sex.

## 2017-03-10 NOTE — PROGRESS NOTES
Subjective:     Patient ID: Raymond Stuart is a 77 y.o. male.    Chief Complaint: Foot Check (diabetic pt., pt. is accompanied by his daughter, pt. denied current pain with his left foot )    HPI: This 77 year old male returns to the clinic 3 weeks status post left toe amputation procedure. Patient has no complaints of fever chills or sweats. Negative pain. Patient states dressing was kept dry, clean, and intact.       Patient Active Problem List   Diagnosis    Hyperlipidemia associated with type 2 diabetes mellitus    Essential hypertension    A-fib    PVD (peripheral vascular disease)    Special screening for malignant neoplasms, colon    S/P femoral-popliteal bypass surgery    S/P peripheral artery angioplasty    Type 2 diabetes mellitus with microalbuminuria    Type 2 diabetes mellitus with diabetic neuropathy    Osteomyelitis of toe of left foot       Medication List with Changes/Refills   Current Medications    ATENOLOL (TENORMIN) 100 MG TABLET    Take 1 tablet by mouth  daily    CLOPIDOGREL (PLAVIX) 75 MG TABLET    Take 1 tablet by mouth  daily    DIPHENHYDRAMINE (BENADRYL) 50 MG/ML INJECTION        DOXYCYCLINE (VIBRAMYCIN) 100 MG CAP        FENOFIBRATE MICRONIZED (LOFIBRA) 134 MG CAP    Take 1 capsule by mouth  once daily    GABAPENTIN (NEURONTIN) 600 MG TABLET    Take 2 tablets (1,200 mg total) by mouth 2 (two) times daily.    GLIPIZIDE-METFORMIN (METAGLIP) 2.5-500 MG PER TABLET    Take 2 tablets by mouth  twice a day before meals in the morning and evening    HYDROCODONE-ACETAMINOPHEN 7.5-325MG (NORCO) 7.5-325 MG PER TABLET    Take 1 tablet by mouth every 6 (six) hours as needed for Pain.    INVANZ 1 GRAM INJECTION        LISINOPRIL-HYDROCHLOROTHIAZIDE (PRINZIDE,ZESTORETIC) 20-12.5 MG PER TABLET    Take 2 tablets by mouth  once daily    MINOCYCLINE (MINOCIN,DYNACIN) 100 MG CAPSULE    Take 100 mg by mouth.     NIACIN 1,000 MG TBSR    Take 1 tablet by mouth Daily.    ROSUVASTATIN (CRESTOR) 40 MG TAB  "   Take 1 tablet (40 mg total) by mouth once daily.    SANTYL OINTMENT        VANCOMYCIN HCL IN DEXTROSE 5 % (VANCOMYCIN IN DEXTROSE 5 %) 1 GRAM/200 ML PGBK        WARFARIN (COUMADIN) 5 MG TABLET    TAKE 1 AND 1/2 TABLETS BY  MOUTH ON WEDNESDAY AND  SUNDAY AND 1 TABLET ALL  REMAINING DAY AS DIRECTED  BY THE COUMADIN CLINIC.       Review of patient's allergies indicates:   Allergen Reactions    Sulfa (sulfonamide antibiotics)      Other reaction(s): Stomach upset    Sulfamethoxazole Rash    Trimethoprim Rash     Bactrim         Past Surgical History:   Procedure Laterality Date    CARDIAC ELECTROPHYSIOLOGY MAPPING AND ABLATION      POPLITEAL ARTERY STENT  2013    RECTOPERITONEAL FISTULA CLOSURE      TOE SURGERY      Joint release    TONSILLECTOMY         History reviewed. No pertinent family history.    Social History     Social History    Marital status:      Spouse name: N/A    Number of children: N/A    Years of education: N/A     Occupational History    Retired [employer] 5[employer]     Social History Main Topics    Smoking status: Former Smoker     Packs/day: 4.00     Years: 30.00     Types: Cigarettes     Quit date: 1/9/1994    Smokeless tobacco: Never Used    Alcohol use No    Drug use: No    Sexual activity: Not on file     Other Topics Concern    Not on file     Social History Narrative       Vitals:    03/10/17 1535   BP: 113/63   Pulse: 90   Weight: 110.2 kg (242 lb 15.2 oz)   Height: 5' 6" (1.676 m)   PainSc: 0-No pain       Hemoglobin A1C   Date Value Ref Range Status   11/09/2016 6.9 (H) 4.5 - 6.2 % Final     Comment:     According to ADA guidelines, hemoglobin A1C <7.0% represents  optimal control in non-pregnant diabetic patients.  Different  metrics may apply to specific populations.   Standards of Medical Care in Diabetes - 2016.  For the purpose of screening for the presence of diabetes:  <5.7%     Consistent with the absence of diabetes  5.7-6.4%  Consistent with " increasing risk for diabetes   (prediabetes)  >or=6.5%  Consistent with diabetes  Currently no consensus exists for use of hemoglobin A1C  for diagnosis of diabetes for children.     08/10/2016 8.1 (H) 4.5 - 6.2 % Final     Comment:     According to ADA guidelines, hemoglobin A1C <7.0% represents  optimal control in non-pregnant diabetic patients.  Different  metrics may apply to specific populations.   Standards of Medical Care in Diabetes - 2016.  For the purpose of screening for the presence of diabetes:  <5.7%     Consistent with the absence of diabetes  5.7-6.4%  Consistent with increasing risk for diabetes   (prediabetes)  >or=6.5%  Consistent with diabetes  Currently no consensus exists for use of hemoglobin A1C  for diagnosis of diabetes for children.     05/09/2016 8.0 (H) 4.5 - 6.2 % Final       Review of Systems   Constitutional: Negative for chills and fever.   Respiratory: Negative for shortness of breath.    Cardiovascular: Positive for leg swelling. Negative for chest pain, palpitations, orthopnea and claudication.   Gastrointestinal: Negative for diarrhea, nausea and vomiting.   Musculoskeletal: Negative for joint pain.   Skin: Negative for rash.   Neurological: Positive for tingling and sensory change. Negative for dizziness, focal weakness and weakness.   Endo/Heme/Allergies: Bruises/bleeds easily.   Psychiatric/Behavioral: Negative.          Objective:      Physical examination: General: Patient is in no acute distress, alert and oriented x 3.  Dressing to left foot clean, dry, and intact.   Lower Extremity Exam:  Vascular: Dorsalis pedis and Posterior tibial pulses palpable on left foot.  Capillary fill time <4 sec to toes on left foot. Minimal edema noted on left foot.   Dermatologic: Incision site well copated on left foot. Negative erythema, drainage, or increased temp noted to surgical site. Previous ulcer noted to left plantar 5th submetatarsal is closed with epithealized tissue noted.    Neurological: Light touch sensation intact to left foot.   Musculoskeletal: Negative pain on palpation/ROM of left foot.         Assessment:       Encounter Diagnoses   Name Primary?    S/P amputation of lesser toe, left Yes    Osteomyelitis of ankle or foot     Type II diabetes mellitus with peripheral circulatory disorder          Plan:   S/P amputation of lesser toe, left    Osteomyelitis of ankle or foot    Type II diabetes mellitus with peripheral circulatory disorder    I counseled the patient on his conditions, their implications and medical management.  Left foot dressed with betadine soaked adaptic, gauze, kaylin, kerlix, and ACE  Patient instructed to keep dressing dry, clean and intact.   Patient instructed to continue to ambulate in surgical shoe  Patient should call the clinic immediately if any signs of infection such as fever chills sweats increased redness or pain.  Patient to return in 2 weeks or sooner if needed.                 Nic Castillo DPM  [facility]

## 2017-03-12 NOTE — PROGRESS NOTES
Subjective:     Patient ID: Raymond Stuart is a 77 y.o. male.    Chief Complaint: Post-op Evaluation (left 5th toe amputation, pt. denied current pain, pt. is accompanied by his daughter )    HPI: This 77year old male returns to the clinic 2 weeks status post left toe procedure. Patient has no complaints of fever chills or sweats. Negative pain. Patient states dressing was kept dry, clean, and intact.       Patient Active Problem List   Diagnosis    Hyperlipidemia associated with type 2 diabetes mellitus    Essential hypertension    A-fib    PVD (peripheral vascular disease)    Special screening for malignant neoplasms, colon    S/P femoral-popliteal bypass surgery    S/P peripheral artery angioplasty    Type 2 diabetes mellitus with microalbuminuria    Type 2 diabetes mellitus with diabetic neuropathy    Osteomyelitis of toe of left foot       Medication List with Changes/Refills   Current Medications    ATENOLOL (TENORMIN) 100 MG TABLET    Take 1 tablet by mouth  daily    CLOPIDOGREL (PLAVIX) 75 MG TABLET    Take 1 tablet by mouth  daily    DIPHENHYDRAMINE (BENADRYL) 50 MG/ML INJECTION        DOXYCYCLINE (VIBRAMYCIN) 100 MG CAP        FENOFIBRATE MICRONIZED (LOFIBRA) 134 MG CAP    Take 1 capsule by mouth  once daily    GABAPENTIN (NEURONTIN) 600 MG TABLET    Take 2 tablets (1,200 mg total) by mouth 2 (two) times daily.    GLIPIZIDE-METFORMIN (METAGLIP) 2.5-500 MG PER TABLET    Take 2 tablets by mouth  twice a day before meals in the morning and evening    HYDROCODONE-ACETAMINOPHEN 7.5-325MG (NORCO) 7.5-325 MG PER TABLET    Take 1 tablet by mouth every 6 (six) hours as needed for Pain.    INVANZ 1 GRAM INJECTION        LISINOPRIL-HYDROCHLOROTHIAZIDE (PRINZIDE,ZESTORETIC) 20-12.5 MG PER TABLET    Take 2 tablets by mouth  once daily    MINOCYCLINE (MINOCIN,DYNACIN) 100 MG CAPSULE    Take 100 mg by mouth.     NIACIN 1,000 MG TBSR    Take 1 tablet by mouth Daily.    ROSUVASTATIN (CRESTOR) 40 MG TAB    Take 1  "tablet (40 mg total) by mouth once daily.    SANTYL OINTMENT        VANCOMYCIN HCL IN DEXTROSE 5 % (VANCOMYCIN IN DEXTROSE 5 %) 1 GRAM/200 ML PGBK        WARFARIN (COUMADIN) 5 MG TABLET    TAKE 1 AND 1/2 TABLETS BY  MOUTH ON WEDNESDAY AND  SUNDAY AND 1 TABLET ALL  REMAINING DAY AS DIRECTED  BY THE COUMADIN CLINIC.       Review of patient's allergies indicates:   Allergen Reactions    Sulfa (sulfonamide antibiotics)      Other reaction(s): Stomach upset    Sulfamethoxazole Rash    Trimethoprim Rash     Bactrim         Past Surgical History:   Procedure Laterality Date    CARDIAC ELECTROPHYSIOLOGY MAPPING AND ABLATION      POPLITEAL ARTERY STENT  2013    RECTOPERITONEAL FISTULA CLOSURE      TOE SURGERY      Joint release    TONSILLECTOMY         No family history on file.    Social History     Social History    Marital status:      Spouse name: N/A    Number of children: N/A    Years of education: N/A     Occupational History    Retired [employer]     Social History Main Topics    Smoking status: Former Smoker     Packs/day: 4.00     Years: 30.00     Types: Cigarettes     Quit date: 1/9/1994    Smokeless tobacco: Never Used    Alcohol use No    Drug use: No    Sexual activity: Not on file     Other Topics Concern    Not on file     Social History Narrative       Vitals:    03/03/17 1531   BP: 139/78   Pulse: 97   Weight: 109.2 kg (240 lb 11.9 oz)   Height: 5' 6" (1.676 m)   PainSc: 0-No pain       Hemoglobin A1C   Date Value Ref Range Status   11/09/2016 6.9 (H) 4.5 - 6.2 % Final     Comment:     According to ADA guidelines, hemoglobin A1C <7.0% represents  optimal control in non-pregnant diabetic patients.  Different  metrics may apply to specific populations.   Standards of Medical Care in Diabetes - 2016.  For the purpose of screening for the presence of diabetes:  <5.7%     Consistent with the absence of diabetes  5.7-6.4%  Consistent with increasing risk for diabetes "   (prediabetes)  >or=6.5%  Consistent with diabetes  Currently no consensus exists for use of hemoglobin A1C  for diagnosis of diabetes for children.     08/10/2016 8.1 (H) 4.5 - 6.2 % Final     Comment:     According to ADA guidelines, hemoglobin A1C <7.0% represents  optimal control in non-pregnant diabetic patients.  Different  metrics may apply to specific populations.   Standards of Medical Care in Diabetes - 2016.  For the purpose of screening for the presence of diabetes:  <5.7%     Consistent with the absence of diabetes  5.7-6.4%  Consistent with increasing risk for diabetes   (prediabetes)  >or=6.5%  Consistent with diabetes  Currently no consensus exists for use of hemoglobin A1C  for diagnosis of diabetes for children.     05/09/2016 8.0 (H) 4.5 - 6.2 % Final       ROS          Objective:      Physical examination: General: Patient is in no acute distress, alert and oriented x 3.  Dressing to left foot clean, dry, and intact.   Lower Extremity Exam:  Vascular: Dorsalis pedis and Posterior tibial pulses palpable on left foot.  Capillary fill time <4 sec to toes on left foot. Minimal edema noted on left foot.   Dermatologic: Sutures Intact. Incision site well copated on left foot. Negative erythema, drainage, or increased temp noted to surgical site. Previous ulcer noted to left plantar 5th submetatarsal is closed with epithealized tissue noted.   Neurological: Light touch sensation intact to left foot.   Musculoskeletal: Negative pain on palpation/ROM of left foot.       Assessment:       Encounter Diagnoses   Name Primary?    S/P amputation of lesser toe, left Yes    Osteomyelitis of ankle or foot     Type II diabetes mellitus with peripheral circulatory disorder          Plan:   S/P amputation of lesser toe, left    Osteomyelitis of ankle or foot    Type II diabetes mellitus with peripheral circulatory disorder    I counseled the patient on his conditions, their implications and medical  management.  All sutures removed.   Left foot dressed with betadine soaked adaptic, gauze, kaylin, kerlix, and ACE  Patient instructed to keep dressing dry, clean and intact.   Patient instructed to continue to ambulate in surgical shoe  Patient should call the clinic immediately if any signs of infection such as fever chills sweats increased redness or pain.  Patient to return in 1 week or sooner if needed.                   Nic Castillo DPM  [facility]

## 2017-03-13 ENCOUNTER — ANTI-COAG VISIT (OUTPATIENT)
Dept: CARDIOLOGY | Facility: CLINIC | Age: 77
End: 2017-03-13

## 2017-03-13 LAB — INR PPP: 2.1

## 2017-03-13 NOTE — PROGRESS NOTES
Verbal result taken from __Susy/Kami _______. PT/INR __25.0/2.1____ Date drawn___3/13_____ Continue dose, repeat INR in 3 weeks.

## 2017-03-24 ENCOUNTER — HOSPITAL ENCOUNTER (OUTPATIENT)
Dept: RADIOLOGY | Facility: HOSPITAL | Age: 77
Discharge: HOME | End: 2017-03-24
Attending: PODIATRIST
Payer: COMMERCIAL

## 2017-03-24 ENCOUNTER — OFFICE VISIT (OUTPATIENT)
Dept: PODIATRY | Facility: CLINIC | Age: 77
End: 2017-03-24
Payer: COMMERCIAL

## 2017-03-24 VITALS
HEIGHT: 66 IN | WEIGHT: 240.31 LBS | HEART RATE: 94 BPM | SYSTOLIC BLOOD PRESSURE: 136 MMHG | BODY MASS INDEX: 38.62 KG/M2 | DIASTOLIC BLOOD PRESSURE: 72 MMHG

## 2017-03-24 DIAGNOSIS — E11.51 TYPE II DIABETES MELLITUS WITH PERIPHERAL CIRCULATORY DISORDER: ICD-10-CM

## 2017-03-24 DIAGNOSIS — I73.9 PVD (PERIPHERAL VASCULAR DISEASE): ICD-10-CM

## 2017-03-24 DIAGNOSIS — Z89.422 S/P AMPUTATION OF LESSER TOE, LEFT: Primary | ICD-10-CM

## 2017-03-24 DIAGNOSIS — Z98.890 POST-OPERATIVE STATE: ICD-10-CM

## 2017-03-24 DIAGNOSIS — L84 PRE-ULCERATIVE CALLUSES: ICD-10-CM

## 2017-03-24 PROCEDURE — 99999 PR PBB SHADOW E&M-EST. PATIENT-LVL III: CPT | Mod: PBBFAC,,, | Performed by: PODIATRIST

## 2017-03-24 PROCEDURE — 99024 POSTOP FOLLOW-UP VISIT: CPT | Mod: S$GLB,,, | Performed by: PODIATRIST

## 2017-03-24 PROCEDURE — 73630 X-RAY EXAM OF FOOT: CPT | Mod: TC,PO,LT

## 2017-03-24 PROCEDURE — 73630 X-RAY EXAM OF FOOT: CPT | Mod: 26,LT,, | Performed by: RADIOLOGY

## 2017-03-24 NOTE — PATIENT INSTRUCTIONS
[facility]    [address] [place] [address]  Phone: [phone]  Fax: [phone]

## 2017-03-24 NOTE — MR AVS SNAPSHOT
[facility]  [address] [place] [address]  Phone: [phone]  Fax: [phone]                  Raymond Stuart   3/24/2017 3:00 PM   Office Visit    Description:  Male : 1940   Provider:  Nic Castillo DPM   Department:  [facility] - [facility]           Reason for Visit     Post-op Evaluation     toe check           Diagnoses this Visit        Comments    Type II diabetes mellitus with peripheral circulatory disorder    -  Primary     S/P amputation of lesser toe, left         Pre-ulcerative calluses         PVD (peripheral vascular disease)                To Do List           Future Appointments        Provider Department Dept Phone    3/24/2017 4:00 PM LAB, SAME DAY [facility] [phone]    2017 3:15 PM Adele Gomez MD [facility] [phone]    2017 4:20 PM Nic Castillo DPM [facility] [phone]    2017 7:35 AM LABORATORY, [facility] [phone]    2017 7:40 AM SPECIMEN, [facility] [phone]      Goals (5 Years of Data)     None      [facility]     [facility] Care [facility] -  Assistance  Registered nurses in the [facility] provide clinical advisement, health education, appointment booking, and other advisory services.  Call for this free service at [phone].             Medications           Message regarding Medications     Verify the changes and/or additions to your medication regime listed below are the same as discussed with your clinician today.  If any of these changes or additions are incorrect, please notify your healthcare provider.             Verify that the below list of medications is an accurate representation of the medications you are currently taking.  If none reported, the list may be blank. If incorrect, please contact your healthcare provider. Carry this list with you in case of emergency.           Current  "Medications     atenolol (TENORMIN) 100 MG tablet Take 1 tablet by mouth  daily    clopidogrel (PLAVIX) 75 mg tablet Take 1 tablet by mouth  daily    diphenhydrAMINE (BENADRYL) 50 mg/mL injection     doxycycline (VIBRAMYCIN) 100 MG Cap     fenofibrate micronized (LOFIBRA) 134 MG Cap Take 1 capsule by mouth  once daily    gabapentin (NEURONTIN) 600 MG tablet Take 2 tablets (1,200 mg total) by mouth 2 (two) times daily.    glipizide-metformin (METAGLIP) 2.5-500 mg per tablet Take 2 tablets by mouth  twice a day before meals in the morning and evening    hydrocodone-acetaminophen 7.5-325mg (NORCO) 7.5-325 mg per tablet Take 1 tablet by mouth every 6 (six) hours as needed for Pain.    INVANZ 1 gram injection     lisinopril-hydrochlorothiazide (PRINZIDE,ZESTORETIC) 20-12.5 mg per tablet Take 2 tablets by mouth  once daily    minocycline (MINOCIN,DYNACIN) 100 MG capsule Take 100 mg by mouth.     niacin 1,000 mg TbSR Take 1 tablet by mouth Daily.    rosuvastatin (CRESTOR) 40 MG Tab Take 1 tablet (40 mg total) by mouth once daily.    SANTYL ointment     VANCOMYCIN HCL IN DEXTROSE 5 % (VANCOMYCIN IN DEXTROSE 5 %) 1 gram/200 mL PgBk     warfarin (COUMADIN) 5 MG tablet TAKE 1 AND 1/2 TABLETS BY  MOUTH ON WEDNESDAY AND  SUNDAY AND 1 TABLET ALL  REMAINING DAY AS DIRECTED  BY THE COUMADIN CLINIC.           Clinical Reference Information           Your Vitals Were     BP Pulse Height Weight BMI    136/72 (BP Location: Right arm, Patient Position: Sitting, BP Method: Automatic) 94 5' 6" (1.676 m) 109 kg (240 lb 4.8 oz) 38.79 kg/m2      Blood Pressure          Most Recent Value    BP  136/72      Allergies as of 3/24/2017     Sulfa (Sulfonamide Antibiotics)    Sulfamethoxazole    Trimethoprim      Immunizations Administered on Date of Encounter - 3/24/2017     None      Orders Placed During Today's Visit      Normal Orders This Visit    DIABETIC SHOES FOR HOME USE     Future Labs/Procedures Expected by Expires    C-reactive protein  " 3/24/2017 5/23/2018    CBC auto differential  3/24/2017 5/23/2018    Sedimentation rate, manual  3/24/2017 5/23/2018      [facility] Sign-Up     Activating your [facility] account is as easy as 1-2-3!     1) Visit [email or web address] select Sign Up Now, enter this activation code and your date of birth, then select Next.  [email or web address]  Expires: 4/24/2017  5:01 PM      2) Create a username and password to use when you visit [facility] in the future and select a security question in case you lose your password and select Next.    3) Enter your e-mail address and click Sign Up!    Additional Information  If you have questions, please e-mail [email or web address] [facility]) or call [phone] to talk to our [facility] staff. Remember, [facility] is NOT to be used for urgent needs. For medical emergencies, dial 911.         Instructions    [facility]    [address] [place] [address]  Phone: [phone]  Fax: [phone]         Language Assistance Services     ATTENTION: Language assistance services are available, free of charge. Please call [phone].      ATENCIÓN: Si habla español, tiene a villagran disposición servicios gratuitos de asistencia lingüística. Llame al [phone].     CHÚ Ý: N?u b?n nói Ti?ng Vi?t, có các d?ch v? h? tr? ngôn ng? mi?n phí dành cho b?n. G?i s? [phone].         [facility] complies with applicable Federal civil rights laws and does not discriminate on the basis of race, color, national origin, age, disability, or sex.

## 2017-03-24 NOTE — PROGRESS NOTES
Subjective:     Patient ID: Raymond Sutart is a 77 y.o. male.    Chief Complaint: Post-op Evaluation (diabetic pt., left 5th toe , pt. is accompanied by his daughter) and toe check (left 2nd toe )    HPI: This 77 year old male returns to the clinic 5 weeks status post left toe amputation procedure. Patient has no complaints of fever chills or sweats. Negative pain. Patient states he saw his infection disease doctor last week and he wants him to continue oral antibiotics for at least another 2 months. Patient states his blood sugar this morning was 123mg/dl.    Patient Active Problem List   Diagnosis    Hyperlipidemia associated with type 2 diabetes mellitus    Essential hypertension    A-fib    PVD (peripheral vascular disease)    Special screening for malignant neoplasms, colon    S/P femoral-popliteal bypass surgery    S/P peripheral artery angioplasty    Type 2 diabetes mellitus with microalbuminuria    Type 2 diabetes mellitus with diabetic neuropathy    Osteomyelitis of toe of left foot       Medication List with Changes/Refills   Current Medications    ATENOLOL (TENORMIN) 100 MG TABLET    Take 1 tablet by mouth  daily    CLOPIDOGREL (PLAVIX) 75 MG TABLET    Take 1 tablet by mouth  daily    DIPHENHYDRAMINE (BENADRYL) 50 MG/ML INJECTION        DOXYCYCLINE (VIBRAMYCIN) 100 MG CAP        FENOFIBRATE MICRONIZED (LOFIBRA) 134 MG CAP    Take 1 capsule by mouth  once daily    GABAPENTIN (NEURONTIN) 600 MG TABLET    Take 2 tablets (1,200 mg total) by mouth 2 (two) times daily.    GLIPIZIDE-METFORMIN (METAGLIP) 2.5-500 MG PER TABLET    Take 2 tablets by mouth  twice a day before meals in the morning and evening    HYDROCODONE-ACETAMINOPHEN 7.5-325MG (NORCO) 7.5-325 MG PER TABLET    Take 1 tablet by mouth every 6 (six) hours as needed for Pain.    INVANZ 1 GRAM INJECTION        LISINOPRIL-HYDROCHLOROTHIAZIDE (PRINZIDE,ZESTORETIC) 20-12.5 MG PER TABLET    Take 2 tablets by mouth  once daily    MINOCYCLINE  "(MINOCIN,DYNACIN) 100 MG CAPSULE    Take 100 mg by mouth.     NIACIN 1,000 MG TBSR    Take 1 tablet by mouth Daily.    ROSUVASTATIN (CRESTOR) 40 MG TAB    Take 1 tablet (40 mg total) by mouth once daily.    SANTYL OINTMENT        VANCOMYCIN HCL IN DEXTROSE 5 % (VANCOMYCIN IN DEXTROSE 5 %) 1 GRAM/200 ML PGBK        WARFARIN (COUMADIN) 5 MG TABLET    TAKE 1 AND 1/2 TABLETS BY  MOUTH ON WEDNESDAY AND  SUNDAY AND 1 TABLET ALL  REMAINING DAY AS DIRECTED  BY THE COUMADIN CLINIC.       Review of patient's allergies indicates:   Allergen Reactions    Sulfa (sulfonamide antibiotics)      Other reaction(s): Stomach upset    Sulfamethoxazole Rash    Trimethoprim Rash     Bactrim         Past Surgical History:   Procedure Laterality Date    CARDIAC ELECTROPHYSIOLOGY MAPPING AND ABLATION      POPLITEAL ARTERY STENT  2013    RECTOPERITONEAL FISTULA CLOSURE      TOE SURGERY      Joint release    TONSILLECTOMY         History reviewed. No pertinent family history.    Social History     Social History    Marital status:      Spouse name: N/A    Number of children: N/A    Years of education: N/A     Occupational History    Retired [employer]     Social History Main Topics    Smoking status: Former Smoker     Packs/day: 4.00     Years: 30.00     Types: Cigarettes     Quit date: 1/9/1994    Smokeless tobacco: Never Used    Alcohol use No    Drug use: No    Sexual activity: Not on file     Other Topics Concern    Not on file     Social History Narrative       Vitals:    03/24/17 1507   BP: 136/72   Pulse: 94   Weight: 109 kg (240 lb 4.8 oz)   Height: 5' 6" (1.676 m)   PainSc: 0-No pain       Hemoglobin A1C   Date Value Ref Range Status   11/09/2016 6.9 (H) 4.5 - 6.2 % Final     Comment:     According to ADA guidelines, hemoglobin A1C <7.0% represents  optimal control in non-pregnant diabetic patients.  Different  metrics may apply to specific populations.   Standards of Medical Care in Diabetes - " 2016.  For the purpose of screening for the presence of diabetes:  <5.7%     Consistent with the absence of diabetes  5.7-6.4%  Consistent with increasing risk for diabetes   (prediabetes)  >or=6.5%  Consistent with diabetes  Currently no consensus exists for use of hemoglobin A1C  for diagnosis of diabetes for children.     08/10/2016 8.1 (H) 4.5 - 6.2 % Final     Comment:     According to ADA guidelines, hemoglobin A1C <7.0% represents  optimal control in non-pregnant diabetic patients.  Different  metrics may apply to specific populations.   Standards of Medical Care in Diabetes - 2016.  For the purpose of screening for the presence of diabetes:  <5.7%     Consistent with the absence of diabetes  5.7-6.4%  Consistent with increasing risk for diabetes   (prediabetes)  >or=6.5%  Consistent with diabetes  Currently no consensus exists for use of hemoglobin A1C  for diagnosis of diabetes for children.     05/09/2016 8.0 (H) 4.5 - 6.2 % Final       Review of Systems   Constitutional: Negative for chills and fever.   Respiratory: Negative for shortness of breath.    Cardiovascular: Positive for leg swelling. Negative for chest pain, palpitations, orthopnea and claudication.   Gastrointestinal: Negative for diarrhea, nausea and vomiting.   Musculoskeletal: Negative for joint pain.   Skin: Negative for rash.   Neurological: Positive for tingling and sensory change. Negative for dizziness, focal weakness and weakness.   Endo/Heme/Allergies: Bruises/bleeds easily.   Psychiatric/Behavioral: Negative.          Objective:      Physical examination: General: Patient is in no acute distress, alert and oriented x 3.  Dressing to left foot clean, dry, and intact.   Lower Extremity Exam:  Vascular: Dorsalis pedis and Posterior tibial pulses palpable on left foot.  Capillary fill time <4 sec to toes on left foot. Minimal edema noted on left foot.   Dermatologic: Incision site well copated on left foot. Negative erythema, drainage,  or increased temp noted to surgical site. Previous ulcer noted to left plantar 5th submetatarsal is closed with epithealized tissue noted.   Neurological: Light touch sensation intact to left foot.   Musculoskeletal: Negative pain on palpation/ROM of left foot.         Assessment:       Encounter Diagnoses   Name Primary?    S/P amputation of lesser toe, left     Type II diabetes mellitus with peripheral circulatory disorder Yes    Pre-ulcerative calluses     PVD (peripheral vascular disease)          Plan:   Type II diabetes mellitus with peripheral circulatory disorder  -     DIABETIC SHOES FOR HOME USE    S/P amputation of lesser toe, left  -     Sedimentation rate, manual; Future; Expected date: 3/24/17  -     C-reactive protein; Future; Expected date: 3/24/17  -     CBC auto differential; Future; Expected date: 3/24/17  -     DIABETIC SHOES FOR HOME USE    Pre-ulcerative calluses  -     DIABETIC SHOES FOR HOME USE    PVD (peripheral vascular disease)      I counseled the patient on his conditions, their implications and medical management.  Prescription written for Diabetic shoes and inserts.   Ordered CBC, ESR, CRP, to rule out need for further antibiotics.   Patient should call the clinic immediately if any signs of infection such as fever chills sweats increased redness or pain.  Patient to return in 1 month or sooner if needed.                 Nic Castillo DPM  [facility]

## 2017-03-27 ENCOUNTER — TELEPHONE (OUTPATIENT)
Dept: PODIATRY | Facility: CLINIC | Age: 77
End: 2017-03-27

## 2017-03-27 NOTE — TELEPHONE ENCOUNTER
Contacted  to inform him that  stated/ordered him to stop taking his antibiotics and his daughter () answered the phone and stated she would inform her father of the medication change/treatment. Both parties stated understanding and the call ended well.

## 2017-03-30 ENCOUNTER — TELEPHONE (OUTPATIENT)
Dept: PODIATRY | Facility: CLINIC | Age: 77
End: 2017-03-30

## 2017-03-30 ENCOUNTER — TELEPHONE (OUTPATIENT)
Dept: ADMINISTRATIVE | Facility: CLINIC | Age: 77
End: 2017-03-30

## 2017-03-30 NOTE — TELEPHONE ENCOUNTER
Returned 's call regarding her wanting to inform us of her dad's appointment with  on Monday.  stated the blood flow in his leg has decreased to 40% and there is an opening under his foot. Home Health applied pads to the areas and stated they will contact  soon.  also stated she would like to know if  wants to see her father sooner than his scheduled appointment or 04/27/2017. I informed  that I will contact her  after I speak with  or I will get  to contact her when she has completed her visit with another patient.  stated understanding and the call ended well.

## 2017-03-30 NOTE — TELEPHONE ENCOUNTER
----- Message from Indu Sam sent at 3/30/2017 12:22 PM CDT -----  Contact: daughter-dimitrios  Pt daughter Dimitrios states went to to the surgeon who did leg bypass and got bad news so she would like the dr or nurse to contact her cause pt has a sore that is opening up so she would like a call,please....[phone]

## 2017-03-31 ENCOUNTER — OFFICE VISIT (OUTPATIENT)
Dept: PODIATRY | Facility: CLINIC | Age: 77
End: 2017-03-31
Payer: COMMERCIAL

## 2017-03-31 VITALS
SYSTOLIC BLOOD PRESSURE: 136 MMHG | HEIGHT: 66 IN | DIASTOLIC BLOOD PRESSURE: 57 MMHG | HEART RATE: 81 BPM | BODY MASS INDEX: 39.04 KG/M2 | WEIGHT: 242.94 LBS

## 2017-03-31 DIAGNOSIS — L97.521 NEUROPATHIC FOOT ULCER, LEFT, LIMITED TO BREAKDOWN OF SKIN: Primary | ICD-10-CM

## 2017-03-31 DIAGNOSIS — Z89.422 S/P AMPUTATION OF LESSER TOE, LEFT: ICD-10-CM

## 2017-03-31 DIAGNOSIS — E11.51 TYPE II DIABETES MELLITUS WITH PERIPHERAL CIRCULATORY DISORDER: ICD-10-CM

## 2017-03-31 PROCEDURE — 1159F MED LIST DOCD IN RCRD: CPT | Mod: S$GLB,,, | Performed by: PODIATRIST

## 2017-03-31 PROCEDURE — 4010F ACE/ARB THERAPY RXD/TAKEN: CPT | Mod: S$GLB,,, | Performed by: PODIATRIST

## 2017-03-31 PROCEDURE — 1126F AMNT PAIN NOTED NONE PRSNT: CPT | Mod: S$GLB,,, | Performed by: PODIATRIST

## 2017-03-31 PROCEDURE — 3078F DIAST BP <80 MM HG: CPT | Mod: S$GLB,,, | Performed by: PODIATRIST

## 2017-03-31 PROCEDURE — 11042 DBRDMT SUBQ TIS 1ST 20SQCM/<: CPT | Mod: 78,S$GLB,, | Performed by: PODIATRIST

## 2017-03-31 PROCEDURE — 99999 PR PBB SHADOW E&M-EST. PATIENT-LVL III: CPT | Mod: PBBFAC,,, | Performed by: PODIATRIST

## 2017-03-31 PROCEDURE — 1157F ADVNC CARE PLAN IN RCRD: CPT | Mod: S$GLB,,, | Performed by: PODIATRIST

## 2017-03-31 PROCEDURE — 3044F HG A1C LEVEL LT 7.0%: CPT | Mod: S$GLB,,, | Performed by: PODIATRIST

## 2017-03-31 PROCEDURE — 3075F SYST BP GE 130 - 139MM HG: CPT | Mod: S$GLB,,, | Performed by: PODIATRIST

## 2017-03-31 PROCEDURE — 99212 OFFICE O/P EST SF 10 MIN: CPT | Mod: 24,25,S$GLB, | Performed by: PODIATRIST

## 2017-03-31 PROCEDURE — 1160F RVW MEDS BY RX/DR IN RCRD: CPT | Mod: S$GLB,,, | Performed by: PODIATRIST

## 2017-03-31 RX ORDER — HYDROCODONE BITARTRATE AND ACETAMINOPHEN 5; 325 MG/1; MG/1
TABLET ORAL
COMMUNITY
Start: 2017-03-08 | End: 2017-06-09

## 2017-04-03 ENCOUNTER — ANTI-COAG VISIT (OUTPATIENT)
Dept: CARDIOLOGY | Facility: CLINIC | Age: 77
End: 2017-04-03

## 2017-04-03 LAB — INR PPP: 1.6

## 2017-04-03 NOTE — PROGRESS NOTES
Subjective:     Patient ID: Raymond Stuart is a 77 y.o. male.    Chief Complaint: Foot Ulcer (Bottom of left foot(ball of foot-draining pink drainage). Patient states no pain.)    Raymond is a 77 y.o. male who presents to the clinic for evaluation and treatment of high risk feet. Raymond has a past medical history of Diabetes mellitus, type 2; Hyperlipidemia; Hypertension; Irregular heartbeat; PVD (peripheral vascular disease); S/P femoral-popliteal bypass surgery (8/8/2014); and S/P peripheral artery angioplasty (8/8/2014). The patient's chief complaint is diabetic foot ulcer, left foot. Patient states he noticed area last week. Patient states home health applied betadine, doughnut pad and gauze to area.  Patient states he circulation is blocked again and Dr. Judge has scheduled him for April 11.  This patient has documented high risk feet requiring routine maintenance secondary to diabetes mellitis and those secondary complications of diabetes, as mentioned..    PCP: JOHNATHON Cristobal Jr, MD    Date Last Seen by PCP: 2/3/17    Current shoe gear:  Affected Foot: Casual shoes     Unaffected Foot: Casual shoes    History of Trauma: negative  Sign of Infection: none    Hemoglobin A1C   Date Value Ref Range Status   11/09/2016 6.9 (H) 4.5 - 6.2 % Final     Comment:     According to ADA guidelines, hemoglobin A1C <7.0% represents  optimal control in non-pregnant diabetic patients.  Different  metrics may apply to specific populations.   Standards of Medical Care in Diabetes - 2016.  For the purpose of screening for the presence of diabetes:  <5.7%     Consistent with the absence of diabetes  5.7-6.4%  Consistent with increasing risk for diabetes   (prediabetes)  >or=6.5%  Consistent with diabetes  Currently no consensus exists for use of hemoglobin A1C  for diagnosis of diabetes for children.     08/10/2016 8.1 (H) 4.5 - 6.2 % Final     Comment:     According to ADA guidelines, hemoglobin A1C <7.0% represents  optimal control  in non-pregnant diabetic patients.  Different  metrics may apply to specific populations.   Standards of Medical Care in Diabetes - 2016.  For the purpose of screening for the presence of diabetes:  <5.7%     Consistent with the absence of diabetes  5.7-6.4%  Consistent with increasing risk for diabetes   (prediabetes)  >or=6.5%  Consistent with diabetes  Currently no consensus exists for use of hemoglobin A1C  for diagnosis of diabetes for children.     05/09/2016 8.0 (H) 4.5 - 6.2 % Final           Patient Active Problem List   Diagnosis    Hyperlipidemia associated with type 2 diabetes mellitus    Essential hypertension    A-fib    PVD (peripheral vascular disease)    Special screening for malignant neoplasms, colon    S/P femoral-popliteal bypass surgery    S/P peripheral artery angioplasty    Type 2 diabetes mellitus with microalbuminuria    Type 2 diabetes mellitus with diabetic neuropathy    Osteomyelitis of toe of left foot       Medication List with Changes/Refills   Current Medications    ATENOLOL (TENORMIN) 100 MG TABLET    Take 1 tablet by mouth  daily    CLOPIDOGREL (PLAVIX) 75 MG TABLET    Take 1 tablet by mouth  daily    DIPHENHYDRAMINE (BENADRYL) 50 MG/ML INJECTION        DOXYCYCLINE (VIBRAMYCIN) 100 MG CAP        FENOFIBRATE MICRONIZED (LOFIBRA) 134 MG CAP    Take 1 capsule by mouth  once daily    GABAPENTIN (NEURONTIN) 600 MG TABLET    Take 2 tablets (1,200 mg total) by mouth 2 (two) times daily.    GLIPIZIDE-METFORMIN (METAGLIP) 2.5-500 MG PER TABLET    Take 2 tablets by mouth  twice a day before meals in the morning and evening    HYDROCODONE-ACETAMINOPHEN 5-325MG (NORCO) 5-325 MG PER TABLET        HYDROCODONE-ACETAMINOPHEN 7.5-325MG (NORCO) 7.5-325 MG PER TABLET    Take 1 tablet by mouth every 6 (six) hours as needed for Pain.    INVANZ 1 GRAM INJECTION        LISINOPRIL-HYDROCHLOROTHIAZIDE (PRINZIDE,ZESTORETIC) 20-12.5 MG PER TABLET    Take 2 tablets by mouth  once daily     "MINOCYCLINE (MINOCIN,DYNACIN) 100 MG CAPSULE    Take 100 mg by mouth.     NIACIN 1,000 MG TBSR    Take 1 tablet by mouth Daily.    ROSUVASTATIN (CRESTOR) 40 MG TAB    Take 1 tablet (40 mg total) by mouth once daily.    SANTYL OINTMENT        VANCOMYCIN HCL IN DEXTROSE 5 % (VANCOMYCIN IN DEXTROSE 5 %) 1 GRAM/200 ML PGBK        WARFARIN (COUMADIN) 5 MG TABLET    TAKE 1 AND 1/2 TABLETS BY  MOUTH ON WEDNESDAY AND  SUNDAY AND 1 TABLET ALL  REMAINING DAY AS DIRECTED  BY THE COUMADIN CLINIC.       Review of patient's allergies indicates:   Allergen Reactions    Sulfa (sulfonamide antibiotics)      Other reaction(s): Stomach upset    Sulfamethoxazole Rash    Trimethoprim Rash     Bactrim         Past Surgical History:   Procedure Laterality Date    CARDIAC ELECTROPHYSIOLOGY MAPPING AND ABLATION      POPLITEAL ARTERY STENT  2013    RECTOPERITONEAL FISTULA CLOSURE      TOE SURGERY      Joint release    TONSILLECTOMY         History reviewed. No pertinent family history.    Social History     Social History    Marital status:      Spouse name: N/A    Number of children: N/A    Years of education: N/A     Occupational History    Retired [employer]     Social History Main Topics    Smoking status: Former Smoker     Packs/day: 4.00     Years: 30.00     Types: Cigarettes     Quit date: 1/9/1994    Smokeless tobacco: Never Used    Alcohol use No    Drug use: No    Sexual activity: Not on file     Other Topics Concern    Not on file     Social History Narrative       Vitals:    03/31/17 1542   BP: (!) 136/57   Pulse: 81   Weight: 110.2 kg (242 lb 15.2 oz)   Height: 5' 6" (1.676 m)   PainSc: 0-No pain       Hemoglobin A1C   Date Value Ref Range Status   11/09/2016 6.9 (H) 4.5 - 6.2 % Final     Comment:     According to ADA guidelines, hemoglobin A1C <7.0% represents  optimal control in non-pregnant diabetic patients.  Different  metrics may apply to specific populations.   Standards of Medical Care " in Diabetes - 2016.  For the purpose of screening for the presence of diabetes:  <5.7%     Consistent with the absence of diabetes  5.7-6.4%  Consistent with increasing risk for diabetes   (prediabetes)  >or=6.5%  Consistent with diabetes  Currently no consensus exists for use of hemoglobin A1C  for diagnosis of diabetes for children.     08/10/2016 8.1 (H) 4.5 - 6.2 % Final     Comment:     According to ADA guidelines, hemoglobin A1C <7.0% represents  optimal control in non-pregnant diabetic patients.  Different  metrics may apply to specific populations.   Standards of Medical Care in Diabetes - 2016.  For the purpose of screening for the presence of diabetes:  <5.7%     Consistent with the absence of diabetes  5.7-6.4%  Consistent with increasing risk for diabetes   (prediabetes)  >or=6.5%  Consistent with diabetes  Currently no consensus exists for use of hemoglobin A1C  for diagnosis of diabetes for children.     05/09/2016 8.0 (H) 4.5 - 6.2 % Final       Review of Systems   Constitutional: Negative for chills and fever.   Respiratory: Negative for shortness of breath.    Cardiovascular: Negative for chest pain, palpitations, orthopnea, claudication and leg swelling.   Gastrointestinal: Negative for diarrhea, nausea and vomiting.   Musculoskeletal: Negative for joint pain.   Skin: Negative for rash.   Neurological: Negative for dizziness, tingling, sensory change, focal weakness and weakness.           Objective:      Physical examination: General: Patient is in no acute distress, alert and oriented x 3.  Dressing to left foot clean, dry, and intact.   Lower Extremity Exam:  Vascular: Dorsalis pedis and Posterior tibial pulses palpable on left foot.  Capillary fill time <4 sec to toes on left foot. Minimal edema noted on left foot.   Dermatologic: Ulcer noted to left plantar 1st submetatarsal with pink granular base. No drainage, no sinus tract noted. Ulcer measurement 5bap1oa. Incision site well copated on  left foot. Negative erythema, drainage, or increased temp noted to surgical site. Previous ulcer noted to left plantar 5th submetatarsal is closed with epithealized tissue noted.  Neurological: Light touch sensation intact to left foot.   Musculoskeletal: Negative pain on palpation/ROM of left foot.         Assessment:       Encounter Diagnoses   Name Primary?    Neuropathic foot ulcer, left, limited to breakdown of skin Yes    S/P amputation of lesser toe, left     Type II diabetes mellitus with peripheral circulatory disorder          Plan:   Neuropathic foot ulcer, left, limited to breakdown of skin    S/P amputation of lesser toe, left    Type II diabetes mellitus with peripheral circulatory disorder    I counseled the patient on his conditions, their implications and medical management.  With patient's permission, the left foot ulcer was sharply excisionally debrided of viable and nonviable tissue down to good bleeding granular tissue base utilizing sterile #15 blade and tissue nipper and forceps. Wound was irrigated with sterile saline and bleeding was controlled with direct pressure. Minimal blood loss. The patient tolerated this well. Wound was then dressed with normlgel, mesalt, and wide band-aid. Patient was given instructions on daily dressing changes. Patient was also instructed on the importance of keeping the wound and dressings clean dry and intact and keeping pressure off the wound area until complete healing of the wound.The patient is alerted to watch for any signs of infection (redness, pus, pain, increased swelling or fever) and call if such occurs. Home wound care instructions are provided.  Continue home health.   Patient to return in 3 weeks or sooner if needed                 Nic Castillo DPM  [facility]

## 2017-04-03 NOTE — PROGRESS NOTES
Verbal result taken from __Kika/Kami _______. PT/INR __1.6_____ Date drawn___4/3___ patient confirmed dose and compliance. Begin 2.5mg on Thursdays and 5mg all other days. Repeat INR in 1 week.

## 2017-04-10 LAB — INR PPP: 1.1

## 2017-04-11 ENCOUNTER — ANTI-COAG VISIT (OUTPATIENT)
Dept: CARDIOLOGY | Facility: CLINIC | Age: 77
End: 2017-04-11

## 2017-04-11 NOTE — PROGRESS NOTES
Faxed result taken from _Susy/Kami _______. PT/INR __12.8/1.1_____ Date drawn___4/10_____ Patient has been off coumadin snce last Wednesday due to scheduled procedure per . Procedure is today. Take 10mg tonight then begin 5mg daily. Repeat INR in 1 week. Orders faxed.

## 2017-04-18 LAB — INR PPP: 1.4

## 2017-04-19 ENCOUNTER — ANTI-COAG VISIT (OUTPATIENT)
Dept: CARDIOLOGY | Facility: CLINIC | Age: 77
End: 2017-04-19

## 2017-04-19 NOTE — PROGRESS NOTES
Verbal result taken from __Kika/Kami _______. PT/INR __17.3/1.4____ Date drawn___4/18____ Take 10mg tonight then begin 5mg daily. Repeat INR in 1 week. Orders faxed.

## 2017-04-20 RX ORDER — WARFARIN SODIUM 5 MG/1
TABLET ORAL
Qty: 96 TABLET | Refills: 3 | Status: SHIPPED | OUTPATIENT
Start: 2017-04-20 | End: 2017-05-22 | Stop reason: SDUPTHER

## 2017-04-21 ENCOUNTER — OFFICE VISIT (OUTPATIENT)
Dept: PODIATRY | Facility: CLINIC | Age: 77
End: 2017-04-21
Payer: COMMERCIAL

## 2017-04-21 VITALS
SYSTOLIC BLOOD PRESSURE: 104 MMHG | DIASTOLIC BLOOD PRESSURE: 55 MMHG | HEART RATE: 100 BPM | BODY MASS INDEX: 39.58 KG/M2 | WEIGHT: 246.25 LBS | HEIGHT: 66 IN

## 2017-04-21 DIAGNOSIS — Z89.422 S/P AMPUTATION OF LESSER TOE, LEFT: ICD-10-CM

## 2017-04-21 DIAGNOSIS — L97.521 NEUROPATHIC FOOT ULCER, LEFT, LIMITED TO BREAKDOWN OF SKIN: Primary | ICD-10-CM

## 2017-04-21 DIAGNOSIS — I73.9 PVD (PERIPHERAL VASCULAR DISEASE): ICD-10-CM

## 2017-04-21 PROCEDURE — 99999 PR PBB SHADOW E&M-EST. PATIENT-LVL III: CPT | Mod: PBBFAC,,, | Performed by: PODIATRIST

## 2017-04-21 PROCEDURE — 11042 DBRDMT SUBQ TIS 1ST 20SQCM/<: CPT | Mod: 58,S$GLB,, | Performed by: PODIATRIST

## 2017-04-21 PROCEDURE — 99024 POSTOP FOLLOW-UP VISIT: CPT | Mod: S$GLB,,, | Performed by: PODIATRIST

## 2017-04-21 NOTE — MR AVS SNAPSHOT
[facility]  [address] [place] [address]  Phone: [phone]  Fax: [phone]                  Raymond Stuart   2017 3:40 PM   Office Visit    Description:  Male : 1940   Provider:  Nci Castillo DPM   Department:  [facility]           Reason for Visit     Follow-up     Diabetes Mellitus           Diagnoses this Visit        Comments    S/P amputation of lesser toe, left    -  Primary            To Do List           Future Appointments        Provider Department Dept Phone    2017 3:15 PM [facility] XR2 [facility] [phone]    2017 3:40 PM Nic Castillo DPM [facility] [phone]    2017 7:35 AM LABORATORY, [facility] [phone]    2017 7:40 AM SPECIMEN, [facility] [phone]    2017 1:20 PM Vaishnavi De Luna NP [facility] D[facility]at[facility] [phone]      Goals (5 Years of Data)     None      [facility] On [facility]     [facility] Care Line -  Assistance  Unless otherwise directed by your provider, please contact [facility], our nurse care line that is available for  assistance.     Registered nurses in the [facility] provide: appointment scheduling, clinical advisement, health education, and other advisory services.  Call: [phone] (toll free)               Medications           Message regarding Medications     Verify the changes and/or additions to your medication regime listed below are the same as discussed with your clinician today.  If any of these changes or additions are incorrect, please notify your healthcare provider.             Verify that the below list of medications is an accurate representation of the medications you are currently taking.  If none reported, the list may be blank. If incorrect, please contact your healthcare provider. Carry this list with you in case of emergency.           Current Medications      "atenolol (TENORMIN) 100 MG tablet Take 1 tablet by mouth  daily    clopidogrel (PLAVIX) 75 mg tablet Take 1 tablet by mouth  daily    diphenhydrAMINE (BENADRYL) 50 mg/mL injection     doxycycline (VIBRAMYCIN) 100 MG Cap     fenofibrate micronized (LOFIBRA) 134 MG Cap Take 1 capsule by mouth  once daily    gabapentin (NEURONTIN) 600 MG tablet Take 2 tablets (1,200 mg total) by mouth 2 (two) times daily.    glipizide-metformin (METAGLIP) 2.5-500 mg per tablet Take 2 tablets by mouth  twice a day before meals in the morning and evening    hydrocodone-acetaminophen 5-325mg (NORCO) 5-325 mg per tablet     hydrocodone-acetaminophen 7.5-325mg (NORCO) 7.5-325 mg per tablet Take 1 tablet by mouth every 6 (six) hours as needed for Pain.    INVANZ 1 gram injection     lisinopril-hydrochlorothiazide (PRINZIDE,ZESTORETIC) 20-12.5 mg per tablet Take 2 tablets by mouth  once daily    minocycline (MINOCIN,DYNACIN) 100 MG capsule Take 100 mg by mouth.     niacin 1,000 mg TbSR Take 1 tablet by mouth Daily.    rosuvastatin (CRESTOR) 40 MG Tab Take 1 tablet (40 mg total) by mouth once daily.    SANTYL ointment     VANCOMYCIN HCL IN DEXTROSE 5 % (VANCOMYCIN IN DEXTROSE 5 %) 1 gram/200 mL PgBk     warfarin (COUMADIN) 5 MG tablet TAKE 1 AND 1/2 TABLETS BY  MOUTH ON WEDNESDAY AND  SUNDAY AND 1 TABLET ALL  REMAINING DAY AS DIRECTED  BY THE COUMADIN CLINIC.           Clinical Reference Information           Your Vitals Were     BP Pulse Height Weight BMI    104/55 (BP Location: Right arm, Patient Position: Sitting, BP Method: Automatic) 100 5' 6" (1.676 m) 111.7 kg (246 lb 4.1 oz) 39.75 kg/m2      Blood Pressure          Most Recent Value    BP  (!)  104/55      Allergies as of 4/21/2017     Sulfa (Sulfonamide Antibiotics)    Sulfamethoxazole    Trimethoprim      Immunizations Administered on Date of Encounter - 4/21/2017     None      Orders Placed During Today's Visit     Future Labs/Procedures Expected by Expires    X-Ray Foot Complete " Left  4/21/2017 4/21/2018      Language Assistance Services     ATTENTION: Language assistance services are available, free of charge. Please call [phone].      ATENCIÓN: Si habla lynn, tiene a villagran disposición servicios gratuitos de asistencia lingüística. Llame al [phone].     CHÚ Ý: N?u b?n nói Ti?ng Vi?t, có các d?ch v? h? tr? ngôn ng? mi?n phí dành cho b?n. G?i s? [phone].         [facility] complies with applicable Federal civil rights laws and does not discriminate on the basis of race, color, national origin, age, disability, or sex.

## 2017-04-25 ENCOUNTER — ANTI-COAG VISIT (OUTPATIENT)
Dept: CARDIOLOGY | Facility: CLINIC | Age: 77
End: 2017-04-25

## 2017-04-25 LAB — INR PPP: 1.6

## 2017-04-25 NOTE — PROGRESS NOTES
Verbal result taken from __Susy/Kami _______. PT/INR _1.6_____ Date drawn___4/25____ patient did not increase his dose last week as instructed. Begin 10mg on Tuesdays and 5mg all other days. Repeat INR in 1 week.

## 2017-04-27 ENCOUNTER — TELEPHONE (OUTPATIENT)
Dept: DERMATOLOGY | Facility: CLINIC | Age: 77
End: 2017-04-27

## 2017-04-27 NOTE — TELEPHONE ENCOUNTER
----- Message from Rosalina Zhao sent at 4/27/2017  1:32 PM CDT -----  Contact: pt daughter - Yarely   States she is calling rg pt having an appt with Ms De Luna but received a call stating pt should schedule with Dr Gomez but is on maternity leave and wants to discuss and can be reached at [phone]//thanks/dbw

## 2017-05-02 ENCOUNTER — ANTI-COAG VISIT (OUTPATIENT)
Dept: CARDIOLOGY | Facility: CLINIC | Age: 77
End: 2017-05-02

## 2017-05-02 LAB — INR PPP: 2.1

## 2017-05-02 NOTE — PROGRESS NOTES
Verbal result taken from __Susy/Kami _______. PT/INR __2.1_____ Date drawn___5/2_____ continue dose, repeat INR in 1 week.

## 2017-05-08 ENCOUNTER — TELEPHONE (OUTPATIENT)
Dept: OTOLARYNGOLOGY | Facility: CLINIC | Age: 77
End: 2017-05-08

## 2017-05-08 NOTE — TELEPHONE ENCOUNTER
----- Message from Marvin Hodge sent at 5/8/2017  8:45 AM CDT -----  Contact: Yarely Lelo (Daughter)  Yarelyangelina Vergarare (Daughter) is requesting a call from nurse to discuss some concerns with wounds that has open up on the pt leg with redness and blood.            Please call pt back at [phone]

## 2017-05-08 NOTE — TELEPHONE ENCOUNTER
Ms. Pritchett was informed to take her father, Mr. Raymond Stuart to the hospital, urgent care clinic, or lake after hours, and follow up with us at his Friday, May 12 appointment. Ms. Pritchett verbalized understanding, and the matter was taken care of.

## 2017-05-09 ENCOUNTER — ANTI-COAG VISIT (OUTPATIENT)
Dept: CARDIOLOGY | Facility: CLINIC | Age: 77
End: 2017-05-09

## 2017-05-09 LAB — INR PPP: 2.7

## 2017-05-09 NOTE — PROGRESS NOTES
Verbal result taken from __Susy/Kami _______. PT/INR __32.4/2.7____ Date drawn_5/9____ continue dose, repeat INR in 1 week. Susy verbalized understanding.

## 2017-05-12 ENCOUNTER — HOSPITAL ENCOUNTER (OUTPATIENT)
Dept: RADIOLOGY | Facility: HOSPITAL | Age: 77
Discharge: HOME | End: 2017-05-12
Attending: PODIATRIST
Payer: COMMERCIAL

## 2017-05-12 ENCOUNTER — OFFICE VISIT (OUTPATIENT)
Dept: PODIATRY | Facility: CLINIC | Age: 77
End: 2017-05-12
Payer: COMMERCIAL

## 2017-05-12 VITALS
HEIGHT: 66 IN | DIASTOLIC BLOOD PRESSURE: 85 MMHG | TEMPERATURE: 99 F | WEIGHT: 239.44 LBS | HEART RATE: 107 BPM | BODY MASS INDEX: 38.48 KG/M2 | SYSTOLIC BLOOD PRESSURE: 148 MMHG

## 2017-05-12 DIAGNOSIS — L02.619 CELLULITIS AND ABSCESS OF FOOT: Primary | ICD-10-CM

## 2017-05-12 DIAGNOSIS — L98.492 NEUROPATHIC ULCER, WITH FAT LAYER EXPOSED: ICD-10-CM

## 2017-05-12 DIAGNOSIS — L03.119 CELLULITIS AND ABSCESS OF FOOT: Primary | ICD-10-CM

## 2017-05-12 DIAGNOSIS — Z89.422 S/P AMPUTATION OF LESSER TOE, LEFT: ICD-10-CM

## 2017-05-12 DIAGNOSIS — E11.51 TYPE II DIABETES MELLITUS WITH PERIPHERAL CIRCULATORY DISORDER: ICD-10-CM

## 2017-05-12 PROCEDURE — 3044F HG A1C LEVEL LT 7.0%: CPT | Mod: S$GLB,,, | Performed by: PODIATRIST

## 2017-05-12 PROCEDURE — 1159F MED LIST DOCD IN RCRD: CPT | Mod: S$GLB,,, | Performed by: PODIATRIST

## 2017-05-12 PROCEDURE — 73630 X-RAY EXAM OF FOOT: CPT | Mod: 26,LT,, | Performed by: RADIOLOGY

## 2017-05-12 PROCEDURE — 99999 PR PBB SHADOW E&M-EST. PATIENT-LVL III: CPT | Mod: PBBFAC,,, | Performed by: PODIATRIST

## 2017-05-12 PROCEDURE — 3077F SYST BP >= 140 MM HG: CPT | Mod: S$GLB,,, | Performed by: PODIATRIST

## 2017-05-12 PROCEDURE — 73630 X-RAY EXAM OF FOOT: CPT | Mod: TC,PO,LT

## 2017-05-12 PROCEDURE — 4010F ACE/ARB THERAPY RXD/TAKEN: CPT | Mod: S$GLB,,, | Performed by: PODIATRIST

## 2017-05-12 PROCEDURE — 1160F RVW MEDS BY RX/DR IN RCRD: CPT | Mod: S$GLB,,, | Performed by: PODIATRIST

## 2017-05-12 PROCEDURE — 3079F DIAST BP 80-89 MM HG: CPT | Mod: S$GLB,,, | Performed by: PODIATRIST

## 2017-05-12 PROCEDURE — 99024 POSTOP FOLLOW-UP VISIT: CPT | Mod: S$GLB,,, | Performed by: PODIATRIST

## 2017-05-12 RX ORDER — CEPHALEXIN 500 MG/1
CAPSULE ORAL
COMMUNITY
Start: 2017-05-11 | End: 2017-11-30 | Stop reason: ALTCHOICE

## 2017-05-12 RX ORDER — PREDNISONE 20 MG/1
TABLET ORAL
COMMUNITY
Start: 2017-05-06 | End: 2018-10-26

## 2017-05-15 ENCOUNTER — PATIENT MESSAGE (OUTPATIENT)
Dept: PODIATRY | Facility: CLINIC | Age: 77
End: 2017-05-15

## 2017-05-16 ENCOUNTER — LAB VISIT (OUTPATIENT)
Dept: LAB | Facility: HOSPITAL | Age: 77
End: 2017-05-16
Attending: PEDIATRICS
Payer: COMMERCIAL

## 2017-05-16 DIAGNOSIS — R80.9 TYPE 2 DIABETES MELLITUS WITH MICROALBUMINURIA, WITHOUT LONG-TERM CURRENT USE OF INSULIN: ICD-10-CM

## 2017-05-16 DIAGNOSIS — E11.69 HYPERLIPIDEMIA ASSOCIATED WITH TYPE 2 DIABETES MELLITUS: ICD-10-CM

## 2017-05-16 DIAGNOSIS — E11.29 TYPE 2 DIABETES MELLITUS WITH MICROALBUMINURIA, WITHOUT LONG-TERM CURRENT USE OF INSULIN: ICD-10-CM

## 2017-05-16 DIAGNOSIS — E78.5 HYPERLIPIDEMIA ASSOCIATED WITH TYPE 2 DIABETES MELLITUS: ICD-10-CM

## 2017-05-16 LAB
ALT SERPL W/O P-5'-P-CCNC: 23 U/L
ANION GAP SERPL CALC-SCNC: 8 MMOL/L
AST SERPL-CCNC: 21 U/L
BUN SERPL-MCNC: 26 MG/DL
CALCIUM SERPL-MCNC: 9.9 MG/DL
CHLORIDE SERPL-SCNC: 105 MMOL/L
CHOLEST/HDLC SERPL: 6.8 {RATIO}
CO2 SERPL-SCNC: 27 MMOL/L
CREAT SERPL-MCNC: 1.5 MG/DL
EST. GFR  (AFRICAN AMERICAN): 51.9 ML/MIN/1.73 M^2
EST. GFR  (NON AFRICAN AMERICAN): 44.9 ML/MIN/1.73 M^2
GLUCOSE SERPL-MCNC: 91 MG/DL
HDL/CHOLESTEROL RATIO: 14.8 %
HDLC SERPL-MCNC: 12 MG/DL
HDLC SERPL-MCNC: 81 MG/DL
LDLC SERPL CALC-MCNC: 31 MG/DL
NONHDLC SERPL-MCNC: 69 MG/DL
POTASSIUM SERPL-SCNC: 4.6 MMOL/L
SODIUM SERPL-SCNC: 140 MMOL/L
TRIGL SERPL-MCNC: 190 MG/DL

## 2017-05-16 PROCEDURE — 84460 ALANINE AMINO (ALT) (SGPT): CPT

## 2017-05-16 PROCEDURE — 83036 HEMOGLOBIN GLYCOSYLATED A1C: CPT

## 2017-05-16 PROCEDURE — 36415 COLL VENOUS BLD VENIPUNCTURE: CPT | Mod: PO

## 2017-05-16 PROCEDURE — 84450 TRANSFERASE (AST) (SGOT): CPT

## 2017-05-16 PROCEDURE — 80048 BASIC METABOLIC PNL TOTAL CA: CPT

## 2017-05-16 PROCEDURE — 80061 LIPID PANEL: CPT

## 2017-05-17 LAB
ESTIMATED AVG GLUCOSE: 163 MG/DL
HBA1C MFR BLD HPLC: 7.3 %

## 2017-05-19 ENCOUNTER — PATIENT MESSAGE (OUTPATIENT)
Dept: PODIATRY | Facility: CLINIC | Age: 77
End: 2017-05-19

## 2017-05-19 ENCOUNTER — OFFICE VISIT (OUTPATIENT)
Dept: PODIATRY | Facility: CLINIC | Age: 77
End: 2017-05-19
Payer: COMMERCIAL

## 2017-05-19 VITALS
SYSTOLIC BLOOD PRESSURE: 120 MMHG | HEIGHT: 66 IN | BODY MASS INDEX: 38.48 KG/M2 | TEMPERATURE: 99 F | DIASTOLIC BLOOD PRESSURE: 69 MMHG | HEART RATE: 96 BPM | WEIGHT: 239.44 LBS

## 2017-05-19 DIAGNOSIS — Z89.422 S/P AMPUTATION OF LESSER TOE, LEFT: ICD-10-CM

## 2017-05-19 DIAGNOSIS — E11.51 TYPE II DIABETES MELLITUS WITH PERIPHERAL CIRCULATORY DISORDER: ICD-10-CM

## 2017-05-19 DIAGNOSIS — L98.492 NEUROPATHIC ULCER, WITH FAT LAYER EXPOSED: Primary | ICD-10-CM

## 2017-05-19 PROCEDURE — 1159F MED LIST DOCD IN RCRD: CPT | Mod: S$GLB,,, | Performed by: PODIATRIST

## 2017-05-19 PROCEDURE — 4010F ACE/ARB THERAPY RXD/TAKEN: CPT | Mod: S$GLB,,, | Performed by: PODIATRIST

## 2017-05-19 PROCEDURE — 99212 OFFICE O/P EST SF 10 MIN: CPT | Mod: S$GLB,,, | Performed by: PODIATRIST

## 2017-05-19 PROCEDURE — 3045F PR MOST RECENT HEMOGLOBIN A1C LEVEL 7.0-9.0%: CPT | Mod: S$GLB,,, | Performed by: PODIATRIST

## 2017-05-19 PROCEDURE — 1157F ADVNC CARE PLAN IN RCRD: CPT | Mod: 8P,S$GLB,, | Performed by: PODIATRIST

## 2017-05-19 PROCEDURE — 1125F AMNT PAIN NOTED PAIN PRSNT: CPT | Mod: S$GLB,,, | Performed by: PODIATRIST

## 2017-05-19 PROCEDURE — 99999 PR PBB SHADOW E&M-EST. PATIENT-LVL III: CPT | Mod: PBBFAC,,, | Performed by: PODIATRIST

## 2017-05-19 RX ORDER — HEPARIN 100 UNIT/ML
SYRINGE INTRAVENOUS
COMMUNITY
Start: 2017-05-18 | End: 2017-11-30 | Stop reason: ALTCHOICE

## 2017-05-19 RX ORDER — SODIUM CHLORIDE 0.9 % (FLUSH) 0.9 %
SYRINGE (ML) INJECTION
COMMUNITY
Start: 2017-05-18 | End: 2017-11-30 | Stop reason: ALTCHOICE

## 2017-05-19 RX ORDER — DAPTOMYCIN 50 MG/ML
INJECTION, POWDER, LYOPHILIZED, FOR SOLUTION INTRAVENOUS
Status: ON HOLD | COMMUNITY
Start: 2017-05-18 | End: 2018-12-13

## 2017-05-19 NOTE — MR AVS SNAPSHOT
[facility]  [address] [place] [address]  Phone: [phone]  Fax: [phone]                  Raymond Stuart   2017 10:00 AM   Office Visit    Description:  Male : 1940   Provider:  Nic Castillo DPM   Department:  [facility]           Reason for Visit     Follow-up     Diabetes Mellitus                To Do List           Future Appointments        Provider Department Dept Phone    2017 7:00 AM DELIA Cristobal Jr., MD [facility] - Internal Medicine [phone]    2017 3:40 PM Nic Castillo DPM [facility] Podiatry [phone]    2017 2:00 PM Adele Gomez MD [facility] Dermatology [phone]    2017 8:45 AM TAY Long OD [facility] Ophthalmology [phone]      Goals (5 Years of Data)     None      [facility] On [facility]     [facility] On Call Nurse Care Line -  Assistance  Unless otherwise directed by your provider, please contact [facility], our nurse care line that is available for  assistance.     Registered nurses in the [facility] provide: appointment scheduling, clinical advisement, health education, and other advisory services.  Call: [phone] (toll free)               Medications           Message regarding Medications     Verify the changes and/or additions to your medication regime listed below are the same as discussed with your clinician today.  If any of these changes or additions are incorrect, please notify your healthcare provider.             Verify that the below list of medications is an accurate representation of the medications you are currently taking.  If none reported, the list may be blank. If incorrect, please contact your healthcare provider. Carry this list with you in case of emergency.           Current Medications     atenolol (TENORMIN) 100 MG tablet Take 1 tablet by mouth  daily    cephALEXin (KEFLEX) 500 MG capsule     clopidogrel (PLAVIX) 75 mg tablet Take 1 tablet by mouth  daily     "daptomycin (CUBICIN) injection     diphenhydrAMINE (BENADRYL) 50 mg/mL injection     doxycycline (VIBRAMYCIN) 100 MG Cap     fenofibrate micronized (LOFIBRA) 134 MG Cap Take 1 capsule by mouth  once daily    gabapentin (NEURONTIN) 600 MG tablet Take 2 tablets (1,200 mg total) by mouth 2 (two) times daily.    glipizide-metformin (METAGLIP) 2.5-500 mg per tablet Take 2 tablets by mouth  twice a day before meals in the morning and evening    heparin, porcine, PF, (HEPARIN FLUSH 100 UNITS/ML) 100 unit/mL Syrg     hydrocodone-acetaminophen 5-325mg (NORCO) 5-325 mg per tablet     hydrocodone-acetaminophen 7.5-325mg (NORCO) 7.5-325 mg per tablet Take 1 tablet by mouth every 6 (six) hours as needed for Pain.    INVANZ 1 gram injection     lisinopril-hydrochlorothiazide (PRINZIDE,ZESTORETIC) 20-12.5 mg per tablet Take 2 tablets by mouth  once daily    minocycline (MINOCIN,DYNACIN) 100 MG capsule Take 100 mg by mouth.     niacin 1,000 mg TbSR Take 1 tablet by mouth Daily.    NORMAL SALINE FLUSH 0.9 % injection     predniSONE (DELTASONE) 20 MG tablet     rosuvastatin (CRESTOR) 40 MG Tab Take 1 tablet (40 mg total) by mouth once daily.    SANTYL ointment     VANCOMYCIN HCL IN DEXTROSE 5 % (VANCOMYCIN IN DEXTROSE 5 %) 1 gram/200 mL PgBk     warfarin (COUMADIN) 5 MG tablet TAKE 1 AND 1/2 TABLETS BY  MOUTH ON WEDNESDAY AND  SUNDAY AND 1 TABLET ALL  REMAINING DAY AS DIRECTED  BY THE COUMADIN CLINIC.           Clinical Reference Information           Your Vitals Were     BP Pulse Temp Height Weight BMI    120/69 (BP Location: Right arm, Patient Position: Sitting, BP Method: Automatic) 96 98.7 °F (37.1 °C) (Oral) 5' 6" (1.676 m) 108.6 kg (239 lb 6.7 oz) 38.64 kg/m2      Blood Pressure          Most Recent Value    BP  120/69      Allergies as of 5/19/2017     Sulfa (Sulfonamide Antibiotics)    Sulfamethoxazole    Trimethoprim      Immunizations Administered on Date of Encounter - 5/19/2017     None      Language Assistance Services  "    ATTENTION: Language assistance services are available, free of charge. Please call [phone].      ATENCIÓN: Si habla lynn, tiene a villagran disposición servicios gratuitos de asistencia lingüística. Llame al [phone].     CHÚ Ý: N?u b?n nói Ti?ng Vi?t, có các d?ch v? h? tr? ngôn ng? mi?n phí dành cho b?n. G?i s? [phone].         [facility] complies with applicable Federal civil rights laws and does not discriminate on the basis of race, color, national origin, age, disability, or sex.

## 2017-05-19 NOTE — PROGRESS NOTES
Subjective:     Patient ID: Raymond Stuart is a 77 y.o. male.    Chief Complaint: Follow-up (Left foot ulcer(bottom ulcer-open and draining blood and clear-yellow drainage)/medial ulcer-closing and draining blood). Patient rates the current pain 1/10. ) and Diabetes Mellitus (Last PCP visit with -2/13/17. AM glucose-111 mg/dL.)    Raymond is a 77 y.o. male who presents to the clinic for evaluation and treatment of high risk feet. Raymond has a past medical history of Diabetes mellitus, type 2; Hyperlipidemia; Hypertension; Irregular heartbeat; PVD (peripheral vascular disease); S/P femoral-popliteal bypass surgery (8/8/2014); and S/P peripheral artery angioplasty (8/8/2014). The patient's chief complaint is diabetic foot ulcer, left foot. Patient states he wnet to ER as instructed and MRI was performed and there is no bone infection. Patient states he did get IV antibiotics and iis now on two oral antibiotics. Patient states his blood sugar this morning was 111mg/dl. This patient has documented high risk feet requiring routine maintenance secondary to diabetes mellitis and those secondary complications of diabetes, as mentioned..    PCP: JOHNATHON Cristobal Jr, MD    Date Last Seen by PCP: 2/13/17    Current shoe gear:  Affected Foot: Rx diabetic extra depth shoes and custom accommodative insoles     Unaffected Foot: Rx diabetic extra depth shoes and custom accommodative insoles    History of Trauma: negative  Sign of Infection: none    Hemoglobin A1C   Date Value Ref Range Status   05/16/2017 7.3 (H) 4.5 - 6.2 % Final     Comment:     According to ADA guidelines, hemoglobin A1C <7.0% represents  optimal control in non-pregnant diabetic patients.  Different  metrics may apply to specific populations.   Standards of Medical Care in Diabetes - 2016.  For the purpose of screening for the presence of diabetes:  <5.7%     Consistent with the absence of diabetes  5.7-6.4%  Consistent with increasing risk for diabetes    (prediabetes)  >or=6.5%  Consistent with diabetes  Currently no consensus exists for use of hemoglobin A1C  for diagnosis of diabetes for children.     11/09/2016 6.9 (H) 4.5 - 6.2 % Final     Comment:     According to ADA guidelines, hemoglobin A1C <7.0% represents  optimal control in non-pregnant diabetic patients.  Different  metrics may apply to specific populations.   Standards of Medical Care in Diabetes - 2016.  For the purpose of screening for the presence of diabetes:  <5.7%     Consistent with the absence of diabetes  5.7-6.4%  Consistent with increasing risk for diabetes   (prediabetes)  >or=6.5%  Consistent with diabetes  Currently no consensus exists for use of hemoglobin A1C  for diagnosis of diabetes for children.     08/10/2016 8.1 (H) 4.5 - 6.2 % Final     Comment:     According to ADA guidelines, hemoglobin A1C <7.0% represents  optimal control in non-pregnant diabetic patients.  Different  metrics may apply to specific populations.   Standards of Medical Care in Diabetes - 2016.  For the purpose of screening for the presence of diabetes:  <5.7%     Consistent with the absence of diabetes  5.7-6.4%  Consistent with increasing risk for diabetes   (prediabetes)  >or=6.5%  Consistent with diabetes  Currently no consensus exists for use of hemoglobin A1C  for diagnosis of diabetes for children.             Patient Active Problem List   Diagnosis    Hyperlipidemia associated with type 2 diabetes mellitus    Essential hypertension    A-fib    PVD (peripheral vascular disease)    Special screening for malignant neoplasms, colon    S/P femoral-popliteal bypass surgery    S/P peripheral artery angioplasty    Type 2 diabetes mellitus with microalbuminuria    Type 2 diabetes mellitus with diabetic neuropathy    Osteomyelitis of toe of left foot       Medication List with Changes/Refills   Current Medications    CEPHALEXIN (KEFLEX) 500 MG CAPSULE        DAPTOMYCIN (CUBICIN) INJECTION         DIPHENHYDRAMINE (BENADRYL) 50 MG/ML INJECTION        DOXYCYCLINE (VIBRAMYCIN) 100 MG CAP        HEPARIN, PORCINE, PF, (HEPARIN FLUSH 100 UNITS/ML) 100 UNIT/ML SYRG        HYDROCODONE-ACETAMINOPHEN 5-325MG (NORCO) 5-325 MG PER TABLET        HYDROCODONE-ACETAMINOPHEN 7.5-325MG (NORCO) 7.5-325 MG PER TABLET    Take 1 tablet by mouth every 6 (six) hours as needed for Pain.    INVANZ 1 GRAM INJECTION        MINOCYCLINE (MINOCIN,DYNACIN) 100 MG CAPSULE    Take 100 mg by mouth.     NIACIN 1,000 MG TBSR    Take 1 tablet by mouth Daily.    NORMAL SALINE FLUSH 0.9 % INJECTION        PREDNISONE (DELTASONE) 20 MG TABLET        SANTYL OINTMENT        VANCOMYCIN HCL IN DEXTROSE 5 % (VANCOMYCIN IN DEXTROSE 5 %) 1 GRAM/200 ML PGBK       Changed and/or Refilled Medications    Modified Medication Previous Medication    ATENOLOL (TENORMIN) 100 MG TABLET atenolol (TENORMIN) 100 MG tablet       Take 1 tablet (100 mg total) by mouth once daily.    Take 1 tablet by mouth  daily    CLOPIDOGREL (PLAVIX) 75 MG TABLET clopidogrel (PLAVIX) 75 mg tablet       Take 1 tablet by mouth  daily    Take 1 tablet by mouth  daily    FENOFIBRATE MICRONIZED (LOFIBRA) 134 MG CAP fenofibrate micronized (LOFIBRA) 134 MG Cap       Take 1 capsule (134 mg total) by mouth once daily.    Take 1 capsule by mouth  once daily    GABAPENTIN (NEURONTIN) 600 MG TABLET gabapentin (NEURONTIN) 600 MG tablet       Take 2 tablets (1,200 mg total) by mouth 2 (two) times daily.    Take 2 tablets (1,200 mg total) by mouth 2 (two) times daily.    GLIPIZIDE-METFORMIN (METAGLIP) 2.5-500 MG PER TABLET glipizide-metformin (METAGLIP) 2.5-500 mg per tablet       Take 2 tablets by mouth  twice a day before meals in the morning and evening    Take 2 tablets by mouth  twice a day before meals in the morning and evening    LISINOPRIL-HYDROCHLOROTHIAZIDE (PRINZIDE,ZESTORETIC) 20-12.5 MG PER TABLET lisinopril-hydrochlorothiazide (PRINZIDE,ZESTORETIC) 20-12.5 mg per tablet       Take 2  "tablets by mouth once daily.    Take 2 tablets by mouth  once daily    ROSUVASTATIN (CRESTOR) 40 MG TAB rosuvastatin (CRESTOR) 40 MG Tab       Take 1 tablet (40 mg total) by mouth once daily.    Take 1 tablet (40 mg total) by mouth once daily.    WARFARIN (COUMADIN) 5 MG TABLET warfarin (COUMADIN) 5 MG tablet       TAKE 1 AND 1/2 TABLETS BY  MOUTH ON WEDNESDAY AND  SUNDAY AND 1 TABLET ALL  REMAINING DAY AS DIRECTED  BY THE COUMADIN CLINIC.    TAKE 1 AND 1/2 TABLETS BY  MOUTH ON WEDNESDAY AND  SUNDAY AND 1 TABLET ALL  REMAINING DAY AS DIRECTED  BY THE COUMADIN CLINIC.       Review of patient's allergies indicates:   Allergen Reactions    Sulfa (sulfonamide antibiotics)      Other reaction(s): Stomach upset    Sulfamethoxazole Rash    Trimethoprim Rash     Bactrim         Past Surgical History:   Procedure Laterality Date    CARDIAC ELECTROPHYSIOLOGY MAPPING AND ABLATION      POPLITEAL ARTERY STENT  2013    RECTOPERITONEAL FISTULA CLOSURE      TOE SURGERY      Joint release    TONSILLECTOMY         History reviewed. No pertinent family history.    Social History     Social History    Marital status:      Spouse name: N/A    Number of children: N/A    Years of education: N/A     Occupational History    Retired [employer]     Social History Main Topics    Smoking status: Former Smoker     Packs/day: 4.00     Years: 30.00     Types: Cigarettes     Quit date: 1/9/1994    Smokeless tobacco: Never Used    Alcohol use No    Drug use: No    Sexual activity: Not on file     Other Topics Concern    Not on file     Social History Narrative    No narrative on file       Vitals:    05/19/17 1013   BP: 120/69   Pulse: 96   Temp: 98.7 °F (37.1 °C)   TempSrc: Oral   Weight: 108.6 kg (239 lb 6.7 oz)   Height: 5' 6" (1.676 m)   PainSc:   1   PainLoc: Foot       Hemoglobin A1C   Date Value Ref Range Status   05/16/2017 7.3 (H) 4.5 - 6.2 % Final     Comment:     According to ADA guidelines, hemoglobin " A1C <7.0% represents  optimal control in non-pregnant diabetic patients.  Different  metrics may apply to specific populations.   Standards of Medical Care in Diabetes - 2016.  For the purpose of screening for the presence of diabetes:  <5.7%     Consistent with the absence of diabetes  5.7-6.4%  Consistent with increasing risk for diabetes   (prediabetes)  >or=6.5%  Consistent with diabetes  Currently no consensus exists for use of hemoglobin A1C  for diagnosis of diabetes for children.     11/09/2016 6.9 (H) 4.5 - 6.2 % Final     Comment:     According to ADA guidelines, hemoglobin A1C <7.0% represents  optimal control in non-pregnant diabetic patients.  Different  metrics may apply to specific populations.   Standards of Medical Care in Diabetes - 2016.  For the purpose of screening for the presence of diabetes:  <5.7%     Consistent with the absence of diabetes  5.7-6.4%  Consistent with increasing risk for diabetes   (prediabetes)  >or=6.5%  Consistent with diabetes  Currently no consensus exists for use of hemoglobin A1C  for diagnosis of diabetes for children.     08/10/2016 8.1 (H) 4.5 - 6.2 % Final     Comment:     According to ADA guidelines, hemoglobin A1C <7.0% represents  optimal control in non-pregnant diabetic patients.  Different  metrics may apply to specific populations.   Standards of Medical Care in Diabetes - 2016.  For the purpose of screening for the presence of diabetes:  <5.7%     Consistent with the absence of diabetes  5.7-6.4%  Consistent with increasing risk for diabetes   (prediabetes)  >or=6.5%  Consistent with diabetes  Currently no consensus exists for use of hemoglobin A1C  for diagnosis of diabetes for children.         Review of Systems   Constitutional: Negative for chills and fever.   Respiratory: Negative for shortness of breath.    Cardiovascular: Negative for chest pain, palpitations, orthopnea, claudication and leg swelling.   Gastrointestinal: Negative for diarrhea, nausea  and vomiting.   Musculoskeletal: Negative for joint pain.   Skin: Negative for rash.   Neurological: Positive for tingling and sensory change. Negative for dizziness, focal weakness and weakness.   Endo/Heme/Allergies: Bruises/bleeds easily.   Psychiatric/Behavioral: Negative.              Objective:      PHYSICAL EXAM: Apperance: Alert and orient in no distress,well developed, and with good attention to grooming and body habits  Patient presents ambulating in diabetic shoes and inserts with dressing clean and intact to left foot.   Lower Extremity Exam  VASCULAR: Dorsalis pedis and Posterior tibial pulses palpable on left foot.  Capillary fill time <4 sec to toes on left foot.  Moderate edema observed left. Varicosities present left. Skin temperature of the lower extremities is warm to warm, proximal to distal. Hair growth absent left. (--) lymphangitis or (+) cellulitis noted left.  DERMATOLOGICAL: (+) decreased edema, (+) decreased erythema, (--) malodor, (+) decreased serosanginous drainage, (+) decreased warmth to left foot.  Ulcer noted to left medial and plantar hallux  with fibrotic/granular base. Ulcer measurement  0.5cm x 0.5cm x 0.1cm,0.3cm x 0.1cm, 1cm x 1cmx0.5cmThe ulcer does not extend into deeper tissue and (+) sinus tracts exist.  The dorsum surface of the feet are soft and supple.  The plantar aspects of both feet are dry and scaly. Webspaces 1-3 clean, dry and without evidence of break in skin integrity left.   NEUROLOGICAL: Light touch, sharp-dull, proprioception all present and equal bilaterally.  Protective sensation absent sites as tested with a Kinney-Jabari 5.07 monofilament.   MUSCULOSKELETAL: Muscle strength is 5/5 for foot inverters, everters, plantarflexors, and dorsiflexors. Muscle tone is normal.  Inspection/palpation of bone, joints and muscles unremarkable with the exception of that noted above. 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                 Assessment:       Encounter Diagnoses   Name  Primary?   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   With patient's permission, the wound was irrigated with sterile saline and bleeding was controlled with direct pressure. Minimal blood loss. The patient tolerated this well. Wound was then dressed with Collagenase, gauze, kerlix, and papertape. Patient was given instructions on daily dressing changes. Patient was also instructed on the importance of keeping the wound and dressings clean dry and intact and keeping pressure off the wound area until complete healing of the wound.The patient is alerted to watch for any signs of infection (redness, pus, pain, increased swelling or fever) and call if such occurs. Home wound care instructions are provided.  Patient to continue antibiotics as prescribed.   Patient to return in 1 week or sooner if needed.             Nic Castillo DPM  [facility]

## 2017-05-22 ENCOUNTER — OFFICE VISIT (OUTPATIENT)
Dept: INTERNAL MEDICINE | Facility: CLINIC | Age: 77
End: 2017-05-22
Payer: COMMERCIAL

## 2017-05-22 VITALS
SYSTOLIC BLOOD PRESSURE: 138 MMHG | OXYGEN SATURATION: 98 % | TEMPERATURE: 97 F | WEIGHT: 237.88 LBS | BODY MASS INDEX: 38.23 KG/M2 | HEART RATE: 78 BPM | DIASTOLIC BLOOD PRESSURE: 78 MMHG | HEIGHT: 66 IN

## 2017-05-22 DIAGNOSIS — I73.9 PVD (PERIPHERAL VASCULAR DISEASE): ICD-10-CM

## 2017-05-22 DIAGNOSIS — R80.9 TYPE 2 DIABETES MELLITUS WITH MICROALBUMINURIA, WITHOUT LONG-TERM CURRENT USE OF INSULIN: ICD-10-CM

## 2017-05-22 DIAGNOSIS — E11.40 TYPE 2 DIABETES MELLITUS WITH DIABETIC NEUROPATHY, WITHOUT LONG-TERM CURRENT USE OF INSULIN: Primary | ICD-10-CM

## 2017-05-22 DIAGNOSIS — I48.21 PERMANENT ATRIAL FIBRILLATION: ICD-10-CM

## 2017-05-22 DIAGNOSIS — M86.9 OSTEOMYELITIS OF TOE OF LEFT FOOT: ICD-10-CM

## 2017-05-22 DIAGNOSIS — E78.5 HYPERLIPIDEMIA ASSOCIATED WITH TYPE 2 DIABETES MELLITUS: ICD-10-CM

## 2017-05-22 DIAGNOSIS — E11.69 HYPERLIPIDEMIA ASSOCIATED WITH TYPE 2 DIABETES MELLITUS: ICD-10-CM

## 2017-05-22 DIAGNOSIS — E11.29 TYPE 2 DIABETES MELLITUS WITH MICROALBUMINURIA, WITHOUT LONG-TERM CURRENT USE OF INSULIN: ICD-10-CM

## 2017-05-22 DIAGNOSIS — I10 ESSENTIAL HYPERTENSION: ICD-10-CM

## 2017-05-22 LAB — INR PPP: 2.3

## 2017-05-22 PROCEDURE — 3078F DIAST BP <80 MM HG: CPT | Mod: S$GLB,,, | Performed by: PEDIATRICS

## 2017-05-22 PROCEDURE — 1160F RVW MEDS BY RX/DR IN RCRD: CPT | Mod: S$GLB,,, | Performed by: PEDIATRICS

## 2017-05-22 PROCEDURE — 1125F AMNT PAIN NOTED PAIN PRSNT: CPT | Mod: S$GLB,,, | Performed by: PEDIATRICS

## 2017-05-22 PROCEDURE — 3045F PR MOST RECENT HEMOGLOBIN A1C LEVEL 7.0-9.0%: CPT | Mod: S$GLB,,, | Performed by: PEDIATRICS

## 2017-05-22 PROCEDURE — 99214 OFFICE O/P EST MOD 30 MIN: CPT | Mod: S$GLB,,, | Performed by: PEDIATRICS

## 2017-05-22 PROCEDURE — 1157F ADVNC CARE PLAN IN RCRD: CPT | Mod: 8P,S$GLB,, | Performed by: PEDIATRICS

## 2017-05-22 PROCEDURE — 1159F MED LIST DOCD IN RCRD: CPT | Mod: S$GLB,,, | Performed by: PEDIATRICS

## 2017-05-22 PROCEDURE — 4010F ACE/ARB THERAPY RXD/TAKEN: CPT | Mod: S$GLB,,, | Performed by: PEDIATRICS

## 2017-05-22 PROCEDURE — 99999 PR PBB SHADOW E&M-EST. PATIENT-LVL III: CPT | Mod: PBBFAC,,, | Performed by: PEDIATRICS

## 2017-05-22 PROCEDURE — 3075F SYST BP GE 130 - 139MM HG: CPT | Mod: S$GLB,,, | Performed by: PEDIATRICS

## 2017-05-22 RX ORDER — GABAPENTIN 600 MG/1
1200 TABLET ORAL 2 TIMES DAILY
Qty: 180 TABLET | Refills: 3 | Status: SHIPPED | OUTPATIENT
Start: 2017-05-22 | End: 2017-09-29 | Stop reason: SDUPTHER

## 2017-05-22 RX ORDER — GLIPIZIDE AND METFORMIN HCL 2.5; 5 MG/1; MG/1
TABLET, FILM COATED ORAL
Qty: 360 TABLET | Refills: 3 | Status: SHIPPED | OUTPATIENT
Start: 2017-05-22 | End: 2018-08-16 | Stop reason: SDUPTHER

## 2017-05-22 RX ORDER — ATENOLOL 100 MG/1
100 TABLET ORAL DAILY
Qty: 90 TABLET | Refills: 3 | Status: SHIPPED | OUTPATIENT
Start: 2017-05-22 | End: 2018-08-16 | Stop reason: SDUPTHER

## 2017-05-22 RX ORDER — FENOFIBRATE 134 MG/1
134 CAPSULE ORAL DAILY
Qty: 90 CAPSULE | Refills: 3 | Status: SHIPPED | OUTPATIENT
Start: 2017-05-22

## 2017-05-22 RX ORDER — WARFARIN SODIUM 5 MG/1
TABLET ORAL
Qty: 96 TABLET | Refills: 3 | Status: SHIPPED | OUTPATIENT
Start: 2017-05-22 | End: 2018-08-16 | Stop reason: SDUPTHER

## 2017-05-22 RX ORDER — LISINOPRIL AND HYDROCHLOROTHIAZIDE 12.5; 2 MG/1; MG/1
2 TABLET ORAL DAILY
Qty: 180 TABLET | Refills: 3 | Status: SHIPPED | OUTPATIENT
Start: 2017-05-22 | End: 2018-09-18 | Stop reason: SDUPTHER

## 2017-05-22 RX ORDER — ROSUVASTATIN CALCIUM 40 MG/1
40 TABLET, COATED ORAL DAILY
Qty: 90 TABLET | Refills: 3 | Status: SHIPPED | OUTPATIENT
Start: 2017-05-22 | End: 2018-08-16 | Stop reason: SDUPTHER

## 2017-05-22 RX ORDER — CLOPIDOGREL BISULFATE 75 MG/1
TABLET ORAL
Qty: 90 TABLET | Refills: 3 | Status: SHIPPED | OUTPATIENT
Start: 2017-05-22 | End: 2018-08-16 | Stop reason: SDUPTHER

## 2017-05-22 NOTE — PROGRESS NOTES
Subjective:     Patient ID: Raymond Stuart is a 77 y.o. male.    Chief Complaint: Foot Ulcer (Left foot (medial side-swollen, red, and draining clear fluid). Patient states no current pain but pain does occur sometimes. ) and Diabetes Mellitus (138 m/dL-today's glucose. Last PCP visit with -2/13/17.)    Raymond is a 77 y.o. male who presents to the clinic for evaluation and treatment of high risk feet. Raymond has a past medical history of Diabetes mellitus, type 2; Hyperlipidemia; Hypertension; Irregular heartbeat; PVD (peripheral vascular disease); S/P femoral-popliteal bypass surgery (8/8/2014); and S/P peripheral artery angioplasty (8/8/2014). The patient's chief complaint is diabetic foot ulcer, left foot. Patient states left foot became red and swollen on Wednesday. Patient states home health came out Monday and foot was fine. Patient states he was seen by Dr. Wu yesterday and prescribed antibiotics. Patient states his blood sugar this morning was 138mg/dl. Patient's daughter states patient is to follow up with Dr. Wu on Monday.  This patient has documented high risk feet requiring routine maintenance secondary to peripheral vascular disease.    PCP: JOHNATHON Cristobal Jr, MD    Date Last Seen by PCP: 2/13/17    Current shoe gear:  Affected Foot: Rx diabetic extra depth shoes and custom accommodative insoles     Unaffected Foot: Rx diabetic extra depth shoes and custom accommodative insoles    History of Trauma: negative  Sign of Infection: fever    Hemoglobin A1C   Date Value Ref Range Status   05/16/2017 7.3 (H) 4.5 - 6.2 % Final     Comment:     According to ADA guidelines, hemoglobin A1C <7.0% represents  optimal control in non-pregnant diabetic patients.  Different  metrics may apply to specific populations.   Standards of Medical Care in Diabetes - 2016.  For the purpose of screening for the presence of diabetes:  <5.7%     Consistent with the absence of diabetes  5.7-6.4%  Consistent with  increasing risk for diabetes   (prediabetes)  >or=6.5%  Consistent with diabetes  Currently no consensus exists for use of hemoglobin A1C  for diagnosis of diabetes for children.     11/09/2016 6.9 (H) 4.5 - 6.2 % Final     Comment:     According to ADA guidelines, hemoglobin A1C <7.0% represents  optimal control in non-pregnant diabetic patients.  Different  metrics may apply to specific populations.   Standards of Medical Care in Diabetes - 2016.  For the purpose of screening for the presence of diabetes:  <5.7%     Consistent with the absence of diabetes  5.7-6.4%  Consistent with increasing risk for diabetes   (prediabetes)  >or=6.5%  Consistent with diabetes  Currently no consensus exists for use of hemoglobin A1C  for diagnosis of diabetes for children.     08/10/2016 8.1 (H) 4.5 - 6.2 % Final     Comment:     According to ADA guidelines, hemoglobin A1C <7.0% represents  optimal control in non-pregnant diabetic patients.  Different  metrics may apply to specific populations.   Standards of Medical Care in Diabetes - 2016.  For the purpose of screening for the presence of diabetes:  <5.7%     Consistent with the absence of diabetes  5.7-6.4%  Consistent with increasing risk for diabetes   (prediabetes)  >or=6.5%  Consistent with diabetes  Currently no consensus exists for use of hemoglobin A1C  for diagnosis of diabetes for children.             Patient Active Problem List   Diagnosis    Hyperlipidemia associated with type 2 diabetes mellitus    Essential hypertension    A-fib    PVD (peripheral vascular disease)    Special screening for malignant neoplasms, colon    S/P femoral-popliteal bypass surgery    S/P peripheral artery angioplasty    Type 2 diabetes mellitus with microalbuminuria    Type 2 diabetes mellitus with diabetic neuropathy    Osteomyelitis of toe of left foot       Medication List with Changes/Refills   Current Medications    ATENOLOL (TENORMIN) 100 MG TABLET    Take 1 tablet by  mouth  daily    CEPHALEXIN (KEFLEX) 500 MG CAPSULE        CLOPIDOGREL (PLAVIX) 75 MG TABLET    Take 1 tablet by mouth  daily    DAPTOMYCIN (CUBICIN) INJECTION        DIPHENHYDRAMINE (BENADRYL) 50 MG/ML INJECTION        DOXYCYCLINE (VIBRAMYCIN) 100 MG CAP        FENOFIBRATE MICRONIZED (LOFIBRA) 134 MG CAP    Take 1 capsule by mouth  once daily    GABAPENTIN (NEURONTIN) 600 MG TABLET    Take 2 tablets (1,200 mg total) by mouth 2 (two) times daily.    GLIPIZIDE-METFORMIN (METAGLIP) 2.5-500 MG PER TABLET    Take 2 tablets by mouth  twice a day before meals in the morning and evening    HEPARIN, PORCINE, PF, (HEPARIN FLUSH 100 UNITS/ML) 100 UNIT/ML SYRG        HYDROCODONE-ACETAMINOPHEN 5-325MG (NORCO) 5-325 MG PER TABLET        HYDROCODONE-ACETAMINOPHEN 7.5-325MG (NORCO) 7.5-325 MG PER TABLET    Take 1 tablet by mouth every 6 (six) hours as needed for Pain.    INVANZ 1 GRAM INJECTION        LISINOPRIL-HYDROCHLOROTHIAZIDE (PRINZIDE,ZESTORETIC) 20-12.5 MG PER TABLET    Take 2 tablets by mouth  once daily    MINOCYCLINE (MINOCIN,DYNACIN) 100 MG CAPSULE    Take 100 mg by mouth.     NIACIN 1,000 MG TBSR    Take 1 tablet by mouth Daily.    NORMAL SALINE FLUSH 0.9 % INJECTION        PREDNISONE (DELTASONE) 20 MG TABLET        ROSUVASTATIN (CRESTOR) 40 MG TAB    Take 1 tablet (40 mg total) by mouth once daily.    SANTYL OINTMENT        VANCOMYCIN HCL IN DEXTROSE 5 % (VANCOMYCIN IN DEXTROSE 5 %) 1 GRAM/200 ML PGBK        WARFARIN (COUMADIN) 5 MG TABLET    TAKE 1 AND 1/2 TABLETS BY  MOUTH ON WEDNESDAY AND  SUNDAY AND 1 TABLET ALL  REMAINING DAY AS DIRECTED  BY THE COUMADIN CLINIC.       Review of patient's allergies indicates:   Allergen Reactions    Sulfa (sulfonamide antibiotics)      Other reaction(s): Stomach upset    Sulfamethoxazole Rash    Trimethoprim Rash     Bactrim         Past Surgical History:   Procedure Laterality Date    CARDIAC ELECTROPHYSIOLOGY MAPPING AND ABLATION      POPLITEAL ARTERY STENT  2013     "RECTOPERITONEAL FISTULA CLOSURE      TOE SURGERY      Joint release    TONSILLECTOMY         History reviewed. No pertinent family history.    Social History     Social History    Marital status:      Spouse name: N/A    Number of children: N/A    Years of education: N/A     Occupational History    Retired [employer]     Social History Main Topics    Smoking status: Former Smoker     Packs/day: 4.00     Years: 30.00     Types: Cigarettes     Quit date: 1/9/1994    Smokeless tobacco: Never Used    Alcohol use No    Drug use: No    Sexual activity: Not on file     Other Topics Concern    Not on file     Social History Narrative    No narrative on file       Vitals:    05/12/17 1601   BP: (!) 148/85   Pulse: 107   Temp: 99 °F (37.2 °C)   TempSrc: Oral   Weight: 108.6 kg (239 lb 6.7 oz)   Height: 5' 6" (1.676 m)       Hemoglobin A1C   Date Value Ref Range Status   05/16/2017 7.3 (H) 4.5 - 6.2 % Final     Comment:     According to ADA guidelines, hemoglobin A1C <7.0% represents  optimal control in non-pregnant diabetic patients.  Different  metrics may apply to specific populations.   Standards of Medical Care in Diabetes - 2016.  For the purpose of screening for the presence of diabetes:  <5.7%     Consistent with the absence of diabetes  5.7-6.4%  Consistent with increasing risk for diabetes   (prediabetes)  >or=6.5%  Consistent with diabetes  Currently no consensus exists for use of hemoglobin A1C  for diagnosis of diabetes for children.     11/09/2016 6.9 (H) 4.5 - 6.2 % Final     Comment:     According to ADA guidelines, hemoglobin A1C <7.0% represents  optimal control in non-pregnant diabetic patients.  Different  metrics may apply to specific populations.   Standards of Medical Care in Diabetes - 2016.  For the purpose of screening for the presence of diabetes:  <5.7%     Consistent with the absence of diabetes  5.7-6.4%  Consistent with increasing risk for diabetes "   (prediabetes)  >or=6.5%  Consistent with diabetes  Currently no consensus exists for use of hemoglobin A1C  for diagnosis of diabetes for children.     08/10/2016 8.1 (H) 4.5 - 6.2 % Final     Comment:     According to ADA guidelines, hemoglobin A1C <7.0% represents  optimal control in non-pregnant diabetic patients.  Different  metrics may apply to specific populations.   Standards of Medical Care in Diabetes - 2016.  For the purpose of screening for the presence of diabetes:  <5.7%     Consistent with the absence of diabetes  5.7-6.4%  Consistent with increasing risk for diabetes   (prediabetes)  >or=6.5%  Consistent with diabetes  Currently no consensus exists for use of hemoglobin A1C  for diagnosis of diabetes for children.         Review of Systems   Constitutional: Positive for fever. Negative for chills.   Respiratory: Negative for shortness of breath.    Cardiovascular: Negative for chest pain, palpitations, orthopnea, claudication and leg swelling.   Gastrointestinal: Negative for diarrhea, nausea and vomiting.   Musculoskeletal: Negative for joint pain.   Skin: Negative for rash.   Neurological: Negative for dizziness, tingling, sensory change, focal weakness and weakness.   Psychiatric/Behavioral: Negative.              Objective:      PHYSICAL EXAM: Apperance: Alert and orient in no distress,well developed, and with good attention to grooming and body habits  Patient presents ambulating in diabetic shoes and inserts with dressing clean and intact to left foot.   Lower Extremity Exam  VASCULAR: Dorsalis pedis and Posterior tibial pulses palpable on left foot.  Capillary fill time <4 sec to toes on left foot.  Moderate edema observed left. Varicosities present left. Skin temperature of the lower extremities is warm to warm, proximal to distal. Hair growth absent left. (--) lymphangitis or (+) cellulitis noted left.  DERMATOLOGICAL: (+) edema, (+) erythema, (--) malodor, (+) serosanginous drainage, (+)  warmth to left foot.  Ulcer noted to left medial and plantar hallux  with fibrotic/granular base. Ulcer measurement  1cm x 1cm x 0.5cm,0.5cm x 0.5cm, 1cm x 1.3cm.The ulcer does not extend into deeper tissue and (+) sinus tracts exist.  The dorsum surface of the feet are soft and supple.  The plantar aspects of both feet are dry and scaly. Webspaces 1-3 clean, dry and without evidence of break in skin integrity left.   NEUROLOGICAL: Light touch, sharp-dull, proprioception all present and equal bilaterally.  Protective sensation absent sites as tested with a [place] 5.07 monofilament.   MUSCULOSKELETAL: Muscle strength is 5/5 for foot inverters, everters, plantarflexors, and dorsiflexors. Muscle tone is normal.  Inspection/palpation of bone, joints and muscles unremarkable with the exception of that noted above. 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                 TEST RESULTS: Radiographs of  Left foot reveals There has been amputation of the 5th digit with probable partial amputation of the 5th metatarsal head that is similar in appearance to prior exam.  Vascular calcifications are noted.  Mild degenerative changes noted at the interphalangeal joints and at the level of the mid foot.  Hammertoe deformities of the 2nd, 3rd and 4th digits noted.      Assessment:       Encounter Diagnoses   Name Primary?   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   Reviewed x-rays in exam room with patient.   Discussed possible abscess foot infection and instructed/recommended patient to report to ER  for MRI testing and IV antibiotics.   With patient's permission, the wound was irrigated with sterile saline and bleeding was controlled with direct pressure. Minimal blood loss. The patient tolerated this well. Wound was then dressed with Collagenase, gauze, kerlix, and papertape. Patient was given instructions on daily dressing changes. Patient was also instructed on the importance of keeping the wound and dressings clean dry and intact and keeping pressure off the wound area until complete healing of the wound.The patient is alerted to watch for any signs of infection (redness, pus, pain, increased swelling or fever) and call if such occurs. Home wound care instructions are provided.  Patient to continue antibiotics as prescribed.   Patient to call if not admitted to hospital.               Nic Castillo DPM  [facility]

## 2017-05-22 NOTE — PROGRESS NOTES
Subjective:       Patient ID: Raymond Stuart is a 77 y.o. male.    Chief Complaint: Annual Exam (6mo/lab)    HTN: B/P good, no HTNive symptoms.    DM: no hyper/hypoglycemic symptoms. Am self monitoring BS- .  LIPIDS:somewhat following D&E, tolerating and compliant with med(s).    PVD/cardio: followed by Dr Judge and Dr Flores. Will have upcoming revasculation.  DM foot ulcer: seeing Dr Castillo and ID. On IV antibioticw. He reports that ID has requested he stay of lipid meds for antibiotics.     LABS REVIEWED AND DISCUSSED WITH PATIENT       Review of Systems   Constitutional: Negative for fever and unexpected weight change.   HENT: Negative for congestion and rhinorrhea.    Eyes: Negative for discharge and redness.   Respiratory: Negative for cough and wheezing.    Gastrointestinal: Negative for constipation, diarrhea and vomiting.   Genitourinary: Negative for decreased urine volume and difficulty urinating.   Musculoskeletal: Negative for arthralgias and joint swelling.   Skin: Negative for rash and wound.   Neurological: Negative for syncope and headaches.   Psychiatric/Behavioral: Negative for behavioral problems and sleep disturbance.       Objective:      Physical Exam   Constitutional: He is oriented to person, place, and time. He appears well-developed and well-nourished. No distress.   Neck: Normal range of motion. No JVD present. No thyromegaly present.   Cardiovascular: Normal heart sounds.    No murmur heard.  Rate controlled but irregular.   Pulmonary/Chest: Effort normal and breath sounds normal. No respiratory distress. He has no wheezes. He has no rales.   Abdominal: Soft. He exhibits no distension and no mass. There is no tenderness. There is no rebound and no guarding. No hernia.   Musculoskeletal: He exhibits edema (trace).   Right upper arm iv cath. Distal pulses not palpable.   Lymphadenopathy:     He has no cervical adenopathy.   Neurological: He is alert and oriented to person, place, and  time. No cranial nerve deficit. Coordination normal.   Skin: Capillary refill takes less than 2 seconds. No rash noted.   Psychiatric: He has a normal mood and affect. His behavior is normal. Judgment and thought content normal.       Assessment:       1. Type 2 diabetes mellitus with diabetic neuropathy, without long-term current use of insulin    2. Essential hypertension    3. Hyperlipidemia associated with type 2 diabetes mellitus    4. Permanent atrial fibrillation    5. PVD (peripheral vascular disease)    6. Osteomyelitis of toe of left foot    7. Type 2 diabetes mellitus with microalbuminuria, without long-term current use of insulin        Plan:       Type 2 diabetes mellitus with diabetic neuropathy, without long-term current use of insulin  -     Basic metabolic panel; Future; Expected date: 05/22/2017  -     Hemoglobin A1c; Future; Expected date: 05/22/2017  -     Microalbumin/creatinine urine ratio; Future; Expected date: 05/22/2017    Essential hypertension  -     atenolol (TENORMIN) 100 MG tablet; Take 1 tablet (100 mg total) by mouth once daily.  Dispense: 90 tablet; Refill: 3  -     lisinopril-hydrochlorothiazide (PRINZIDE,ZESTORETIC) 20-12.5 mg per tablet; Take 2 tablets by mouth once daily.  Dispense: 180 tablet; Refill: 3    Hyperlipidemia associated with type 2 diabetes mellitus  -     AST (SGOT); Future; Expected date: 05/22/2017  -     ALT (SGPT); Future; Expected date: 05/22/2017  -     Lipid panel; Future; Expected date: 05/22/2017  -     fenofibrate micronized (LOFIBRA) 134 MG Cap; Take 1 capsule (134 mg total) by mouth once daily.  Dispense: 90 capsule; Refill: 3  -     rosuvastatin (CRESTOR) 40 MG Tab; Take 1 tablet (40 mg total) by mouth once daily.  Dispense: 90 tablet; Refill: 3    Permanent atrial fibrillation    PVD (peripheral vascular disease)  -     clopidogrel (PLAVIX) 75 mg tablet; Take 1 tablet by mouth  daily  Dispense: 90 tablet; Refill: 3  -     warfarin (COUMADIN) 5 MG  tablet; TAKE 1 AND 1/2 TABLETS BY  MOUTH ON WEDNESDAY AND  SUNDAY AND 1 TABLET ALL  REMAINING DAY AS DIRECTED  BY THE COUMADIN CLINIC.  Dispense: 96 tablet; Refill: 3    Osteomyelitis of toe of left foot    Type 2 diabetes mellitus with microalbuminuria, without long-term current use of insulin  -     glipizide-metformin (METAGLIP) 2.5-500 mg per tablet; Take 2 tablets by mouth  twice a day before meals in the morning and evening  Dispense: 360 tablet; Refill: 3  -     lisinopril-hydrochlorothiazide (PRINZIDE,ZESTORETIC) 20-12.5 mg per tablet; Take 2 tablets by mouth once daily.  Dispense: 180 tablet; Refill: 3    Other orders  -     gabapentin (NEURONTIN) 600 MG tablet; Take 2 tablets (1,200 mg total) by mouth 2 (two) times daily.  Dispense: 180 tablet; Refill: 3    Hold fenofibrate and crestor until ID says he can restart(the two were reordered so he can restart when cleared).  Meds reviewed, Keep subspecialty care, self monitor B/P and BS. F/U 6 months with labs.

## 2017-05-23 ENCOUNTER — ANTI-COAG VISIT (OUTPATIENT)
Dept: CARDIOLOGY | Facility: CLINIC | Age: 77
End: 2017-05-23

## 2017-05-23 NOTE — PROGRESS NOTES
Faxed result taken from __Kika/Kami _______. PT/INR _25.1/2.3_____ Date drawn___5/22____ continue dose, repeat INR in 3 weeks. Orders faxed.

## 2017-05-26 ENCOUNTER — OFFICE VISIT (OUTPATIENT)
Dept: PODIATRY | Facility: CLINIC | Age: 77
End: 2017-05-26
Payer: COMMERCIAL

## 2017-05-26 VITALS
BODY MASS INDEX: 39.18 KG/M2 | DIASTOLIC BLOOD PRESSURE: 71 MMHG | HEART RATE: 104 BPM | HEIGHT: 66 IN | SYSTOLIC BLOOD PRESSURE: 119 MMHG | WEIGHT: 243.81 LBS

## 2017-05-26 DIAGNOSIS — L98.492 NEUROPATHIC ULCER, WITH FAT LAYER EXPOSED: Primary | ICD-10-CM

## 2017-05-26 DIAGNOSIS — E11.51 TYPE II DIABETES MELLITUS WITH PERIPHERAL CIRCULATORY DISORDER: ICD-10-CM

## 2017-05-26 PROCEDURE — 99499 UNLISTED E&M SERVICE: CPT | Mod: S$GLB,,, | Performed by: PODIATRIST

## 2017-05-26 PROCEDURE — 99999 PR PBB SHADOW E&M-EST. PATIENT-LVL III: CPT | Mod: PBBFAC,,, | Performed by: PODIATRIST

## 2017-05-26 PROCEDURE — 11042 DBRDMT SUBQ TIS 1ST 20SQCM/<: CPT | Mod: S$GLB,,, | Performed by: PODIATRIST

## 2017-06-05 NOTE — PROGRESS NOTES
Subjective:     Patient ID: Raymond Stuart is a 77 y.o. male.    Chief Complaint: Follow-up (Left foot ulcers (open and draining clear drainage). Patient rates the current pain 1/10. ) and Diabetes Mellitus (128 mg/dL-today's glucose. Last PCP visit with -2/13/17.)    Raymond is a 77 y.o. male who presents to the clinic for evaluation and treatment of high risk feet. Raymond has a past medical history of Diabetes mellitus, type 2; Hyperlipidemia; Hypertension; Irregular heartbeat; PVD (peripheral vascular disease); S/P femoral-popliteal bypass surgery (8/8/2014); and S/P peripheral artery angioplasty (8/8/2014). The patient's chief complaint is diabetic foot ulcer, left foot. Patient states he is taking oral antibiotics. Patient states his blood sugar this morning was 128mg/dl. This patient has documented high risk feet requiring routine maintenance secondary to diabetes mellitis and those secondary complications of diabetes, as mentioned..    PCP: JOHNATHON Cristobal Jr, MD    Date Last Seen by PCP: 2/13/17    Current shoe gear:  Affected Foot: Rx diabetic extra depth shoes and custom accommodative insoles     Unaffected Foot: Rx diabetic extra depth shoes and custom accommodative insoles    History of Trauma: negative  Sign of Infection: none    Hemoglobin A1C   Date Value Ref Range Status   05/16/2017 7.3 (H) 4.5 - 6.2 % Final     Comment:     According to ADA guidelines, hemoglobin A1C <7.0% represents  optimal control in non-pregnant diabetic patients.  Different  metrics may apply to specific populations.   Standards of Medical Care in Diabetes - 2016.  For the purpose of screening for the presence of diabetes:  <5.7%     Consistent with the absence of diabetes  5.7-6.4%  Consistent with increasing risk for diabetes   (prediabetes)  >or=6.5%  Consistent with diabetes  Currently no consensus exists for use of hemoglobin A1C  for diagnosis of diabetes for children.     11/09/2016 6.9 (H) 4.5 - 6.2 % Final      Comment:     According to ADA guidelines, hemoglobin A1C <7.0% represents  optimal control in non-pregnant diabetic patients.  Different  metrics may apply to specific populations.   Standards of Medical Care in Diabetes - 2016.  For the purpose of screening for the presence of diabetes:  <5.7%     Consistent with the absence of diabetes  5.7-6.4%  Consistent with increasing risk for diabetes   (prediabetes)  >or=6.5%  Consistent with diabetes  Currently no consensus exists for use of hemoglobin A1C  for diagnosis of diabetes for children.     08/10/2016 8.1 (H) 4.5 - 6.2 % Final     Comment:     According to ADA guidelines, hemoglobin A1C <7.0% represents  optimal control in non-pregnant diabetic patients.  Different  metrics may apply to specific populations.   Standards of Medical Care in Diabetes - 2016.  For the purpose of screening for the presence of diabetes:  <5.7%     Consistent with the absence of diabetes  5.7-6.4%  Consistent with increasing risk for diabetes   (prediabetes)  >or=6.5%  Consistent with diabetes  Currently no consensus exists for use of hemoglobin A1C  for diagnosis of diabetes for children.             Patient Active Problem List   Diagnosis    Hyperlipidemia associated with type 2 diabetes mellitus    Essential hypertension    A-fib    PVD (peripheral vascular disease)    Special screening for malignant neoplasms, colon    S/P femoral-popliteal bypass surgery    S/P peripheral artery angioplasty    Type 2 diabetes mellitus with microalbuminuria    Type 2 diabetes mellitus with diabetic neuropathy    Osteomyelitis of toe of left foot       Medication List with Changes/Refills   Current Medications    ATENOLOL (TENORMIN) 100 MG TABLET    Take 1 tablet (100 mg total) by mouth once daily.    CEPHALEXIN (KEFLEX) 500 MG CAPSULE        CLOPIDOGREL (PLAVIX) 75 MG TABLET    Take 1 tablet by mouth  daily    DAPTOMYCIN (CUBICIN) INJECTION        DIPHENHYDRAMINE (BENADRYL) 50 MG/ML  INJECTION        DOXYCYCLINE (VIBRAMYCIN) 100 MG CAP        FENOFIBRATE MICRONIZED (LOFIBRA) 134 MG CAP    Take 1 capsule (134 mg total) by mouth once daily.    GABAPENTIN (NEURONTIN) 600 MG TABLET    Take 2 tablets (1,200 mg total) by mouth 2 (two) times daily.    GLIPIZIDE-METFORMIN (METAGLIP) 2.5-500 MG PER TABLET    Take 2 tablets by mouth  twice a day before meals in the morning and evening    HEPARIN, PORCINE, PF, (HEPARIN FLUSH 100 UNITS/ML) 100 UNIT/ML SYRG        HYDROCODONE-ACETAMINOPHEN 5-325MG (NORCO) 5-325 MG PER TABLET        HYDROCODONE-ACETAMINOPHEN 7.5-325MG (NORCO) 7.5-325 MG PER TABLET    Take 1 tablet by mouth every 6 (six) hours as needed for Pain.    INVANZ 1 GRAM INJECTION        LISINOPRIL-HYDROCHLOROTHIAZIDE (PRINZIDE,ZESTORETIC) 20-12.5 MG PER TABLET    Take 2 tablets by mouth once daily.    MINOCYCLINE (MINOCIN,DYNACIN) 100 MG CAPSULE    Take 100 mg by mouth.     NIACIN 1,000 MG TBSR    Take 1 tablet by mouth Daily.    NORMAL SALINE FLUSH 0.9 % INJECTION        PREDNISONE (DELTASONE) 20 MG TABLET        ROSUVASTATIN (CRESTOR) 40 MG TAB    Take 1 tablet (40 mg total) by mouth once daily.    SANTYL OINTMENT        VANCOMYCIN HCL IN DEXTROSE 5 % (VANCOMYCIN IN DEXTROSE 5 %) 1 GRAM/200 ML PGBK        WARFARIN (COUMADIN) 5 MG TABLET    TAKE 1 AND 1/2 TABLETS BY  MOUTH ON WEDNESDAY AND  SUNDAY AND 1 TABLET ALL  REMAINING DAY AS DIRECTED  BY THE [facility]IN CLINIC.       Review of patient's allergies indicates:   Allergen Reactions    Sulfa (sulfonamide antibiotics)      Other reaction(s): Stomach upset    Sulfamethoxazole Rash    Trimethoprim Rash     Bactrim         Past Surgical History:   Procedure Laterality Date    CARDIAC ELECTROPHYSIOLOGY MAPPING AND ABLATION      POPLITEAL ARTERY STENT  2013    RECTOPERITONEAL FISTULA CLOSURE      TOE SURGERY      Joint release    TONSILLECTOMY         History reviewed. No pertinent family history.    Social History     Social History    Marital  "status:      Spouse name: N/A    Number of children: N/A    Years of education: N/A     Occupational History    Retired       [employer]     Social History Main Topics    Smoking status: Former Smoker     Packs/day: 4.00     Years: 30.00     Types: Cigarettes     Quit date: 1/9/1994    Smokeless tobacco: Never Used    Alcohol use No    Drug use: No    Sexual activity: Not on file     Other Topics Concern    Not on file     Social History Narrative    No narrative on file       Vitals:    05/26/17 1431   BP: 119/71   Pulse: 104   Weight: 110.6 kg (243 lb 13.3 oz)   Height: 5' 6" (1.676 m)   PainSc:   1   PainLoc: Foot       Hemoglobin A1C   Date Value Ref Range Status   05/16/2017 7.3 (H) 4.5 - 6.2 % Final     Comment:     According to ADA guidelines, hemoglobin A1C <7.0% represents  optimal control in non-pregnant diabetic patients.  Different  metrics may apply to specific populations.   Standards of Medical Care in Diabetes - 2016.  For the purpose of screening for the presence of diabetes:  <5.7%     Consistent with the absence of diabetes  5.7-6.4%  Consistent with increasing risk for diabetes   (prediabetes)  >or=6.5%  Consistent with diabetes  Currently no consensus exists for use of hemoglobin A1C  for diagnosis of diabetes for children.     11/09/2016 6.9 (H) 4.5 - 6.2 % Final     Comment:     According to ADA guidelines, hemoglobin A1C <7.0% represents  optimal control in non-pregnant diabetic patients.  Different  metrics may apply to specific populations.   Standards of Medical Care in Diabetes - 2016.  For the purpose of screening for the presence of diabetes:  <5.7%     Consistent with the absence of diabetes  5.7-6.4%  Consistent with increasing risk for diabetes   (prediabetes)  >or=6.5%  Consistent with diabetes  Currently no consensus exists for use of hemoglobin A1C  for diagnosis of diabetes for children.     08/10/2016 8.1 (H) 4.5 - 6.2 % Final     Comment:     According to " ADA guidelines, hemoglobin A1C <7.0% represents  optimal control in non-pregnant diabetic patients.  Different  metrics may apply to specific populations.   Standards of Medical Care in Diabetes - 2016.  For the purpose of screening for the presence of diabetes:  <5.7%     Consistent with the absence of diabetes  5.7-6.4%  Consistent with increasing risk for diabetes   (prediabetes)  >or=6.5%  Consistent with diabetes  Currently no consensus exists for use of hemoglobin A1C  for diagnosis of diabetes for children.         Review of Systems   Constitutional: Negative for chills and fever.   Respiratory: Negative for shortness of breath.    Cardiovascular: Negative for chest pain, palpitations, orthopnea, claudication and leg swelling.   Gastrointestinal: Negative for diarrhea, nausea and vomiting.   Musculoskeletal: Negative for joint pain.   Skin: Negative for rash.   Neurological: Positive for tingling and sensory change. Negative for dizziness, focal weakness and weakness.   Endo/Heme/Allergies: Bruises/bleeds easily.   Psychiatric/Behavioral: Negative.          Objective:      PHYSICAL EXAM: Apperance: Alert and orient in no distress,well developed, and with good attention to grooming and body habits  Patient presents ambulating in diabetic shoes and inserts with dressing clean and intact to left foot.   Lower Extremity Exam  VASCULAR: Dorsalis pedis and Posterior tibial pulses palpable on left foot.  Capillary fill time <4 sec to toes on left foot.  Moderate edema observed left. Varicosities present left. Skin temperature of the lower extremities is warm to warm, proximal to distal. Hair growth absent left. (--) lymphangitis or (-) cellulitis noted left.  DERMATOLOGICAL: (+) decreased edema, (-) erythema, (--) malodor, (+) decreased serosanginous drainage, (+) decreased warmth to left foot.  Ulcer noted to left medial and plantar hallux  with fibrotic/granular base. Ulcer measurement  0.3cm x 0.3cm x 0.1cm,0.1cm  x 0.1cm, 1cm x 0.5cmx0.5cmThe ulcer does not extend into deeper tissue and (+) sinus tracts exist.  The dorsum surface of the feet are soft and supple.  The plantar aspects of both feet are dry and scaly. Webspaces 1-3 clean, dry and without evidence of break in skin integrity left.   NEUROLOGICAL: Light touch, sharp-dull, proprioception all present and equal bilaterally.  Protective sensation absent sites as tested with a [place] 5.07 monofilament.   MUSCULOSKELETAL: Muscle strength is 5/5 for foot inverters, everters, plantarflexors, and dorsiflexors. Muscle tone is normal.  Inspection/palpation of bone, joints and muscles unremarkable with the exception of that noted above. 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                   Assessment:       Encounter Diagnoses   Name Primary?   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   With patient's permission, the left plantar foot ulcer was sharply excisionally debrided of viable and nonviable tissue down to good bleeding granular tissue base utilizing sterile #15 blade and tissue nipper and forceps. Wound was irrigated with sterile saline and bleeding was controlled with direct pressure. Minimal blood loss. The patient tolerated this well. Wound was then dressed with Collagenase, gauze, and kerlix. Patient was given instructions on daily dressing changes. Patient was also instructed on the importance of keeping the wound and dressings clean dry and intact and keeping pressure off the wound area until complete healing of the wound.The patient is alerted to watch for any signs of infection (redness, pus, pain, increased swelling  or fever) and call if such occurs. Home wound care instructions are provided.  Patient to continue antibiotics as prescribed.   Patient to return in 1 week or sooner if needed.             Nic Castillo DPM  [facility]

## 2017-06-09 ENCOUNTER — CLINICAL SUPPORT (OUTPATIENT)
Dept: PODIATRY | Facility: CLINIC | Age: 77
End: 2017-06-09
Payer: COMMERCIAL

## 2017-06-09 VITALS
BODY MASS INDEX: 38.41 KG/M2 | DIASTOLIC BLOOD PRESSURE: 81 MMHG | WEIGHT: 239 LBS | SYSTOLIC BLOOD PRESSURE: 146 MMHG | HEIGHT: 66 IN | HEART RATE: 102 BPM

## 2017-06-09 PROCEDURE — 99999 PR PBB SHADOW E&M-EST. PATIENT-LVL IV: CPT | Mod: PBBFAC,,,

## 2017-06-09 RX ORDER — FLUCONAZOLE 100 MG/1
100 TABLET ORAL DAILY
COMMUNITY
End: 2018-10-26

## 2017-06-09 NOTE — PROGRESS NOTES
Mr. Raymond Stuart, a diabetic patient arrived at the clinic today accompanied by his daughter to follow up on his left foot ulcer. He rated his current pain at a 2 when asked what was his pain level on a scale from 0-10. After Dr. Castillo completed her part of Mr. Stuart visit, she requested that he return to the clinic in two weeks. The scheduled appointment is set for Friday, June 23, 2017 at 3:20pm.

## 2017-06-12 ENCOUNTER — ANTI-COAG VISIT (OUTPATIENT)
Dept: CARDIOLOGY | Facility: CLINIC | Age: 77
End: 2017-06-12

## 2017-06-12 LAB — INR PPP: 2.1

## 2017-06-12 NOTE — PROGRESS NOTES
Verbal result taken from __Susy/Kami _______. PT/INR __2.1____ Date drawn___6/12_____ continue dose, repeat INR in 4 weeks. Orders faxed.

## 2017-06-23 ENCOUNTER — OFFICE VISIT (OUTPATIENT)
Dept: PODIATRY | Facility: CLINIC | Age: 77
End: 2017-06-23
Payer: COMMERCIAL

## 2017-06-23 VITALS
HEIGHT: 66 IN | DIASTOLIC BLOOD PRESSURE: 66 MMHG | SYSTOLIC BLOOD PRESSURE: 124 MMHG | BODY MASS INDEX: 39.08 KG/M2 | TEMPERATURE: 97 F | WEIGHT: 243.19 LBS | HEART RATE: 83 BPM

## 2017-06-23 DIAGNOSIS — L98.492 NEUROPATHIC ULCER, WITH FAT LAYER EXPOSED: Primary | ICD-10-CM

## 2017-06-23 DIAGNOSIS — E11.51 TYPE II DIABETES MELLITUS WITH PERIPHERAL CIRCULATORY DISORDER: ICD-10-CM

## 2017-06-23 PROCEDURE — 3045F PR MOST RECENT HEMOGLOBIN A1C LEVEL 7.0-9.0%: CPT | Mod: S$GLB,,, | Performed by: PODIATRIST

## 2017-06-23 PROCEDURE — 1126F AMNT PAIN NOTED NONE PRSNT: CPT | Mod: S$GLB,,, | Performed by: PODIATRIST

## 2017-06-23 PROCEDURE — 11042 DBRDMT SUBQ TIS 1ST 20SQCM/<: CPT | Mod: S$GLB,,, | Performed by: PODIATRIST

## 2017-06-23 PROCEDURE — 1159F MED LIST DOCD IN RCRD: CPT | Mod: S$GLB,,, | Performed by: PODIATRIST

## 2017-06-23 PROCEDURE — 99999 PR PBB SHADOW E&M-EST. PATIENT-LVL III: CPT | Mod: PBBFAC,,, | Performed by: PODIATRIST

## 2017-06-23 PROCEDURE — 4010F ACE/ARB THERAPY RXD/TAKEN: CPT | Mod: S$GLB,,, | Performed by: PODIATRIST

## 2017-06-23 PROCEDURE — 99212 OFFICE O/P EST SF 10 MIN: CPT | Mod: 25,S$GLB,, | Performed by: PODIATRIST

## 2017-06-23 NOTE — PROGRESS NOTES
Subjective:     Patient ID: Raymond Stuart is a 77 y.o. male.    Chief Complaint: Follow-up (Left foot ucler(bottom of foot-draining clear drainage/foot appears to be red on medial side). Patient states) and Diabetes Mellitus (Last PCP visit with - 5/22/17. 132 mg/dL-today's glucose. )    Raymond is a 77 y.o. male who presents to the clinic for evaluation and treatment of high risk feet. Raymond has a past medical history of Diabetes mellitus, type 2; Hyperlipidemia; Hypertension; Irregular heartbeat; PVD (peripheral vascular disease); S/P femoral-popliteal bypass surgery (8/8/2014); and S/P peripheral artery angioplasty (8/8/2014). The patient's chief complaint is diabetic foot ulcer, left foot. Patient states he is taking oral antibiotics. Patient states his blood sugar this morning was 132mg/dl. This patient has documented high risk feet requiring routine maintenance secondary to diabetes mellitis and those secondary complications of diabetes, as mentioned. Patient's daughter states patient to scheduled to follow up with infectious disease doctor on July 6 and Dr. Judge on July 10.     PCP: JOHNATHON Cristobal Jr, MD    Date Last Seen by PCP: 5/22/17    Current shoe gear:  Affected Foot: Rx diabetic extra depth shoes and custom accommodative insoles     Unaffected Foot: Rx diabetic extra depth shoes and custom accommodative insoles    History of Trauma: negative  Sign of Infection: none    Hemoglobin A1C   Date Value Ref Range Status   05/16/2017 7.3 (H) 4.5 - 6.2 % Final     Comment:     According to ADA guidelines, hemoglobin A1C <7.0% represents  optimal control in non-pregnant diabetic patients.  Different  metrics may apply to specific populations.   Standards of Medical Care in Diabetes - 2016.  For the purpose of screening for the presence of diabetes:  <5.7%     Consistent with the absence of diabetes  5.7-6.4%  Consistent with increasing risk for diabetes   (prediabetes)  >or=6.5%  Consistent with  diabetes  Currently no consensus exists for use of hemoglobin A1C  for diagnosis of diabetes for children.     11/09/2016 6.9 (H) 4.5 - 6.2 % Final     Comment:     According to ADA guidelines, hemoglobin A1C <7.0% represents  optimal control in non-pregnant diabetic patients.  Different  metrics may apply to specific populations.   Standards of Medical Care in Diabetes - 2016.  For the purpose of screening for the presence of diabetes:  <5.7%     Consistent with the absence of diabetes  5.7-6.4%  Consistent with increasing risk for diabetes   (prediabetes)  >or=6.5%  Consistent with diabetes  Currently no consensus exists for use of hemoglobin A1C  for diagnosis of diabetes for children.     08/10/2016 8.1 (H) 4.5 - 6.2 % Final     Comment:     According to ADA guidelines, hemoglobin A1C <7.0% represents  optimal control in non-pregnant diabetic patients.  Different  metrics may apply to specific populations.   Standards of Medical Care in Diabetes - 2016.  For the purpose of screening for the presence of diabetes:  <5.7%     Consistent with the absence of diabetes  5.7-6.4%  Consistent with increasing risk for diabetes   (prediabetes)  >or=6.5%  Consistent with diabetes  Currently no consensus exists for use of hemoglobin A1C  for diagnosis of diabetes for children.             Patient Active Problem List   Diagnosis    Hyperlipidemia associated with type 2 diabetes mellitus    Essential hypertension    A-fib    PVD (peripheral vascular disease)    Special screening for malignant neoplasms, colon    S/P femoral-popliteal bypass surgery    S/P peripheral artery angioplasty    Type 2 diabetes mellitus with microalbuminuria    Type 2 diabetes mellitus with diabetic neuropathy    Osteomyelitis of toe of left foot       Medication List with Changes/Refills   Current Medications    ATENOLOL (TENORMIN) 100 MG TABLET    Take 1 tablet (100 mg total) by mouth once daily.    CEPHALEXIN (KEFLEX) 500 MG CAPSULE         CLOPIDOGREL (PLAVIX) 75 MG TABLET    Take 1 tablet by mouth  daily    DAPTOMYCIN (CUBICIN) INJECTION        DIPHENHYDRAMINE (BENADRYL) 50 MG/ML INJECTION        DOXYCYCLINE (VIBRAMYCIN) 100 MG CAP        FENOFIBRATE MICRONIZED (LOFIBRA) 134 MG CAP    Take 1 capsule (134 mg total) by mouth once daily.    FLUCONAZOLE (DIFLUCAN) 100 MG TABLET    Take 100 mg by mouth once daily.    GABAPENTIN (NEURONTIN) 600 MG TABLET    Take 2 tablets (1,200 mg total) by mouth 2 (two) times daily.    GLIPIZIDE-METFORMIN (METAGLIP) 2.5-500 MG PER TABLET    Take 2 tablets by mouth  twice a day before meals in the morning and evening    HEPARIN, PORCINE, PF, (HEPARIN FLUSH 100 UNITS/ML) 100 UNIT/ML SYRG        HYDROCODONE-ACETAMINOPHEN 7.5-325MG (NORCO) 7.5-325 MG PER TABLET    Take 1 tablet by mouth every 6 (six) hours as needed for Pain.    INVANZ 1 GRAM INJECTION        LISINOPRIL-HYDROCHLOROTHIAZIDE (PRINZIDE,ZESTORETIC) 20-12.5 MG PER TABLET    Take 2 tablets by mouth once daily.    MINOCYCLINE (MINOCIN,DYNACIN) 100 MG CAPSULE    Take 100 mg by mouth.     NIACIN 1,000 MG TBSR    Take 1 tablet by mouth Daily.    NORMAL SALINE FLUSH 0.9 % INJECTION        PREDNISONE (DELTASONE) 20 MG TABLET        ROSUVASTATIN (CRESTOR) 40 MG TAB    Take 1 tablet (40 mg total) by mouth once daily.    SANTYL OINTMENT        VANCOMYCIN HCL IN DEXTROSE 5 % (VANCOMYCIN IN DEXTROSE 5 %) 1 GRAM/200 ML PGBK        WARFARIN (COUMADIN) 5 MG TABLET    TAKE 1 AND 1/2 TABLETS BY  MOUTH ON WEDNESDAY AND  SUNDAY AND 1 TABLET ALL  REMAINING DAY AS DIRECTED  BY THE COUMADIN CLINIC.       Review of patient's allergies indicates:   Allergen Reactions    Sulfa (sulfonamide antibiotics)      Other reaction(s): Stomach upset    Sulfamethoxazole Rash    Trimethoprim Rash     Bactrim         Past Surgical History:   Procedure Laterality Date    CARDIAC ELECTROPHYSIOLOGY MAPPING AND ABLATION      POPLITEAL ARTERY STENT  2013    RECTOPERITONEAL FISTULA CLOSURE       "TOE SURGERY      Joint release    TONSILLECTOMY         History reviewed. No pertinent family history.    Social History     Social History    Marital status:      Spouse name: N/A    Number of children: N/A    Years of education: N/A     Occupational History    Retired [employer]     Social History Main Topics    Smoking status: Former Smoker     Packs/day: 4.00     Years: 30.00     Types: Cigarettes     Quit date: 1/9/1994    Smokeless tobacco: Never Used    Alcohol use No    Drug use: No    Sexual activity: Not on file     Other Topics Concern    Not on file     Social History Narrative    No narrative on file       Vitals:    06/23/17 1510   BP: 124/66   Pulse: 83   Temp: 97.3 °F (36.3 °C)   TempSrc: Oral   Weight: 110.3 kg (243 lb 2.7 oz)   Height: 5' 6" (1.676 m)   PainSc: 0-No pain       Hemoglobin A1C   Date Value Ref Range Status   05/16/2017 7.3 (H) 4.5 - 6.2 % Final     Comment:     According to ADA guidelines, hemoglobin A1C <7.0% represents  optimal control in non-pregnant diabetic patients.  Different  metrics may apply to specific populations.   Standards of Medical Care in Diabetes - 2016.  For the purpose of screening for the presence of diabetes:  <5.7%     Consistent with the absence of diabetes  5.7-6.4%  Consistent with increasing risk for diabetes   (prediabetes)  >or=6.5%  Consistent with diabetes  Currently no consensus exists for use of hemoglobin A1C  for diagnosis of diabetes for children.     11/09/2016 6.9 (H) 4.5 - 6.2 % Final     Comment:     According to ADA guidelines, hemoglobin A1C <7.0% represents  optimal control in non-pregnant diabetic patients.  Different  metrics may apply to specific populations.   Standards of Medical Care in Diabetes - 2016.  For the purpose of screening for the presence of diabetes:  <5.7%     Consistent with the absence of diabetes  5.7-6.4%  Consistent with increasing risk for diabetes   (prediabetes)  >or=6.5%  Consistent " with diabetes  Currently no consensus exists for use of hemoglobin A1C  for diagnosis of diabetes for children.     08/10/2016 8.1 (H) 4.5 - 6.2 % Final     Comment:     According to ADA guidelines, hemoglobin A1C <7.0% represents  optimal control in non-pregnant diabetic patients.  Different  metrics may apply to specific populations.   Standards of Medical Care in Diabetes - 2016.  For the purpose of screening for the presence of diabetes:  <5.7%     Consistent with the absence of diabetes  5.7-6.4%  Consistent with increasing risk for diabetes   (prediabetes)  >or=6.5%  Consistent with diabetes  Currently no consensus exists for use of hemoglobin A1C  for diagnosis of diabetes for children.         Review of Systems   Constitutional: Negative for chills and fever.   Respiratory: Negative for shortness of breath.    Cardiovascular: Negative for chest pain, palpitations, orthopnea, claudication and leg swelling.   Gastrointestinal: Negative for diarrhea, nausea and vomiting.   Musculoskeletal: Negative for joint pain.   Skin: Negative for rash.   Neurological: Positive for tingling and sensory change. Negative for dizziness, focal weakness and weakness.   Endo/Heme/Allergies: Bruises/bleeds easily.   Psychiatric/Behavioral: Negative.          Objective:      PHYSICAL EXAM: Apperance: Alert and orient in no distress,well developed, and with good attention to grooming and body habits  Patient presents ambulating in diabetic shoes and inserts with dressing clean and intact to left foot.   Lower Extremity Exam  VASCULAR: Dorsalis pedis and Posterior tibial pulses palpable on left foot.  Capillary fill time <4 sec to toes on left foot.  Moderate edema observed left. Varicosities present left. Skin temperature of the lower extremities is warm to warm, proximal to distal. Hair growth absent left. (--) lymphangitis or (-) cellulitis noted left.  DERMATOLOGICAL: (-) edema, (-) erythema, (--) malodor, (+) decreased  serosanginous drainage, (-) warmth to left foot. Previous ulcers noted to left medial hallux closed. Left ulcer plantar hallux  with granular base. Ulcer measurement  0.2cm x 0.1cmx0.2cmThe ulcer does not extend into deeper tissue and (-) sinus tracts exist.  The dorsum surface of the feet are soft and supple.  The plantar aspects of both feet are dry and scaly. Webspaces 1-3 clean, dry and without evidence of break in skin integrity left.   NEUROLOGICAL: Light touch, sharp-dull, proprioception all present and equal bilaterally.  Protective sensation absent sites as tested with a [place]-Jabari 5.07 monofilament.   MUSCULOSKELETAL: Muscle strength is 5/5 for foot inverters, everters, plantarflexors, and dorsiflexors. Muscle tone is normal.  Inspection/palpation of bone, joints and muscles unremarkable with the exception of that noted above. 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         Assessment:       Encounter Diagnoses   Name Primary?   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   With patient's permission, the left foot wound was sharply excisionally debrided of viable and nonviable tissue from the wound periphery and base. I redefined the wound borders in the process. Debridement was carried into and did include the subcutaneous layer down to good bleeding granular tissue base utilizing sterile #15 blade and tissue nipper and forceps. Wound was irrigated with sterile saline and bleeding was controlled with direct pressure. Minimal blood loss.  Post debridement measurements are contained in the above progress note. The patient tolerated this well. Wound was then dressed with  Collagenase and Mepilex pad. Patient was given instructions on daily dressing changes. Patient was also instructed on the importance of keeping the wound and dressings clean dry and intact and keeping pressure off the wound area until complete healing of the wound.The patient is alerted to watch for any signs of infection (redness, pus, pain, increased swelling or fever) and call if such occurs. Home wound care instructions are provided.  Patient to continue antibiotics as prescribed.   Patient to return in 1 month or sooner if needed.             Nic Castillo DPM  [facility]

## 2017-07-10 ENCOUNTER — ANTI-COAG VISIT (OUTPATIENT)
Dept: CARDIOLOGY | Facility: CLINIC | Age: 77
End: 2017-07-10

## 2017-07-10 LAB — INR PPP: 1.5

## 2017-07-10 NOTE — PROGRESS NOTES
Verbal result taken from __Susy/Kami _______. PT/INR __17.5/1.5____ Date drawn__7/10____ Take 10mg tonight then resume scheduled dose, repeat INR in1 week.

## 2017-07-13 ENCOUNTER — PATIENT MESSAGE (OUTPATIENT)
Dept: PODIATRY | Facility: CLINIC | Age: 77
End: 2017-07-13

## 2017-07-17 ENCOUNTER — ANTI-COAG VISIT (OUTPATIENT)
Dept: CARDIOLOGY | Facility: CLINIC | Age: 77
End: 2017-07-17

## 2017-07-17 LAB — INR PPP: 2.1

## 2017-07-17 NOTE — PROGRESS NOTES
Verbal result taken from __Susy/Kami _______. PT/INR __25.0/2.1____ Date drawn___7/17____ patient currently completing augmentin. No other changes noted. Continue dose, repeat INR in 1 week.

## 2017-07-21 ENCOUNTER — PATIENT MESSAGE (OUTPATIENT)
Dept: PODIATRY | Facility: CLINIC | Age: 77
End: 2017-07-21

## 2017-07-21 ENCOUNTER — OFFICE VISIT (OUTPATIENT)
Dept: PODIATRY | Facility: CLINIC | Age: 77
End: 2017-07-21
Payer: COMMERCIAL

## 2017-07-21 VITALS
WEIGHT: 245.81 LBS | HEART RATE: 102 BPM | SYSTOLIC BLOOD PRESSURE: 128 MMHG | HEIGHT: 66 IN | DIASTOLIC BLOOD PRESSURE: 65 MMHG | BODY MASS INDEX: 39.51 KG/M2

## 2017-07-21 DIAGNOSIS — E11.51 TYPE II DIABETES MELLITUS WITH PERIPHERAL CIRCULATORY DISORDER: Primary | ICD-10-CM

## 2017-07-21 DIAGNOSIS — L84 PRE-ULCERATIVE CALLUSES: ICD-10-CM

## 2017-07-21 PROCEDURE — 3045F PR MOST RECENT HEMOGLOBIN A1C LEVEL 7.0-9.0%: CPT | Mod: S$GLB,,, | Performed by: PODIATRIST

## 2017-07-21 PROCEDURE — 99999 PR PBB SHADOW E&M-EST. PATIENT-LVL III: CPT | Mod: PBBFAC,,, | Performed by: PODIATRIST

## 2017-07-21 PROCEDURE — 1159F MED LIST DOCD IN RCRD: CPT | Mod: S$GLB,,, | Performed by: PODIATRIST

## 2017-07-21 PROCEDURE — 4010F ACE/ARB THERAPY RXD/TAKEN: CPT | Mod: S$GLB,,, | Performed by: PODIATRIST

## 2017-07-21 PROCEDURE — 1125F AMNT PAIN NOTED PAIN PRSNT: CPT | Mod: S$GLB,,, | Performed by: PODIATRIST

## 2017-07-21 PROCEDURE — 99212 OFFICE O/P EST SF 10 MIN: CPT | Mod: S$GLB,,, | Performed by: PODIATRIST

## 2017-07-21 RX ORDER — AMOXICILLIN AND CLAVULANATE POTASSIUM 500; 125 MG/1; MG/1
1 TABLET, FILM COATED ORAL 2 TIMES DAILY
COMMUNITY
End: 2017-11-30 | Stop reason: ALTCHOICE

## 2017-07-21 NOTE — PROGRESS NOTES
Subjective:     Patient ID: Raymond Stuart is a 77 y.o. male.    Chief Complaint: Foot Ulcer (Left foot ulcer(appears to be closed and no drainage). Patient rates the current pain 3/10.) and Diabetes Mellitus (AM glucose-124 mg/dL. Last PCP visit with - 5/22/17.)    Raymond is a 77 y.o. male who presents to the clinic for evaluation and treatment of high risk feet. Raymond has a past medical history of Diabetes mellitus, type 2; Hyperlipidemia; Hypertension; Irregular heartbeat; PVD (peripheral vascular disease); S/P femoral-popliteal bypass surgery (8/8/2014); and S/P peripheral artery angioplasty (8/8/2014). The patient's chief complaint is diabetic foot ulcer, left foot, is now closed. Patient states he is taking oral antibiotics. Patient states his blood sugar this morning was 124mg/dl. This patient has documented high risk feet requiring routine maintenance secondary to diabetes mellitis and those secondary complications of diabetes, as mentioned.     PCP: JOHNATHON Cristobal Jr, MD    Date Last Seen by PCP: 5/22/17    Current shoe gear:  Affected Foot: Rx diabetic extra depth shoes and custom accommodative insoles     Unaffected Foot: Rx diabetic extra depth shoes and custom accommodative insoles    History of Trauma: negative  Sign of Infection: none    Hemoglobin A1C   Date Value Ref Range Status   05/16/2017 7.3 (H) 4.5 - 6.2 % Final     Comment:     According to ADA guidelines, hemoglobin A1C <7.0% represents  optimal control in non-pregnant diabetic patients.  Different  metrics may apply to specific populations.   Standards of Medical Care in Diabetes - 2016.  For the purpose of screening for the presence of diabetes:  <5.7%     Consistent with the absence of diabetes  5.7-6.4%  Consistent with increasing risk for diabetes   (prediabetes)  >or=6.5%  Consistent with diabetes  Currently no consensus exists for use of hemoglobin A1C  for diagnosis of diabetes for children.     11/09/2016 6.9 (H) 4.5 - 6.2  % Final     Comment:     According to ADA guidelines, hemoglobin A1C <7.0% represents  optimal control in non-pregnant diabetic patients.  Different  metrics may apply to specific populations.   Standards of Medical Care in Diabetes - 2016.  For the purpose of screening for the presence of diabetes:  <5.7%     Consistent with the absence of diabetes  5.7-6.4%  Consistent with increasing risk for diabetes   (prediabetes)  >or=6.5%  Consistent with diabetes  Currently no consensus exists for use of hemoglobin A1C  for diagnosis of diabetes for children.     08/10/2016 8.1 (H) 4.5 - 6.2 % Final     Comment:     According to ADA guidelines, hemoglobin A1C <7.0% represents  optimal control in non-pregnant diabetic patients.  Different  metrics may apply to specific populations.   Standards of Medical Care in Diabetes - 2016.  For the purpose of screening for the presence of diabetes:  <5.7%     Consistent with the absence of diabetes  5.7-6.4%  Consistent with increasing risk for diabetes   (prediabetes)  >or=6.5%  Consistent with diabetes  Currently no consensus exists for use of hemoglobin A1C  for diagnosis of diabetes for children.             Patient Active Problem List   Diagnosis    Hyperlipidemia associated with type 2 diabetes mellitus    Essential hypertension    A-fib    PVD (peripheral vascular disease)    Special screening for malignant neoplasms, colon    S/P femoral-popliteal bypass surgery    S/P peripheral artery angioplasty    Type 2 diabetes mellitus with microalbuminuria    Type 2 diabetes mellitus with diabetic neuropathy    Osteomyelitis of toe of left foot       Medication List with Changes/Refills   Current Medications    AMOXICILLIN-CLAVULANATE 500-125MG (AUGMENTIN) 500-125 MG TAB    Take 1 tablet by mouth 2 (two) times daily.    ATENOLOL (TENORMIN) 100 MG TABLET    Take 1 tablet (100 mg total) by mouth once daily.    CEPHALEXIN (KEFLEX) 500 MG CAPSULE        CLOPIDOGREL (PLAVIX) 75 MG  TABLET    Take 1 tablet by mouth  daily    DAPTOMYCIN (CUBICIN) INJECTION        DIPHENHYDRAMINE (BENADRYL) 50 MG/ML INJECTION        DOXYCYCLINE (VIBRAMYCIN) 100 MG CAP        FENOFIBRATE MICRONIZED (LOFIBRA) 134 MG CAP    Take 1 capsule (134 mg total) by mouth once daily.    FLUCONAZOLE (DIFLUCAN) 100 MG TABLET    Take 100 mg by mouth once daily.    GABAPENTIN (NEURONTIN) 600 MG TABLET    Take 2 tablets (1,200 mg total) by mouth 2 (two) times daily.    GLIPIZIDE-METFORMIN (METAGLIP) 2.5-500 MG PER TABLET    Take 2 tablets by mouth  twice a day before meals in the morning and evening    HEPARIN, PORCINE, PF, (HEPARIN FLUSH 100 UNITS/ML) 100 UNIT/ML SYRG        HYDROCODONE-ACETAMINOPHEN 7.5-325MG (NORCO) 7.5-325 MG PER TABLET    Take 1 tablet by mouth every 6 (six) hours as needed for Pain.    INVANZ 1 GRAM INJECTION        LISINOPRIL-HYDROCHLOROTHIAZIDE (PRINZIDE,ZESTORETIC) 20-12.5 MG PER TABLET    Take 2 tablets by mouth once daily.    MINOCYCLINE (MINOCIN,DYNACIN) 100 MG CAPSULE    Take 100 mg by mouth.     NIACIN 1,000 MG TBSR    Take 1 tablet by mouth Daily.    NORMAL SALINE FLUSH 0.9 % INJECTION        PREDNISONE (DELTASONE) 20 MG TABLET        ROSUVASTATIN (CRESTOR) 40 MG TAB    Take 1 tablet (40 mg total) by mouth once daily.    SANTYL OINTMENT        VANCOMYCIN HCL IN DEXTROSE 5 % (VANCOMYCIN IN DEXTROSE 5 %) 1 GRAM/200 ML PGBK        WARFARIN (COUMADIN) 5 MG TABLET    TAKE 1 AND 1/2 TABLETS BY  MOUTH ON WEDNESDAY AND  SUNDAY AND 1 TABLET ALL  REMAINING DAY AS DIRECTED  BY THE COUMADIN CLINIC.       Review of patient's allergies indicates:   Allergen Reactions    Sulfa (sulfonamide antibiotics)      Other reaction(s): Stomach upset    Sulfamethoxazole Rash    Trimethoprim Rash     Bactrim         Past Surgical History:   Procedure Laterality Date    CARDIAC ELECTROPHYSIOLOGY MAPPING AND ABLATION      POPLITEAL ARTERY STENT  2013    RECTOPERITONEAL FISTULA CLOSURE      TOE SURGERY      Joint release  "   TONSILLECTOMY         History reviewed. No pertinent family history.    Social History     Social History    Marital status:      Spouse name: N/A    Number of children: N/A    Years of education: N/A     Occupational History    Retired [employer]     Social History Main Topics    Smoking status: Former Smoker     Packs/day: 4.00     Years: 30.00     Types: Cigarettes     Quit date: 1/9/1994    Smokeless tobacco: Never Used    Alcohol use No    Drug use: No    Sexual activity: Not on file     Other Topics Concern    Not on file     Social History Narrative    No narrative on file       Vitals:    07/21/17 1059   BP: 128/65   Pulse: 102   Weight: 111.5 kg (245 lb 13 oz)   Height: 5' 6" (1.676 m)   PainSc:   3   PainLoc: Foot       Hemoglobin A1C   Date Value Ref Range Status   05/16/2017 7.3 (H) 4.5 - 6.2 % Final     Comment:     According to ADA guidelines, hemoglobin A1C <7.0% represents  optimal control in non-pregnant diabetic patients.  Different  metrics may apply to specific populations.   Standards of Medical Care in Diabetes - 2016.  For the purpose of screening for the presence of diabetes:  <5.7%     Consistent with the absence of diabetes  5.7-6.4%  Consistent with increasing risk for diabetes   (prediabetes)  >or=6.5%  Consistent with diabetes  Currently no consensus exists for use of hemoglobin A1C  for diagnosis of diabetes for children.     11/09/2016 6.9 (H) 4.5 - 6.2 % Final     Comment:     According to ADA guidelines, hemoglobin A1C <7.0% represents  optimal control in non-pregnant diabetic patients.  Different  metrics may apply to specific populations.   Standards of Medical Care in Diabetes - 2016.  For the purpose of screening for the presence of diabetes:  <5.7%     Consistent with the absence of diabetes  5.7-6.4%  Consistent with increasing risk for diabetes   (prediabetes)  >or=6.5%  Consistent with diabetes  Currently no consensus exists for use of " hemoglobin A1C  for diagnosis of diabetes for children.     08/10/2016 8.1 (H) 4.5 - 6.2 % Final     Comment:     According to ADA guidelines, hemoglobin A1C <7.0% represents  optimal control in non-pregnant diabetic patients.  Different  metrics may apply to specific populations.   Standards of Medical Care in Diabetes - 2016.  For the purpose of screening for the presence of diabetes:  <5.7%     Consistent with the absence of diabetes  5.7-6.4%  Consistent with increasing risk for diabetes   (prediabetes)  >or=6.5%  Consistent with diabetes  Currently no consensus exists for use of hemoglobin A1C  for diagnosis of diabetes for children.         Review of Systems   Constitutional: Negative for chills and fever.   Respiratory: Negative for shortness of breath.    Cardiovascular: Negative for chest pain, palpitations, orthopnea, claudication and leg swelling.   Gastrointestinal: Negative for diarrhea, nausea and vomiting.   Musculoskeletal: Negative for joint pain.   Skin: Negative for rash.   Neurological: Positive for tingling and sensory change. Negative for dizziness, focal weakness and weakness.   Endo/Heme/Allergies: Bruises/bleeds easily.   Psychiatric/Behavioral: Negative.          Objective:      PHYSICAL EXAM: Apperance: Alert and orient in no distress,well developed, and with good attention to grooming and body habits  Patient presents ambulating in diabetic shoes and inserts with dressing clean and intact to left foot.   Lower Extremity Exam  VASCULAR: Dorsalis pedis and Posterior tibial pulses palpable on left foot.  Capillary fill time <4 sec to toes on left foot.  Moderate edema observed left. Varicosities present left. Skin temperature of the lower extremities is warm to warm, proximal to distal. Hair growth absent left. (--) lymphangitis or (-) cellulitis noted left.  DERMATOLOGICAL: (-) edema, (-) erythema, (--) malodor, (+) decreased serosanginous drainage, (-) warmth to left foot. Previous ulcers  noted to left medial hallux and plantar 1st submetatarsal. The dorsum surface of the feet are soft and supple.  The plantar aspects of both feet are dry and scaly. Webspaces 1-3 clean, dry and without evidence of break in skin integrity left.   NEUROLOGICAL: Light touch, sharp-dull, proprioception all present and equal bilaterally.  Protective sensation absent sites as tested with a [place]-Jabari 5.07 monofilament.   MUSCULOSKELETAL: Muscle strength is 5/5 for foot inverters, everters, plantarflexors, and dorsiflexors. Muscle tone is normal.  Inspection/palpation of bone, joints and muscles unremarkable with the exception of that noted above. 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         Assessment:       Encounter Diagnoses   Name Primary?    Type II diabetes mellitus with peripheral circulatory disorder Yes    Pre-ulcerative calluses - Left Foot          Plan:   Type II diabetes mellitus with peripheral circulatory disorder    Pre-ulcerative calluses - Left Foot      I counseled the patient on his conditions, regarding findings of my examination, my impressions, and usual treatment plan.   The patient is alerted to watch for any signs of infection (redness, pus, pain, increased swelling or fever) and call if such occurs. Home wound care instructions are provided.  Patient to continue antibiotics as prescribed.   Patient to return in 1 month or sooner if needed.             Nic Castillo DPM  [facility]

## 2017-07-24 ENCOUNTER — ANTI-COAG VISIT (OUTPATIENT)
Dept: CARDIOLOGY | Facility: CLINIC | Age: 77
End: 2017-07-24

## 2017-07-24 LAB — INR PPP: 2.1

## 2017-07-24 NOTE — PROGRESS NOTES
Verbal result taken from __Susy/Kami _______. PT/INR _ 25.3/2.1_____ Date drawn___7/24_____ continue dose, repeat INR in 2 weeks.

## 2017-08-04 ENCOUNTER — PATIENT MESSAGE (OUTPATIENT)
Dept: CARDIOLOGY | Facility: CLINIC | Age: 77
End: 2017-08-04

## 2017-08-08 ENCOUNTER — ANTI-COAG VISIT (OUTPATIENT)
Dept: CARDIOLOGY | Facility: CLINIC | Age: 77
End: 2017-08-08

## 2017-08-08 LAB — INR PPP: 2.6

## 2017-08-08 NOTE — PROGRESS NOTES
Verbal result taken from _Ana Maria/Kami _______. PT/INR _30.8/2.6____ Date drawn_8/8____ continue dose, repeat INR in 3 weeks.

## 2017-08-25 ENCOUNTER — OFFICE VISIT (OUTPATIENT)
Dept: PODIATRY | Facility: CLINIC | Age: 77
End: 2017-08-25
Payer: COMMERCIAL

## 2017-08-25 VITALS
SYSTOLIC BLOOD PRESSURE: 120 MMHG | HEART RATE: 80 BPM | WEIGHT: 248 LBS | HEIGHT: 66 IN | DIASTOLIC BLOOD PRESSURE: 73 MMHG | BODY MASS INDEX: 39.86 KG/M2

## 2017-08-25 DIAGNOSIS — L84 PRE-ULCERATIVE CALLUSES: ICD-10-CM

## 2017-08-25 DIAGNOSIS — E11.51 TYPE II DIABETES MELLITUS WITH PERIPHERAL CIRCULATORY DISORDER: Primary | ICD-10-CM

## 2017-08-25 PROCEDURE — 4010F ACE/ARB THERAPY RXD/TAKEN: CPT | Mod: S$GLB,,, | Performed by: PODIATRIST

## 2017-08-25 PROCEDURE — 3045F PR MOST RECENT HEMOGLOBIN A1C LEVEL 7.0-9.0%: CPT | Mod: S$GLB,,, | Performed by: PODIATRIST

## 2017-08-25 PROCEDURE — 1159F MED LIST DOCD IN RCRD: CPT | Mod: S$GLB,,, | Performed by: PODIATRIST

## 2017-08-25 PROCEDURE — 1126F AMNT PAIN NOTED NONE PRSNT: CPT | Mod: S$GLB,,, | Performed by: PODIATRIST

## 2017-08-25 PROCEDURE — 3074F SYST BP LT 130 MM HG: CPT | Mod: S$GLB,,, | Performed by: PODIATRIST

## 2017-08-25 PROCEDURE — 3008F BODY MASS INDEX DOCD: CPT | Mod: S$GLB,,, | Performed by: PODIATRIST

## 2017-08-25 PROCEDURE — 99212 OFFICE O/P EST SF 10 MIN: CPT | Mod: S$GLB,,, | Performed by: PODIATRIST

## 2017-08-25 PROCEDURE — 99999 PR PBB SHADOW E&M-EST. PATIENT-LVL III: CPT | Mod: PBBFAC,,, | Performed by: PODIATRIST

## 2017-08-25 PROCEDURE — 3078F DIAST BP <80 MM HG: CPT | Mod: S$GLB,,, | Performed by: PODIATRIST

## 2017-08-25 NOTE — PROGRESS NOTES
Subjective:     Patient ID: Raymond Stuart is a 77 y.o. male.    Chief Complaint: Foot Problem (diabetic patient, left foot, patient is accompanied by his daughter, patient denied current pain with his feet )    Raymond is a 77 y.o. male who presents to the clinic for evaluation and treatment of high risk feet. Raymond has a past medical history of Diabetes mellitus, type 2; Hyperlipidemia; Hypertension; Irregular heartbeat; PVD (peripheral vascular disease); S/P femoral-popliteal bypass surgery (8/8/2014); and S/P peripheral artery angioplasty (8/8/2014). The patient's chief complaint is diabetic foot ulcer, left foot, is now closed. Patient states he is taking oral antibiotics. Patient states his blood sugar this morning was 107mg/dl. This patient has documented high risk feet requiring routine maintenance secondary to diabetes mellitis and those secondary complications of diabetes, as mentioned.     PCP: JOHNATHON Cristobal Jr, MD    Date Last Seen by PCP: 5/22/17    Current shoe gear:  Affected Foot: Rx diabetic extra depth shoes and custom accommodative insoles     Unaffected Foot: Rx diabetic extra depth shoes and custom accommodative insoles    History of Trauma: negative  Sign of Infection: none    Hemoglobin A1C   Date Value Ref Range Status   05/16/2017 7.3 (H) 4.5 - 6.2 % Final     Comment:     According to ADA guidelines, hemoglobin A1C <7.0% represents  optimal control in non-pregnant diabetic patients.  Different  metrics may apply to specific populations.   Standards of Medical Care in Diabetes - 2016.  For the purpose of screening for the presence of diabetes:  <5.7%     Consistent with the absence of diabetes  5.7-6.4%  Consistent with increasing risk for diabetes   (prediabetes)  >or=6.5%  Consistent with diabetes  Currently no consensus exists for use of hemoglobin A1C  for diagnosis of diabetes for children.     11/09/2016 6.9 (H) 4.5 - 6.2 % Final     Comment:     According to ADA guidelines, hemoglobin  A1C <7.0% represents  optimal control in non-pregnant diabetic patients.  Different  metrics may apply to specific populations.   Standards of Medical Care in Diabetes - 2016.  For the purpose of screening for the presence of diabetes:  <5.7%     Consistent with the absence of diabetes  5.7-6.4%  Consistent with increasing risk for diabetes   (prediabetes)  >or=6.5%  Consistent with diabetes  Currently no consensus exists for use of hemoglobin A1C  for diagnosis of diabetes for children.     08/10/2016 8.1 (H) 4.5 - 6.2 % Final     Comment:     According to ADA guidelines, hemoglobin A1C <7.0% represents  optimal control in non-pregnant diabetic patients.  Different  metrics may apply to specific populations.   Standards of Medical Care in Diabetes - 2016.  For the purpose of screening for the presence of diabetes:  <5.7%     Consistent with the absence of diabetes  5.7-6.4%  Consistent with increasing risk for diabetes   (prediabetes)  >or=6.5%  Consistent with diabetes  Currently no consensus exists for use of hemoglobin A1C  for diagnosis of diabetes for children.             Patient Active Problem List   Diagnosis    Hyperlipidemia associated with type 2 diabetes mellitus    Essential hypertension    A-fib    PVD (peripheral vascular disease)    Special screening for malignant neoplasms, colon    S/P femoral-popliteal bypass surgery    S/P peripheral artery angioplasty    Type 2 diabetes mellitus with microalbuminuria    Type 2 diabetes mellitus with diabetic neuropathy    Osteomyelitis of toe of left foot       Medication List with Changes/Refills   Current Medications    AMOXICILLIN-CLAVULANATE 500-125MG (AUGMENTIN) 500-125 MG TAB    Take 1 tablet by mouth 2 (two) times daily.    ATENOLOL (TENORMIN) 100 MG TABLET    Take 1 tablet (100 mg total) by mouth once daily.    CEPHALEXIN (KEFLEX) 500 MG CAPSULE        CLOPIDOGREL (PLAVIX) 75 MG TABLET    Take 1 tablet by mouth  daily    DAPTOMYCIN (CUBICIN)  INJECTION        DIPHENHYDRAMINE (BENADRYL) 50 MG/ML INJECTION        DOXYCYCLINE (VIBRAMYCIN) 100 MG CAP        FENOFIBRATE MICRONIZED (LOFIBRA) 134 MG CAP    Take 1 capsule (134 mg total) by mouth once daily.    FLUCONAZOLE (DIFLUCAN) 100 MG TABLET    Take 100 mg by mouth once daily.    GABAPENTIN (NEURONTIN) 600 MG TABLET    Take 2 tablets (1,200 mg total) by mouth 2 (two) times daily.    GLIPIZIDE-METFORMIN (METAGLIP) 2.5-500 MG PER TABLET    Take 2 tablets by mouth  twice a day before meals in the morning and evening    HEPARIN, PORCINE, PF, (HEPARIN FLUSH 100 UNITS/ML) 100 UNIT/ML SYRG        HYDROCODONE-ACETAMINOPHEN 7.5-325MG (NORCO) 7.5-325 MG PER TABLET    Take 1 tablet by mouth every 6 (six) hours as needed for Pain.    INVANZ 1 GRAM INJECTION        LISINOPRIL-HYDROCHLOROTHIAZIDE (PRINZIDE,ZESTORETIC) 20-12.5 MG PER TABLET    Take 2 tablets by mouth once daily.    MINOCYCLINE (MINOCIN,DYNACIN) 100 MG CAPSULE    Take 100 mg by mouth.     NIACIN 1,000 MG TBSR    Take 1 tablet by mouth Daily.    NORMAL SALINE FLUSH 0.9 % INJECTION        PREDNISONE (DELTASONE) 20 MG TABLET        ROSUVASTATIN (CRESTOR) 40 MG TAB    Take 1 tablet (40 mg total) by mouth once daily.    SANTYL OINTMENT        VANCOMYCIN HCL IN DEXTROSE 5 % (VANCOMYCIN IN DEXTROSE 5 %) 1 GRAM/200 ML PGBK        WARFARIN (COUMADIN) 5 MG TABLET    TAKE 1 AND 1/2 TABLETS BY  MOUTH ON WEDNESDAY AND  SUNDAY AND 1 TABLET ALL  REMAINING DAY AS DIRECTED  BY THE COUMADIN CLINIC.       Review of patient's allergies indicates:   Allergen Reactions    Sulfa (sulfonamide antibiotics)      Other reaction(s): Stomach upset    Sulfamethoxazole Rash    Trimethoprim Rash     Bactrim         Past Surgical History:   Procedure Laterality Date    CARDIAC ELECTROPHYSIOLOGY MAPPING AND ABLATION      POPLITEAL ARTERY STENT  2013    RECTOPERITONEAL FISTULA CLOSURE      TOE SURGERY      Joint release    TONSILLECTOMY         History reviewed. No pertinent family  "history.    Social History     Social History    Marital status:      Spouse name: N/A    Number of children: N/A    Years of education: N/A     Occupational History    Retired       [employer]     Social History Main Topics    Smoking status: Former Smoker     Packs/day: 4.00     Years: 30.00     Types: Cigarettes     Quit date: 1/9/1994    Smokeless tobacco: Never Used    Alcohol use No    Drug use: No    Sexual activity: Not on file     Other Topics Concern    Not on file     Social History Narrative    No narrative on file       Vitals:    08/25/17 1553   BP: 120/73   Pulse: 80   Weight: 112.5 kg (248 lb 0.3 oz)   Height: 5' 6" (1.676 m)   PainSc: 0-No pain       Hemoglobin A1C   Date Value Ref Range Status   05/16/2017 7.3 (H) 4.5 - 6.2 % Final     Comment:     According to ADA guidelines, hemoglobin A1C <7.0% represents  optimal control in non-pregnant diabetic patients.  Different  metrics may apply to specific populations.   Standards of Medical Care in Diabetes - 2016.  For the purpose of screening for the presence of diabetes:  <5.7%     Consistent with the absence of diabetes  5.7-6.4%  Consistent with increasing risk for diabetes   (prediabetes)  >or=6.5%  Consistent with diabetes  Currently no consensus exists for use of hemoglobin A1C  for diagnosis of diabetes for children.     11/09/2016 6.9 (H) 4.5 - 6.2 % Final     Comment:     According to ADA guidelines, hemoglobin A1C <7.0% represents  optimal control in non-pregnant diabetic patients.  Different  metrics may apply to specific populations.   Standards of Medical Care in Diabetes - 2016.  For the purpose of screening for the presence of diabetes:  <5.7%     Consistent with the absence of diabetes  5.7-6.4%  Consistent with increasing risk for diabetes   (prediabetes)  >or=6.5%  Consistent with diabetes  Currently no consensus exists for use of hemoglobin A1C  for diagnosis of diabetes for children.     08/10/2016 8.1 (H) " 4.5 - 6.2 % Final     Comment:     According to ADA guidelines, hemoglobin A1C <7.0% represents  optimal control in non-pregnant diabetic patients.  Different  metrics may apply to specific populations.   Standards of Medical Care in Diabetes - 2016.  For the purpose of screening for the presence of diabetes:  <5.7%     Consistent with the absence of diabetes  5.7-6.4%  Consistent with increasing risk for diabetes   (prediabetes)  >or=6.5%  Consistent with diabetes  Currently no consensus exists for use of hemoglobin A1C  for diagnosis of diabetes for children.         Review of Systems   Constitutional: Negative for chills and fever.   Respiratory: Negative for shortness of breath.    Cardiovascular: Negative for chest pain, palpitations, orthopnea, claudication and leg swelling.   Gastrointestinal: Negative for diarrhea, nausea and vomiting.   Musculoskeletal: Negative for joint pain.   Skin: Negative for rash.   Neurological: Positive for tingling and sensory change. Negative for dizziness, focal weakness and weakness.   Endo/Heme/Allergies: Bruises/bleeds easily.   Psychiatric/Behavioral: Negative.          Objective:      PHYSICAL EXAM: Apperance: Alert and orient in no distress,well developed, and with good attention to grooming and body habits  Patient presents ambulating in diabetic shoes and inserts with dressing clean and intact to left foot.   Lower Extremity Exam  VASCULAR: Dorsalis pedis and Posterior tibial pulses palpable on left foot.  Capillary fill time <4 sec to toes on left foot.  Minimal edema observed left. Varicosities present left. Skin temperature of the lower extremities is warm to warm, proximal to distal. Hair growth absent left. (--) lymphangitis or (-) cellulitis noted left.  DERMATOLOGICAL: (-) edema, (-) erythema, (--) malodor, (-) drainage, (-) warmth to left foot. Previous ulcers noted to left medial hallux and plantar 1st submetatarsal. The dorsum surface of the feet are soft and  supple.  The plantar aspects of both feet are dry and scaly. Webspaces 1-3 clean, dry and without evidence of break in skin integrity left.   NEUROLOGICAL: Light touch, sharp-dull, proprioception all present and equal bilaterally.  Protective sensation absent sites as tested with a [place]-Jabari 5.07 monofilament.   MUSCULOSKELETAL: Muscle strength is 5/5 for foot inverters, everters, plantarflexors, and dorsiflexors. Muscle tone is normal.  Inspection/palpation of bone, joints and muscles unremarkable with the exception of that noted above. 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         Assessment:       Encounter Diagnoses   Name Primary?    Type II diabetes mellitus with peripheral circulatory disorder Yes    Pre-ulcerative calluses - Left Foot          Plan:   Type II diabetes mellitus with peripheral circulatory disorder    Pre-ulcerative calluses - Left Foot      I counseled the patient on his conditions, regarding findings of my examination, my impressions, and usual treatment plan.   The patient is alerted to watch for any signs of infection (redness, pus, pain, increased swelling or fever) and call if such occurs. Home wound care instructions are provided.  Patient to continue antibiotics as prescribed.   Patient to return in 1 month or sooner if needed.             Nic Castillo DPM  [facility]

## 2017-08-29 ENCOUNTER — ANTI-COAG VISIT (OUTPATIENT)
Dept: CARDIOLOGY | Facility: CLINIC | Age: 77
End: 2017-08-29

## 2017-08-29 LAB — INR PPP: 2.5

## 2017-09-22 ENCOUNTER — OFFICE VISIT (OUTPATIENT)
Dept: PODIATRY | Facility: CLINIC | Age: 77
End: 2017-09-22
Payer: COMMERCIAL

## 2017-09-22 ENCOUNTER — HOSPITAL ENCOUNTER (OUTPATIENT)
Dept: RADIOLOGY | Facility: HOSPITAL | Age: 77
Discharge: HOME | End: 2017-09-22
Attending: PODIATRIST
Payer: COMMERCIAL

## 2017-09-22 VITALS
DIASTOLIC BLOOD PRESSURE: 55 MMHG | WEIGHT: 249 LBS | HEART RATE: 73 BPM | HEIGHT: 66 IN | SYSTOLIC BLOOD PRESSURE: 117 MMHG | BODY MASS INDEX: 40.02 KG/M2

## 2017-09-22 DIAGNOSIS — M86.9 OSTEOMYELITIS OF ANKLE OR FOOT: ICD-10-CM

## 2017-09-22 DIAGNOSIS — L84 PRE-ULCERATIVE CALLUSES: ICD-10-CM

## 2017-09-22 DIAGNOSIS — E11.51 TYPE II DIABETES MELLITUS WITH PERIPHERAL CIRCULATORY DISORDER: ICD-10-CM

## 2017-09-22 DIAGNOSIS — L84 PRE-ULCERATIVE CALLUSES: Primary | ICD-10-CM

## 2017-09-22 PROCEDURE — 99999 PR PBB SHADOW E&M-EST. PATIENT-LVL III: CPT | Mod: PBBFAC,,, | Performed by: PODIATRIST

## 2017-09-22 PROCEDURE — 4010F ACE/ARB THERAPY RXD/TAKEN: CPT | Mod: S$GLB,,, | Performed by: PODIATRIST

## 2017-09-22 PROCEDURE — 1126F AMNT PAIN NOTED NONE PRSNT: CPT | Mod: S$GLB,,, | Performed by: PODIATRIST

## 2017-09-22 PROCEDURE — 73630 X-RAY EXAM OF FOOT: CPT | Mod: TC,PO,LT

## 2017-09-22 PROCEDURE — 1159F MED LIST DOCD IN RCRD: CPT | Mod: S$GLB,,, | Performed by: PODIATRIST

## 2017-09-22 PROCEDURE — 3074F SYST BP LT 130 MM HG: CPT | Mod: S$GLB,,, | Performed by: PODIATRIST

## 2017-09-22 PROCEDURE — 73630 X-RAY EXAM OF FOOT: CPT | Mod: 26,LT,, | Performed by: RADIOLOGY

## 2017-09-22 PROCEDURE — 3045F PR MOST RECENT HEMOGLOBIN A1C LEVEL 7.0-9.0%: CPT | Mod: S$GLB,,, | Performed by: PODIATRIST

## 2017-09-22 PROCEDURE — 99212 OFFICE O/P EST SF 10 MIN: CPT | Mod: S$GLB,,, | Performed by: PODIATRIST

## 2017-09-22 PROCEDURE — 3078F DIAST BP <80 MM HG: CPT | Mod: S$GLB,,, | Performed by: PODIATRIST

## 2017-09-22 PROCEDURE — 3008F BODY MASS INDEX DOCD: CPT | Mod: S$GLB,,, | Performed by: PODIATRIST

## 2017-09-28 ENCOUNTER — ANTI-COAG VISIT (OUTPATIENT)
Dept: CARDIOLOGY | Facility: CLINIC | Age: 77
End: 2017-09-28

## 2017-09-29 RX ORDER — GABAPENTIN 600 MG/1
TABLET ORAL
Qty: 180 TABLET | Refills: 3 | Status: SHIPPED | OUTPATIENT
Start: 2017-09-29 | End: 2018-03-08 | Stop reason: SDUPTHER

## 2017-10-27 ENCOUNTER — TELEPHONE (OUTPATIENT)
Dept: PODIATRY | Facility: CLINIC | Age: 77
End: 2017-10-27

## 2017-10-27 ENCOUNTER — OFFICE VISIT (OUTPATIENT)
Dept: PODIATRY | Facility: CLINIC | Age: 77
End: 2017-10-27
Payer: COMMERCIAL

## 2017-10-27 VITALS
DIASTOLIC BLOOD PRESSURE: 74 MMHG | WEIGHT: 251 LBS | SYSTOLIC BLOOD PRESSURE: 113 MMHG | HEIGHT: 66 IN | BODY MASS INDEX: 40.34 KG/M2 | HEART RATE: 90 BPM

## 2017-10-27 DIAGNOSIS — E11.51 TYPE II DIABETES MELLITUS WITH PERIPHERAL CIRCULATORY DISORDER: ICD-10-CM

## 2017-10-27 DIAGNOSIS — L84 PRE-ULCERATIVE CALLUSES: Primary | ICD-10-CM

## 2017-10-27 PROCEDURE — 99999 PR PBB SHADOW E&M-EST. PATIENT-LVL III: CPT | Mod: PBBFAC,,, | Performed by: PODIATRIST

## 2017-10-27 PROCEDURE — 99212 OFFICE O/P EST SF 10 MIN: CPT | Mod: S$GLB,,, | Performed by: PODIATRIST

## 2017-10-27 NOTE — PROGRESS NOTES
Subjective:     Patient ID: Raymond Stuart is a 77 y.o. male.    Chief Complaint: Follow-up (Left foot f/u. Patient rates the current pain in both feet 4/10.) and Diabetes Mellitus (Last PCP visit with - 5/22/17. 121 mg/dL-today's glucose. )    Raymond is a 77 y.o. male who presents to the clinic for evaluation and treatment of high risk feet. Raymond has a past medical history of Diabetes mellitus, type 2; Hyperlipidemia; Hypertension; Irregular heartbeat; PVD (peripheral vascular disease); S/P femoral-popliteal bypass surgery (8/8/2014); and S/P peripheral artery angioplasty (8/8/2014). The patient's chief complaint is diabetic foot ulcer, left foot. Patient states home health is still coming out. Patient denies any drainage from the foot. Patient states he was seen by Dr. Judge(vascular) with no new changes.  Patient states his blood sugar this morning was 121mg/dl. This patient has documented high risk feet requiring routine maintenance secondary to diabetes mellitis and those secondary complications of diabetes, as mentioned.     PCP: JOHNATHON Cristobal Jr, MD    Date Last Seen by PCP: 5/22/17    Current shoe gear:  Affected Foot: Rx diabetic extra depth shoes and custom accommodative insoles     Unaffected Foot: Rx diabetic extra depth shoes and custom accommodative insoles    History of Trauma: negative  Sign of Infection: none    Hemoglobin A1C   Date Value Ref Range Status   05/16/2017 7.3 (H) 4.5 - 6.2 % Final     Comment:     According to ADA guidelines, hemoglobin A1C <7.0% represents  optimal control in non-pregnant diabetic patients.  Different  metrics may apply to specific populations.   Standards of Medical Care in Diabetes - 2016.  For the purpose of screening for the presence of diabetes:  <5.7%     Consistent with the absence of diabetes  5.7-6.4%  Consistent with increasing risk for diabetes   (prediabetes)  >or=6.5%  Consistent with diabetes  Currently no consensus exists for use of  hemoglobin A1C  for diagnosis of diabetes for children.     11/09/2016 6.9 (H) 4.5 - 6.2 % Final     Comment:     According to ADA guidelines, hemoglobin A1C <7.0% represents  optimal control in non-pregnant diabetic patients.  Different  metrics may apply to specific populations.   Standards of Medical Care in Diabetes - 2016.  For the purpose of screening for the presence of diabetes:  <5.7%     Consistent with the absence of diabetes  5.7-6.4%  Consistent with increasing risk for diabetes   (prediabetes)  >or=6.5%  Consistent with diabetes  Currently no consensus exists for use of hemoglobin A1C  for diagnosis of diabetes for children.     08/10/2016 8.1 (H) 4.5 - 6.2 % Final     Comment:     According to ADA guidelines, hemoglobin A1C <7.0% represents  optimal control in non-pregnant diabetic patients.  Different  metrics may apply to specific populations.   Standards of Medical Care in Diabetes - 2016.  For the purpose of screening for the presence of diabetes:  <5.7%     Consistent with the absence of diabetes  5.7-6.4%  Consistent with increasing risk for diabetes   (prediabetes)  >or=6.5%  Consistent with diabetes  Currently no consensus exists for use of hemoglobin A1C  for diagnosis of diabetes for children.             Patient Active Problem List   Diagnosis    Hyperlipidemia associated with type 2 diabetes mellitus    Essential hypertension    A-fib    PVD (peripheral vascular disease)    Special screening for malignant neoplasms, colon    S/P femoral-popliteal bypass surgery    S/P peripheral artery angioplasty    Type 2 diabetes mellitus with microalbuminuria    Type 2 diabetes mellitus with diabetic neuropathy    Osteomyelitis of toe of left foot       Medication List with Changes/Refills   Current Medications    AMOXICILLIN-CLAVULANATE 500-125MG (AUGMENTIN) 500-125 MG TAB    Take 1 tablet by mouth 2 (two) times daily.    ATENOLOL (TENORMIN) 100 MG TABLET    Take 1 tablet (100 mg total) by  mouth once daily.    CEPHALEXIN (KEFLEX) 500 MG CAPSULE        CLOPIDOGREL (PLAVIX) 75 MG TABLET    Take 1 tablet by mouth  daily    DAPTOMYCIN (CUBICIN) INJECTION        DIPHENHYDRAMINE (BENADRYL) 50 MG/ML INJECTION        DOXYCYCLINE (VIBRAMYCIN) 100 MG CAP        FENOFIBRATE MICRONIZED (LOFIBRA) 134 MG CAP    Take 1 capsule (134 mg total) by mouth once daily.    FLUCONAZOLE (DIFLUCAN) 100 MG TABLET    Take 100 mg by mouth once daily.    GABAPENTIN (NEURONTIN) 600 MG TABLET    TAKE 2 TABLETS BY MOUTH TWO TIMES DAILY    GLIPIZIDE-METFORMIN (METAGLIP) 2.5-500 MG PER TABLET    Take 2 tablets by mouth  twice a day before meals in the morning and evening    HEPARIN, PORCINE, PF, (HEPARIN FLUSH 100 UNITS/ML) 100 UNIT/ML SYRG        HYDROCODONE-ACETAMINOPHEN 7.5-325MG (NORCO) 7.5-325 MG PER TABLET    Take 1 tablet by mouth every 6 (six) hours as needed for Pain.    INVANZ 1 GRAM INJECTION        LISINOPRIL-HYDROCHLOROTHIAZIDE (PRINZIDE,ZESTORETIC) 20-12.5 MG PER TABLET    Take 2 tablets by mouth once daily.    MINOCYCLINE (MINOCIN,DYNACIN) 100 MG CAPSULE    Take 100 mg by mouth.     NIACIN 1,000 MG TBSR    Take 1 tablet by mouth Daily.    NORMAL SALINE FLUSH 0.9 % INJECTION        PREDNISONE (DELTASONE) 20 MG TABLET        ROSUVASTATIN (CRESTOR) 40 MG TAB    Take 1 tablet (40 mg total) by mouth once daily.    SANTYL OINTMENT        VANCOMYCIN HCL IN DEXTROSE 5 % (VANCOMYCIN IN DEXTROSE 5 %) 1 GRAM/200 ML PGBK        WARFARIN (COUMADIN) 5 MG TABLET    TAKE 1 AND 1/2 TABLETS BY  MOUTH ON WEDNESDAY AND  SUNDAY AND 1 TABLET ALL  REMAINING DAY AS DIRECTED  BY THE COUMADIN CLINIC.       Review of patient's allergies indicates:   Allergen Reactions    Sulfa (sulfonamide antibiotics)      Other reaction(s): Stomach upset    Sulfamethoxazole Rash    Trimethoprim Rash     Bactrim         Past Surgical History:   Procedure Laterality Date    CARDIAC ELECTROPHYSIOLOGY MAPPING AND ABLATION      POPLITEAL ARTERY STENT  2013     "RECTOPERITONEAL FISTULA CLOSURE      TOE SURGERY      Joint release    TONSILLECTOMY         History reviewed. No pertinent family history.    Social History     Social History    Marital status:      Spouse name: N/A    Number of children: N/A    Years of education: N/A     Occupational History    Retired [employer]     Social History Main Topics    Smoking status: Former Smoker     Packs/day: 4.00     Years: 30.00     Types: Cigarettes     Quit date: 1/9/1994    Smokeless tobacco: Never Used    Alcohol use No    Drug use: No    Sexual activity: Not on file     Other Topics Concern    Not on file     Social History Narrative    No narrative on file       Vitals:    10/27/17 1535   BP: 113/74   Pulse: 90   Weight: 113.9 kg (251 lb)   Height: 5' 6" (1.676 m)   PainSc:   4   PainLoc: Foot       Hemoglobin A1C   Date Value Ref Range Status   05/16/2017 7.3 (H) 4.5 - 6.2 % Final     Comment:     According to ADA guidelines, hemoglobin A1C <7.0% represents  optimal control in non-pregnant diabetic patients.  Different  metrics may apply to specific populations.   Standards of Medical Care in Diabetes - 2016.  For the purpose of screening for the presence of diabetes:  <5.7%     Consistent with the absence of diabetes  5.7-6.4%  Consistent with increasing risk for diabetes   (prediabetes)  >or=6.5%  Consistent with diabetes  Currently no consensus exists for use of hemoglobin A1C  for diagnosis of diabetes for children.     11/09/2016 6.9 (H) 4.5 - 6.2 % Final     Comment:     According to ADA guidelines, hemoglobin A1C <7.0% represents  optimal control in non-pregnant diabetic patients.  Different  metrics may apply to specific populations.   Standards of Medical Care in Diabetes - 2016.  For the purpose of screening for the presence of diabetes:  <5.7%     Consistent with the absence of diabetes  5.7-6.4%  Consistent with increasing risk for diabetes   (prediabetes)  >or=6.5%  Consistent " with diabetes  Currently no consensus exists for use of hemoglobin A1C  for diagnosis of diabetes for children.     08/10/2016 8.1 (H) 4.5 - 6.2 % Final     Comment:     According to ADA guidelines, hemoglobin A1C <7.0% represents  optimal control in non-pregnant diabetic patients.  Different  metrics may apply to specific populations.   Standards of Medical Care in Diabetes - 2016.  For the purpose of screening for the presence of diabetes:  <5.7%     Consistent with the absence of diabetes  5.7-6.4%  Consistent with increasing risk for diabetes   (prediabetes)  >or=6.5%  Consistent with diabetes  Currently no consensus exists for use of hemoglobin A1C  for diagnosis of diabetes for children.         Review of Systems   Constitutional: Negative for chills and fever.   Respiratory: Negative for shortness of breath.    Cardiovascular: Negative for chest pain, palpitations, orthopnea, claudication and leg swelling.   Gastrointestinal: Negative for diarrhea, nausea and vomiting.   Musculoskeletal: Negative for joint pain.   Skin: Negative for rash.   Neurological: Positive for tingling and sensory change. Negative for dizziness, focal weakness and weakness.   Endo/Heme/Allergies: Bruises/bleeds easily.   Psychiatric/Behavioral: Negative.          Objective:      PHYSICAL EXAM: Apperance: Alert and orient in no distress,well developed, and with good attention to grooming and body habits  Patient presents ambulating in diabetic shoes and inserts with dressing clean and intact to left foot.   Lower Extremity Exam  VASCULAR: Dorsalis pedis and Posterior tibial pulses palpable on left foot.  Capillary fill time <4 sec to toes on left foot.  Minimal edema observed left. Varicosities present left. Skin temperature of the lower extremities is warm to warm, proximal to distal. Hair growth absent left. (--) lymphangitis or (-) cellulitis noted left.  DERMATOLOGICAL: (-) edema, (-) erythema, (--) malodor, (-) drainage, (-) warmth  to left foot. Previous ulcers noted to left medial hallux and plantar 1st submetatarsal closed. No erythema, drainage, or increased temp noted.  The dorsum surface of the feet are soft and supple.  The plantar aspects of both feet are dry and scaly. Webspaces 1-3 clean, dry and without evidence of break in skin integrity left.   NEUROLOGICAL: Light touch, sharp-dull, proprioception all present and equal bilaterally.  Protective sensation absent sites as tested with a [place]-Jabari 5.07 monofilament.   MUSCULOSKELETAL: Muscle strength is 5/5 for foot inverters, everters, plantarflexors, and dorsiflexors. Muscle tone is normal.  Inspection/palpation of bone, joints and muscles unremarkable with the exception of that noted above. 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         Assessment:       Encounter Diagnoses   Name Primary?    Pre-ulcerative calluses - Left Foot Yes    Type II diabetes mellitus with peripheral circulatory disorder          Plan:   Pre-ulcerative calluses - Left Foot    Type II diabetes mellitus with peripheral circulatory disorder      I counseled the patient on his conditions, regarding findings of my examination, my impressions, and usual treatment plan.   Contacted [facility] to discontinue wound care.   The patient is alerted to watch for any signs of infection (redness, pus, pain, increased swelling or fever) and call if such occurs. Home wound care instructions are provided.  Patient to return in 2 months or sooner if needed.             Nic Castillo DPM  [facility]

## 2017-10-27 NOTE — TELEPHONE ENCOUNTER
Contacted [facility] regarding ' home health visits. After speaking with  I informed them that the home health visits can stop after Tuesday, 10/31/17. There was an understanding stated and the call ended well.

## 2017-11-15 ENCOUNTER — LAB VISIT (OUTPATIENT)
Dept: LAB | Facility: HOSPITAL | Age: 77
End: 2017-11-15
Attending: PEDIATRICS
Payer: COMMERCIAL

## 2017-11-15 DIAGNOSIS — E11.69 HYPERLIPIDEMIA ASSOCIATED WITH TYPE 2 DIABETES MELLITUS: ICD-10-CM

## 2017-11-15 DIAGNOSIS — E78.5 HYPERLIPIDEMIA ASSOCIATED WITH TYPE 2 DIABETES MELLITUS: ICD-10-CM

## 2017-11-15 DIAGNOSIS — E11.40 TYPE 2 DIABETES MELLITUS WITH DIABETIC NEUROPATHY, WITHOUT LONG-TERM CURRENT USE OF INSULIN: ICD-10-CM

## 2017-11-15 LAB
ALT SERPL W/O P-5'-P-CCNC: 16 U/L
ANION GAP SERPL CALC-SCNC: 8 MMOL/L
AST SERPL-CCNC: 15 U/L
BUN SERPL-MCNC: 28 MG/DL
CALCIUM SERPL-MCNC: 10.1 MG/DL
CHLORIDE SERPL-SCNC: 105 MMOL/L
CHOLEST SERPL-MCNC: 107 MG/DL
CHOLEST/HDLC SERPL: 4 {RATIO}
CO2 SERPL-SCNC: 29 MMOL/L
CREAT SERPL-MCNC: 1.4 MG/DL
EST. GFR  (AFRICAN AMERICAN): 56.4 ML/MIN/1.73 M^2
EST. GFR  (NON AFRICAN AMERICAN): 48.8 ML/MIN/1.73 M^2
ESTIMATED AVG GLUCOSE: 148 MG/DL
GLUCOSE SERPL-MCNC: 119 MG/DL
HBA1C MFR BLD HPLC: 6.8 %
HDLC SERPL-MCNC: 27 MG/DL
HDLC SERPL: 25.2 %
LDLC SERPL CALC-MCNC: 55 MG/DL
NONHDLC SERPL-MCNC: 80 MG/DL
POTASSIUM SERPL-SCNC: 4.1 MMOL/L
SODIUM SERPL-SCNC: 142 MMOL/L
TRIGL SERPL-MCNC: 125 MG/DL

## 2017-11-15 PROCEDURE — 36415 COLL VENOUS BLD VENIPUNCTURE: CPT | Mod: PO

## 2017-11-15 PROCEDURE — 84460 ALANINE AMINO (ALT) (SGPT): CPT

## 2017-11-15 PROCEDURE — 80061 LIPID PANEL: CPT

## 2017-11-15 PROCEDURE — 83036 HEMOGLOBIN GLYCOSYLATED A1C: CPT

## 2017-11-15 PROCEDURE — 80048 BASIC METABOLIC PNL TOTAL CA: CPT

## 2017-11-15 PROCEDURE — 84450 TRANSFERASE (AST) (SGOT): CPT

## 2017-11-30 ENCOUNTER — OFFICE VISIT (OUTPATIENT)
Dept: INTERNAL MEDICINE | Facility: CLINIC | Age: 77
End: 2017-11-30
Payer: COMMERCIAL

## 2017-11-30 VITALS
DIASTOLIC BLOOD PRESSURE: 62 MMHG | TEMPERATURE: 97 F | SYSTOLIC BLOOD PRESSURE: 132 MMHG | OXYGEN SATURATION: 98 % | HEART RATE: 79 BPM | HEIGHT: 66 IN | BODY MASS INDEX: 40.43 KG/M2 | WEIGHT: 251.56 LBS

## 2017-11-30 DIAGNOSIS — E11.69 HYPERLIPIDEMIA ASSOCIATED WITH TYPE 2 DIABETES MELLITUS: ICD-10-CM

## 2017-11-30 DIAGNOSIS — R80.9 TYPE 2 DIABETES MELLITUS WITH MICROALBUMINURIA, WITHOUT LONG-TERM CURRENT USE OF INSULIN: Primary | ICD-10-CM

## 2017-11-30 DIAGNOSIS — E11.40 TYPE 2 DIABETES MELLITUS WITH DIABETIC NEUROPATHY, WITHOUT LONG-TERM CURRENT USE OF INSULIN: ICD-10-CM

## 2017-11-30 DIAGNOSIS — I48.21 PERMANENT ATRIAL FIBRILLATION: ICD-10-CM

## 2017-11-30 DIAGNOSIS — I73.9 PVD (PERIPHERAL VASCULAR DISEASE): ICD-10-CM

## 2017-11-30 DIAGNOSIS — E11.29 TYPE 2 DIABETES MELLITUS WITH MICROALBUMINURIA, WITHOUT LONG-TERM CURRENT USE OF INSULIN: Primary | ICD-10-CM

## 2017-11-30 DIAGNOSIS — Z98.62 S/P PERIPHERAL ARTERY ANGIOPLASTY: ICD-10-CM

## 2017-11-30 DIAGNOSIS — E78.5 HYPERLIPIDEMIA ASSOCIATED WITH TYPE 2 DIABETES MELLITUS: ICD-10-CM

## 2017-11-30 DIAGNOSIS — I10 ESSENTIAL HYPERTENSION: ICD-10-CM

## 2017-11-30 DIAGNOSIS — N42.9 PROSTATE DISEASE: ICD-10-CM

## 2017-11-30 PROCEDURE — 90662 IIV NO PRSV INCREASED AG IM: CPT | Mod: S$GLB,,, | Performed by: PEDIATRICS

## 2017-11-30 PROCEDURE — 90471 IMMUNIZATION ADMIN: CPT | Mod: S$GLB,,, | Performed by: PEDIATRICS

## 2017-11-30 PROCEDURE — 99999 PR PBB SHADOW E&M-EST. PATIENT-LVL III: CPT | Mod: PBBFAC,,, | Performed by: PEDIATRICS

## 2017-11-30 PROCEDURE — 99214 OFFICE O/P EST MOD 30 MIN: CPT | Mod: 25,S$GLB,, | Performed by: PEDIATRICS

## 2017-12-05 RX ORDER — LANCETS
EACH MISCELLANEOUS
Qty: 100 EACH | Refills: 3 | Status: SHIPPED | OUTPATIENT
Start: 2017-12-05

## 2017-12-05 RX ORDER — INSULIN PUMP SYRINGE, 3 ML
EACH MISCELLANEOUS
Qty: 1 EACH | Refills: 0 | Status: SHIPPED | OUTPATIENT
Start: 2017-12-05 | End: 2018-12-20

## 2017-12-05 NOTE — TELEPHONE ENCOUNTER
Patient came by the office this morning. He states that his insurance no longer covers his diabetic meter/strips/lancets. Advised that I would request a new meter that is covered, and send script to his local pharmacy. He verbalized understanding.

## 2017-12-07 ENCOUNTER — PATIENT MESSAGE (OUTPATIENT)
Dept: PODIATRY | Facility: CLINIC | Age: 77
End: 2017-12-07

## 2017-12-08 ENCOUNTER — PATIENT MESSAGE (OUTPATIENT)
Dept: PODIATRY | Facility: CLINIC | Age: 77
End: 2017-12-08

## 2017-12-08 ENCOUNTER — TELEPHONE (OUTPATIENT)
Dept: INTERNAL MEDICINE | Facility: CLINIC | Age: 77
End: 2017-12-08

## 2017-12-08 NOTE — TELEPHONE ENCOUNTER
S/w Pharmacist Rani morrow/ [facility], confirmed Sig BID and PRN for Blood glucose testing, and also to dispense insurance preferred meter w/ strips and supplies in 3-month supply w/ 3 refills, she voiced understanding.

## 2017-12-08 NOTE — TELEPHONE ENCOUNTER
----- Message from Kanwal Barnett sent at 12/8/2017  9:05 AM CST -----  Contact: Kyara/[facility]  Kyara called to get clarification for the prescription for test strips. She can be contacted at [phone]. Ref# [id number].    Thanks,  Kanwal

## 2017-12-11 ENCOUNTER — OFFICE VISIT (OUTPATIENT)
Dept: PODIATRY | Facility: CLINIC | Age: 77
End: 2017-12-11
Payer: COMMERCIAL

## 2017-12-11 ENCOUNTER — HOSPITAL ENCOUNTER (OUTPATIENT)
Dept: RADIOLOGY | Facility: HOSPITAL | Age: 77
Discharge: HOME | End: 2017-12-11
Attending: PODIATRIST
Payer: COMMERCIAL

## 2017-12-11 VITALS
HEIGHT: 66 IN | SYSTOLIC BLOOD PRESSURE: 125 MMHG | WEIGHT: 248.56 LBS | BODY MASS INDEX: 39.94 KG/M2 | HEART RATE: 87 BPM | DIASTOLIC BLOOD PRESSURE: 66 MMHG

## 2017-12-11 DIAGNOSIS — I73.9 PVD (PERIPHERAL VASCULAR DISEASE): ICD-10-CM

## 2017-12-11 DIAGNOSIS — E11.51 TYPE II DIABETES MELLITUS WITH PERIPHERAL CIRCULATORY DISORDER: ICD-10-CM

## 2017-12-11 DIAGNOSIS — S90.425A TOE BLISTER WITHOUT INFECTION, LEFT, INITIAL ENCOUNTER: ICD-10-CM

## 2017-12-11 DIAGNOSIS — S90.425A TOE BLISTER WITHOUT INFECTION, LEFT, INITIAL ENCOUNTER: Primary | ICD-10-CM

## 2017-12-11 PROCEDURE — 99213 OFFICE O/P EST LOW 20 MIN: CPT | Mod: S$GLB,,, | Performed by: PODIATRIST

## 2017-12-11 PROCEDURE — 73630 X-RAY EXAM OF FOOT: CPT | Mod: 26,LT,, | Performed by: RADIOLOGY

## 2017-12-11 PROCEDURE — 73630 X-RAY EXAM OF FOOT: CPT | Mod: TC,LT

## 2017-12-11 PROCEDURE — 99999 PR PBB SHADOW E&M-EST. PATIENT-LVL III: CPT | Mod: PBBFAC,,, | Performed by: PODIATRIST

## 2017-12-11 NOTE — PROGRESS NOTES
Subjective:     Patient ID: Raymond Stuart is a 77 y.o. male.    Chief Complaint: Toe Check (diabetic patient, right great toe, patient denied current pain)    Raymond is a 77 y.o. male who presents to the clinic for evaluation and treatment of high risk feet. Raymond has a past medical history of Diabetes mellitus, type 2; Hyperlipidemia; Hypertension; Irregular heartbeat; PVD (peripheral vascular disease); S/P femoral-popliteal bypass surgery (8/8/2014); and S/P peripheral artery angioplasty (8/8/2014). The patient's chief complaint is blister on his left big toe. Patient states he noticed blister Monday night and notice blood drainage from it Friday night. Patient states he cleaned it with wound cleanser and applied Betadine, gauze, and paper tape.  This patient has documented high risk feet requiring routine maintenance secondary to peripheral vascular disease.    PCP: JOHNATHON Cristobal Jr, MD    Date Last Seen by PCP: 11/30/17    Current shoe gear:  Affected Foot: Rx diabetic extra depth shoes and custom accommodative insoles     Unaffected Foot: Rx diabetic extra depth shoes and custom accommodative insoles    History of Trauma: negative  Sign of Infection: none    Hemoglobin A1C   Date Value Ref Range Status   11/15/2017 6.8 (H) 4.0 - 5.6 % Final     Comment:     According to ADA guidelines, hemoglobin A1c <7.0% represents  optimal control in non-pregnant diabetic patients. Different  metrics may apply to specific patient populations.   Standards of Medical Care in Diabetes-2016.  For the purpose of screening for the presence of diabetes:  <5.7%     Consistent with the absence of diabetes  5.7-6.4%  Consistent with increasing risk for diabetes   (prediabetes)  >or=6.5%  Consistent with diabetes  Currently, no consensus exists for use of hemoglobin A1c  for diagnosis of diabetes for children.  This Hemoglobin A1c assay has significant interference with fetal   hemoglobin   (HbF). The results are invalid for patients  with abnormal amounts of   HbF,   including those with known Hereditary Persistence   of Fetal Hemoglobin. Heterozygous hemoglobin variants (HbAS, HbAC,   HbAD, HbAE, HbA2) do not significantly interfere with this assay;   however, presence of multiple variants in a sample may impact the %   interference.     05/16/2017 7.3 (H) 4.5 - 6.2 % Final     Comment:     According to ADA guidelines, hemoglobin A1C <7.0% represents  optimal control in non-pregnant diabetic patients.  Different  metrics may apply to specific populations.   Standards of Medical Care in Diabetes - 2016.  For the purpose of screening for the presence of diabetes:  <5.7%     Consistent with the absence of diabetes  5.7-6.4%  Consistent with increasing risk for diabetes   (prediabetes)  >or=6.5%  Consistent with diabetes  Currently no consensus exists for use of hemoglobin A1C  for diagnosis of diabetes for children.     11/09/2016 6.9 (H) 4.5 - 6.2 % Final     Comment:     According to ADA guidelines, hemoglobin A1C <7.0% represents  optimal control in non-pregnant diabetic patients.  Different  metrics may apply to specific populations.   Standards of Medical Care in Diabetes - 2016.  For the purpose of screening for the presence of diabetes:  <5.7%     Consistent with the absence of diabetes  5.7-6.4%  Consistent with increasing risk for diabetes   (prediabetes)  >or=6.5%  Consistent with diabetes  Currently no consensus exists for use of hemoglobin A1C  for diagnosis of diabetes for children.             Patient Active Problem List   Diagnosis    Hyperlipidemia associated with type 2 diabetes mellitus    Essential hypertension    A-fib    PVD (peripheral vascular disease)    Special screening for malignant neoplasms, colon    S/P femoral-popliteal bypass surgery    S/P peripheral artery angioplasty    Type 2 diabetes mellitus with microalbuminuria    Type 2 diabetes mellitus with diabetic neuropathy    Osteomyelitis of toe of left  foot       Medication List with Changes/Refills   Current Medications    ATENOLOL (TENORMIN) 100 MG TABLET    Take 1 tablet (100 mg total) by mouth once daily.    BLOOD-GLUCOSE METER KIT    To check BG 2 times daily, to use with insurance preferred meter    CLOPIDOGREL (PLAVIX) 75 MG TABLET    Take 1 tablet by mouth  daily    DAPTOMYCIN (CUBICIN) INJECTION        DIPHENHYDRAMINE (BENADRYL) 50 MG/ML INJECTION        FENOFIBRATE MICRONIZED (LOFIBRA) 134 MG CAP    Take 1 capsule (134 mg total) by mouth once daily.    FLUCONAZOLE (DIFLUCAN) 100 MG TABLET    Take 100 mg by mouth once daily.    GABAPENTIN (NEURONTIN) 600 MG TABLET    TAKE 2 TABLETS BY MOUTH TWO TIMES DAILY    GLIPIZIDE-METFORMIN (METAGLIP) 2.5-500 MG PER TABLET    Take 2 tablets by mouth  twice a day before meals in the morning and evening    HYDROCODONE-ACETAMINOPHEN 7.5-325MG (NORCO) 7.5-325 MG PER TABLET    Take 1 tablet by mouth every 6 (six) hours as needed for Pain.    LANCETS MISC    To check BG 2 times daily, to use with insurance preferred meter    LISINOPRIL-HYDROCHLOROTHIAZIDE (PRINZIDE,ZESTORETIC) 20-12.5 MG PER TABLET    Take 2 tablets by mouth once daily.    PREDNISONE (DELTASONE) 20 MG TABLET        ROSUVASTATIN (CRESTOR) 40 MG TAB    Take 1 tablet (40 mg total) by mouth once daily.    SANTYL OINTMENT        WARFARIN (COUMADIN) 5 MG TABLET    TAKE 1 AND 1/2 TABLETS BY  MOUTH ON WEDNESDAY AND  SUNDAY AND 1 TABLET ALL  REMAINING DAY AS DIRECTED  BY THE [facility]IN CLINIC.       Review of patient's allergies indicates:   Allergen Reactions    Sulfa (sulfonamide antibiotics)      Other reaction(s): Stomach upset    Sulfamethoxazole Rash    Trimethoprim Rash     Bactrim         Past Surgical History:   Procedure Laterality Date    CARDIAC ELECTROPHYSIOLOGY MAPPING AND ABLATION      POPLITEAL ARTERY STENT  2013    RECTOPERITONEAL FISTULA CLOSURE      TOE SURGERY      Joint release    TONSILLECTOMY         History reviewed. No pertinent  "family history.    Social History     Social History    Marital status:      Spouse name: N/A    Number of children: N/A    Years of education: N/A     Occupational History    Retired       [employer]     Social History Main Topics    Smoking status: Former Smoker     Packs/day: 4.00     Years: 30.00     Types: Cigarettes     Quit date: 1/9/1994    Smokeless tobacco: Never Used    Alcohol use No    Drug use: No    Sexual activity: Not on file     Other Topics Concern    Not on file     Social History Narrative    No narrative on file       Vitals:    12/11/17 0851   BP: 125/66   Pulse: 87   Weight: 112.8 kg (248 lb 9.1 oz)   Height: 5' 6" (1.676 m)   PainSc: 0-No pain       Hemoglobin A1C   Date Value Ref Range Status   11/15/2017 6.8 (H) 4.0 - 5.6 % Final     Comment:     According to ADA guidelines, hemoglobin A1c <7.0% represents  optimal control in non-pregnant diabetic patients. Different  metrics may apply to specific patient populations.   Standards of Medical Care in Diabetes-2016.  For the purpose of screening for the presence of diabetes:  <5.7%     Consistent with the absence of diabetes  5.7-6.4%  Consistent with increasing risk for diabetes   (prediabetes)  >or=6.5%  Consistent with diabetes  Currently, no consensus exists for use of hemoglobin A1c  for diagnosis of diabetes for children.  This Hemoglobin A1c assay has significant interference with fetal   hemoglobin   (HbF). The results are invalid for patients with abnormal amounts of   HbF,   including those with known Hereditary Persistence   of Fetal Hemoglobin. Heterozygous hemoglobin variants (HbAS, HbAC,   HbAD, HbAE, HbA2) do not significantly interfere with this assay;   however, presence of multiple variants in a sample may impact the %   interference.     05/16/2017 7.3 (H) 4.5 - 6.2 % Final     Comment:     According to ADA guidelines, hemoglobin A1C <7.0% represents  optimal control in non-pregnant diabetic patients. "  Different  metrics may apply to specific populations.   Standards of Medical Care in Diabetes - 2016.  For the purpose of screening for the presence of diabetes:  <5.7%     Consistent with the absence of diabetes  5.7-6.4%  Consistent with increasing risk for diabetes   (prediabetes)  >or=6.5%  Consistent with diabetes  Currently no consensus exists for use of hemoglobin A1C  for diagnosis of diabetes for children.     11/09/2016 6.9 (H) 4.5 - 6.2 % Final     Comment:     According to ADA guidelines, hemoglobin A1C <7.0% represents  optimal control in non-pregnant diabetic patients.  Different  metrics may apply to specific populations.   Standards of Medical Care in Diabetes - 2016.  For the purpose of screening for the presence of diabetes:  <5.7%     Consistent with the absence of diabetes  5.7-6.4%  Consistent with increasing risk for diabetes   (prediabetes)  >or=6.5%  Consistent with diabetes  Currently no consensus exists for use of hemoglobin A1C  for diagnosis of diabetes for children.         Review of Systems   Constitutional: Negative for chills and fever.   Respiratory: Negative for shortness of breath.    Cardiovascular: Negative for chest pain, palpitations, orthopnea, claudication and leg swelling.   Gastrointestinal: Negative for diarrhea, nausea and vomiting.   Musculoskeletal: Negative for joint pain.   Skin: Negative for rash.   Neurological: Positive for sensory change. Negative for dizziness, tingling, focal weakness and weakness.   Psychiatric/Behavioral: Negative.          Objective:      PHYSICAL EXAM: Apperance: Alert and orient in no distress,well developed, and with good attention to grooming and body habits  Patient presents ambulating in diabetic shoes and inserts.  Lower Extremity Exam  VASCULAR: Dorsalis pedis pulses 1/4 bilateral and Posterior Tibial pulses 1/4 bilateral. Capillary fill time <4 seconds bilateral. No edema observed bilateral. Varicosities present bilateral. Skin  temperature of the lower extremities is warm to warm, proximal to distal. Hair growth absent bilateral. (--) lymphangitis or (--) cellulitis noted bilateral.  DERMATOLOGICAL: (--) edema, (--) erythema, (--) malodor, (+) blood drainage, (--) warmth to left foot. Intact blood blister noted to dorsal medial hallux. The dorsum surface of the feet are soft and supple.  The plantar aspects of both feet are dry and scaly. Webspaces 1,2,3,4 clean, dry and without evidence of break in skin integrity bilateral.   NEUROLOGICAL: Light touch, sharp-dull, proprioception all present and equal bilaterally.    MUSCULOSKELETAL: Muscle strength is 5/5 for foot inverters, everters, plantarflexors, and dorsiflexors. Muscle tone is normal.  Inspection/palpation of bone, joints and muscles unremarkable with the exception of that noted above. 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                Assessment:       Encounter Diagnoses   Name Primary?    Toe blister without infection, left, initial encounter Yes    Type II diabetes mellitus with peripheral circulatory disorder     PVD (peripheral vascular disease)          Plan:   Toe blister without infection, left, initial encounter  -     X-Ray Foot Complete Left; Future; Expected date: 12/11/2017    Type II diabetes mellitus with peripheral circulatory disorder    PVD (peripheral vascular disease)      I counseled the patient on his conditions, regarding findings of my examination, my impressions, and usual treatment plan.   Ordered left foot x-rays.  With patient's permission, the left toe blister was dressed with Betadine and mepliex pad. Patient was given instructions on daily dressing changes. Patient was also instructed on the importance of keeping the wound and dressings clean dry and intact and keeping pressure off the wound area until complete healing of the wound.The patient is alerted to watch for any signs of infection (redness, pus, pain, increased swelling or fever) and call if such  occurs. Home wound care instructions are provided.  Patient to return at already scheduled appointment 12/29/17 or sooner if needed.                 Nic Castillo DPM  [facility]

## 2017-12-18 ENCOUNTER — OFFICE VISIT (OUTPATIENT)
Dept: OPHTHALMOLOGY | Facility: CLINIC | Age: 77
End: 2017-12-18
Payer: COMMERCIAL

## 2017-12-18 DIAGNOSIS — E11.9 DIABETES MELLITUS TYPE 2 WITHOUT RETINOPATHY: Primary | ICD-10-CM

## 2017-12-18 DIAGNOSIS — Z13.5 GLAUCOMA SCREENING: ICD-10-CM

## 2017-12-18 DIAGNOSIS — H52.7 REFRACTIVE ERROR: ICD-10-CM

## 2017-12-18 DIAGNOSIS — H25.13 CATARACT, NUCLEAR SCLEROTIC SENILE, BILATERAL: ICD-10-CM

## 2017-12-18 PROCEDURE — 92015 DETERMINE REFRACTIVE STATE: CPT | Mod: S$GLB,,, | Performed by: OPTOMETRIST

## 2017-12-18 PROCEDURE — 99999 PR PBB SHADOW E&M-EST. PATIENT-LVL I: CPT | Mod: PBBFAC,,, | Performed by: OPTOMETRIST

## 2017-12-18 PROCEDURE — 92014 COMPRE OPH EXAM EST PT 1/>: CPT | Mod: S$GLB,,, | Performed by: OPTOMETRIST

## 2017-12-18 NOTE — PROGRESS NOTES
HPI     Last [facility] exam 12/15/2016  Diabetic/NIDDM   12/18/2017  Cataract, nuclear, bilateral  Screening for glaucoma  RE  Uses OTC Readers did not bring to exam      Last edited by Sergei Fagan MA on 12/18/2017  8:59 AM. (History)            Assessment /Plan     For exam results, see Encounter Report.    Diabetes mellitus type 2 without retinopathy    Cataract, nuclear sclerotic senile, bilateral    Glaucoma screening    Refractive error      No diabetic retinopathy OU.  Mild to moderate NS cataracts OU = discussed and will follow.  OH OK OU otherwise.  Spec Rx versus OTC.  RTC one year.

## 2017-12-29 ENCOUNTER — OFFICE VISIT (OUTPATIENT)
Dept: PODIATRY | Facility: CLINIC | Age: 77
End: 2017-12-29
Payer: COMMERCIAL

## 2017-12-29 VITALS
SYSTOLIC BLOOD PRESSURE: 128 MMHG | BODY MASS INDEX: 39.82 KG/M2 | WEIGHT: 247.81 LBS | HEIGHT: 66 IN | DIASTOLIC BLOOD PRESSURE: 64 MMHG | HEART RATE: 85 BPM

## 2017-12-29 DIAGNOSIS — E11.51 TYPE II DIABETES MELLITUS WITH PERIPHERAL CIRCULATORY DISORDER: ICD-10-CM

## 2017-12-29 DIAGNOSIS — S90.425D: Primary | ICD-10-CM

## 2017-12-29 PROCEDURE — 99999 PR PBB SHADOW E&M-EST. PATIENT-LVL III: CPT | Mod: PBBFAC,,, | Performed by: PODIATRIST

## 2017-12-29 PROCEDURE — 99212 OFFICE O/P EST SF 10 MIN: CPT | Mod: S$GLB,,, | Performed by: PODIATRIST

## 2018-01-15 NOTE — PROGRESS NOTES
Subjective:     Patient ID: Raymond Stuart is a 77 y.o. male.    Chief Complaint: Foot Problem (diabetic patient, left foot )    Raymond is a 77 y.o. male who presents to the clinic for evaluation and treatment of high risk feet. Raymond has a past medical history of Diabetes mellitus, type 2; Hyperlipidemia; Hypertension; Irregular heartbeat; PVD (peripheral vascular disease); S/P femoral-popliteal bypass surgery (8/8/2014); and S/P peripheral artery angioplasty (8/8/2014). The patient's chief complaint is blister on his left big toe. Patient states he applied the betadine as instructed and the skin peeled off some. This patient has documented high risk feet requiring routine maintenance secondary to peripheral vascular disease.    PCP: JOHNATHON Cristobal Jr, MD    Date Last Seen by PCP: 11/30/17    Current shoe gear:  Affected Foot: Rx diabetic extra depth shoes and custom accommodative insoles     Unaffected Foot: Rx diabetic extra depth shoes and custom accommodative insoles    History of Trauma: negative  Sign of Infection: none    Hemoglobin A1C   Date Value Ref Range Status   11/15/2017 6.8 (H) 4.0 - 5.6 % Final     Comment:     According to ADA guidelines, hemoglobin A1c <7.0% represents  optimal control in non-pregnant diabetic patients. Different  metrics may apply to specific patient populations.   Standards of Medical Care in Diabetes-2016.  For the purpose of screening for the presence of diabetes:  <5.7%     Consistent with the absence of diabetes  5.7-6.4%  Consistent with increasing risk for diabetes   (prediabetes)  >or=6.5%  Consistent with diabetes  Currently, no consensus exists for use of hemoglobin A1c  for diagnosis of diabetes for children.  This Hemoglobin A1c assay has significant interference with fetal   hemoglobin   (HbF). The results are invalid for patients with abnormal amounts of   HbF,   including those with known Hereditary Persistence   of Fetal Hemoglobin. Heterozygous hemoglobin variants  (HbAS, HbAC,   HbAD, HbAE, HbA2) do not significantly interfere with this assay;   however, presence of multiple variants in a sample may impact the %   interference.     05/16/2017 7.3 (H) 4.5 - 6.2 % Final     Comment:     According to ADA guidelines, hemoglobin A1C <7.0% represents  optimal control in non-pregnant diabetic patients.  Different  metrics may apply to specific populations.   Standards of Medical Care in Diabetes - 2016.  For the purpose of screening for the presence of diabetes:  <5.7%     Consistent with the absence of diabetes  5.7-6.4%  Consistent with increasing risk for diabetes   (prediabetes)  >or=6.5%  Consistent with diabetes  Currently no consensus exists for use of hemoglobin A1C  for diagnosis of diabetes for children.     11/09/2016 6.9 (H) 4.5 - 6.2 % Final     Comment:     According to ADA guidelines, hemoglobin A1C <7.0% represents  optimal control in non-pregnant diabetic patients.  Different  metrics may apply to specific populations.   Standards of Medical Care in Diabetes - 2016.  For the purpose of screening for the presence of diabetes:  <5.7%     Consistent with the absence of diabetes  5.7-6.4%  Consistent with increasing risk for diabetes   (prediabetes)  >or=6.5%  Consistent with diabetes  Currently no consensus exists for use of hemoglobin A1C  for diagnosis of diabetes for children.             Patient Active Problem List   Diagnosis    Hyperlipidemia associated with type 2 diabetes mellitus    Essential hypertension    A-fib    PVD (peripheral vascular disease)    Special screening for malignant neoplasms, colon    S/P femoral-popliteal bypass surgery    S/P peripheral artery angioplasty    Type 2 diabetes mellitus with microalbuminuria    Type 2 diabetes mellitus with diabetic neuropathy    Osteomyelitis of toe of left foot       Medication List with Changes/Refills   Current Medications    ATENOLOL (TENORMIN) 100 MG TABLET    Take 1 tablet (100 mg total) by  mouth once daily.    BLOOD-GLUCOSE METER KIT    To check BG 2 times daily, to use with insurance preferred meter    CLOPIDOGREL (PLAVIX) 75 MG TABLET    Take 1 tablet by mouth  daily    DAPTOMYCIN (CUBICIN) INJECTION        DIPHENHYDRAMINE (BENADRYL) 50 MG/ML INJECTION        FENOFIBRATE MICRONIZED (LOFIBRA) 134 MG CAP    Take 1 capsule (134 mg total) by mouth once daily.    FLUCONAZOLE (DIFLUCAN) 100 MG TABLET    Take 100 mg by mouth once daily.    GABAPENTIN (NEURONTIN) 600 MG TABLET    TAKE 2 TABLETS BY MOUTH TWO TIMES DAILY    GLIPIZIDE-METFORMIN (METAGLIP) 2.5-500 MG PER TABLET    Take 2 tablets by mouth  twice a day before meals in the morning and evening    LANCETS MISC    To check BG 2 times daily, to use with insurance preferred meter    LISINOPRIL-HYDROCHLOROTHIAZIDE (PRINZIDE,ZESTORETIC) 20-12.5 MG PER TABLET    Take 2 tablets by mouth once daily.    PREDNISONE (DELTASONE) 20 MG TABLET        ROSUVASTATIN (CRESTOR) 40 MG TAB    Take 1 tablet (40 mg total) by mouth once daily.    SANTYL OINTMENT        WARFARIN (COUMADIN) 5 MG TABLET    TAKE 1 AND 1/2 TABLETS BY  MOUTH ON WEDNESDAY AND  SUNDAY AND 1 TABLET ALL  REMAINING DAY AS DIRECTED  BY THE COUMADIN CLINIC.   Discontinued Medications    HYDROCODONE-ACETAMINOPHEN 7.5-325MG (NORCO) 7.5-325 MG PER TABLET    Take 1 tablet by mouth every 6 (six) hours as needed for Pain.       Review of patient's allergies indicates:   Allergen Reactions    Sulfa (sulfonamide antibiotics)      Other reaction(s): Stomach upset    Sulfamethoxazole Rash    Trimethoprim Rash     Bactrim         Past Surgical History:   Procedure Laterality Date    CARDIAC ELECTROPHYSIOLOGY MAPPING AND ABLATION      POPLITEAL ARTERY STENT  2013    RECTOPERITONEAL FISTULA CLOSURE      TOE SURGERY      Joint release    TONSILLECTOMY         History reviewed. No pertinent family history.    Social History     Social History    Marital status:      Spouse name: N/A    Number of  "children: N/A    Years of education: N/A     Occupational History    Retired [employer]      [id number]     Social History Main Topics    Smoking status: Former Smoker     Packs/day: 4.00     Years: 30.00     Types: Cigarettes     Quit date: 1/9/1994    Smokeless tobacco: Never Used    Alcohol use No    Drug use: No    Sexual activity: Not on file     Other Topics Concern    Not on file     Social History Narrative    No narrative on file       Vitals:    12/29/17 1505   BP: 128/64   Pulse: 85   Weight: 112.4 kg (247 lb 12.8 oz)   Height: 5' 6" (1.676 m)   PainSc: 0-No pain       Hemoglobin A1C   Date Value Ref Range Status   11/15/2017 6.8 (H) 4.0 - 5.6 % Final     Comment:     According to ADA guidelines, hemoglobin A1c <7.0% represents  optimal control in non-pregnant diabetic patients. Different  metrics may apply to specific patient populations.   Standards of Medical Care in Diabetes-2016.  For the purpose of screening for the presence of diabetes:  <5.7%     Consistent with the absence of diabetes  5.7-6.4%  Consistent with increasing risk for diabetes   (prediabetes)  >or=6.5%  Consistent with diabetes  Currently, no consensus exists for use of hemoglobin A1c  for diagnosis of diabetes for children.  This Hemoglobin A1c assay has significant interference with fetal   hemoglobin   (HbF). The results are invalid for patients with abnormal amounts of   HbF,   including those with known Hereditary Persistence   of Fetal Hemoglobin. Heterozygous hemoglobin variants (HbAS, HbAC,   HbAD, HbAE, HbA2) do not significantly interfere with this assay;   however, presence of multiple variants in a sample may impact the %   interference.     05/16/2017 7.3 (H) 4.5 - 6.2 % Final     Comment:     According to ADA guidelines, hemoglobin A1C <7.0% represents  optimal control in non-pregnant diabetic patients.  Different  metrics may apply to specific populations.   Standards of Medical Care in Diabetes - 2016.  For the " purpose of screening for the presence of diabetes:  <5.7%     Consistent with the absence of diabetes  5.7-6.4%  Consistent with increasing risk for diabetes   (prediabetes)  >or=6.5%  Consistent with diabetes  Currently no consensus exists for use of hemoglobin A1C  for diagnosis of diabetes for children.     11/09/2016 6.9 (H) 4.5 - 6.2 % Final     Comment:     According to ADA guidelines, hemoglobin A1C <7.0% represents  optimal control in non-pregnant diabetic patients.  Different  metrics may apply to specific populations.   Standards of Medical Care in Diabetes - 2016.  For the purpose of screening for the presence of diabetes:  <5.7%     Consistent with the absence of diabetes  5.7-6.4%  Consistent with increasing risk for diabetes   (prediabetes)  >or=6.5%  Consistent with diabetes  Currently no consensus exists for use of hemoglobin A1C  for diagnosis of diabetes for children.         Review of Systems   Constitutional: Negative for chills and fever.   Respiratory: Negative for shortness of breath.    Cardiovascular: Negative for chest pain, palpitations, orthopnea, claudication and leg swelling.   Gastrointestinal: Negative for diarrhea, nausea and vomiting.   Musculoskeletal: Negative for joint pain.   Skin: Negative for rash.   Neurological: Positive for sensory change. Negative for dizziness, tingling, focal weakness and weakness.   Psychiatric/Behavioral: Negative.          Objective:      PHYSICAL EXAM: Apperance: Alert and orient in no distress,well developed, and with good attention to grooming and body habits  Patient presents ambulating in diabetic shoes and inserts.  Lower Extremity Exam  VASCULAR: Dorsalis pedis pulses 1/4 bilateral and Posterior Tibial pulses 1/4 bilateral. Capillary fill time <4 seconds bilateral. No edema observed bilateral. Varicosities present bilateral. Skin temperature of the lower extremities is warm to warm, proximal to distal. Hair growth absent bilateral. (--)  lymphangitis or (--) cellulitis noted bilateral.  DERMATOLOGICAL: (--) edema, (--) erythema, (--) malodor, (+) blood drainage, (--) warmth to left foot. Peeling dry blood blister noted to dorsal medial hallux. The dorsum surface of the feet are soft and supple.  The plantar aspects of both feet are dry and scaly. Webspaces 1,2,3,4 clean, dry and without evidence of break in skin integrity bilateral.   NEUROLOGICAL: Light touch, sharp-dull, proprioception all present and equal bilaterally.    MUSCULOSKELETAL: Muscle strength is 5/5 for foot inverters, everters, plantarflexors, and dorsiflexors. Muscle tone is normal.  Inspection/palpation of bone, joints and muscles unremarkable with the exception of that noted above. 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            Assessment:       Encounter Diagnoses   Name Primary?    Blister of toe of left foot without infection, subsequent encounter Yes    Type II diabetes mellitus with peripheral circulatory disorder          Plan:   Blister of toe of left foot without infection, subsequent encounter    Type II diabetes mellitus with peripheral circulatory disorder      I counseled the patient on his conditions, regarding findings of my examination, my impressions, and usual treatment plan.   Shoe inspection. Diabetic Foot Education. Patient reminded of the importance of good nutrition and blood sugar control to help prevent podiatric complications of diabetes. Patient instructed on proper foot hygeine. We discussed wearing proper shoe gear, daily foot inspections, never walking without protective shoe gear, never putting sharp instruments to feet.    Patient  will continue to monitor the areas daily, inspect feet, wear protective shoe gear when ambulatory, moisturizer to maintain skin integrity. Patient reminded of the importance of good nutrition and blood sugar control to help prevent podiatric complications of diabetes.  Patient to return 1 months or sooner if needed.                  Nic Castillo, DPM  [facility]

## 2018-01-22 ENCOUNTER — PATIENT MESSAGE (OUTPATIENT)
Dept: PODIATRY | Facility: CLINIC | Age: 78
End: 2018-01-22

## 2018-01-26 ENCOUNTER — OFFICE VISIT (OUTPATIENT)
Dept: PODIATRY | Facility: CLINIC | Age: 78
End: 2018-01-26
Payer: MEDICARE

## 2018-01-26 VITALS
BODY MASS INDEX: 40.36 KG/M2 | SYSTOLIC BLOOD PRESSURE: 121 MMHG | HEIGHT: 66 IN | HEART RATE: 85 BPM | WEIGHT: 251.13 LBS | DIASTOLIC BLOOD PRESSURE: 58 MMHG

## 2018-01-26 DIAGNOSIS — S91.209A AVULSION OF TOENAIL, INITIAL ENCOUNTER: Primary | ICD-10-CM

## 2018-01-26 DIAGNOSIS — I73.9 PVD (PERIPHERAL VASCULAR DISEASE): ICD-10-CM

## 2018-01-26 PROCEDURE — 3008F BODY MASS INDEX DOCD: CPT | Mod: S$GLB,,, | Performed by: PODIATRIST

## 2018-01-26 PROCEDURE — 1159F MED LIST DOCD IN RCRD: CPT | Mod: S$GLB,,, | Performed by: PODIATRIST

## 2018-01-26 PROCEDURE — 99999 PR PBB SHADOW E&M-EST. PATIENT-LVL III: CPT | Mod: PBBFAC,,, | Performed by: PODIATRIST

## 2018-01-26 PROCEDURE — 99213 OFFICE O/P EST LOW 20 MIN: CPT | Mod: S$GLB,,, | Performed by: PODIATRIST

## 2018-01-26 PROCEDURE — 1126F AMNT PAIN NOTED NONE PRSNT: CPT | Mod: S$GLB,,, | Performed by: PODIATRIST

## 2018-01-31 ENCOUNTER — OFFICE VISIT (OUTPATIENT)
Dept: PODIATRY | Facility: CLINIC | Age: 78
End: 2018-01-31
Payer: MEDICARE

## 2018-01-31 ENCOUNTER — PATIENT MESSAGE (OUTPATIENT)
Dept: PODIATRY | Facility: CLINIC | Age: 78
End: 2018-01-31

## 2018-01-31 VITALS — DIASTOLIC BLOOD PRESSURE: 78 MMHG | SYSTOLIC BLOOD PRESSURE: 137 MMHG | HEART RATE: 109 BPM | HEIGHT: 66 IN

## 2018-01-31 DIAGNOSIS — S91.209D AVULSION OF TOENAIL, SUBSEQUENT ENCOUNTER: ICD-10-CM

## 2018-01-31 DIAGNOSIS — I73.9 PVD (PERIPHERAL VASCULAR DISEASE): Primary | ICD-10-CM

## 2018-01-31 PROCEDURE — 1126F AMNT PAIN NOTED NONE PRSNT: CPT | Mod: S$GLB,,, | Performed by: PODIATRIST

## 2018-01-31 PROCEDURE — 99999 PR PBB SHADOW E&M-EST. PATIENT-LVL II: CPT | Mod: PBBFAC,,, | Performed by: PODIATRIST

## 2018-01-31 PROCEDURE — 3008F BODY MASS INDEX DOCD: CPT | Mod: S$GLB,,, | Performed by: PODIATRIST

## 2018-01-31 PROCEDURE — 99212 OFFICE O/P EST SF 10 MIN: CPT | Mod: S$GLB,,, | Performed by: PODIATRIST

## 2018-01-31 PROCEDURE — 1159F MED LIST DOCD IN RCRD: CPT | Mod: S$GLB,,, | Performed by: PODIATRIST

## 2018-02-05 NOTE — PROGRESS NOTES
Subjective:     Patient ID: Raymond Stuart is a 77 y.o. male.    Chief Complaint: Feet Check (PCP: DELIA Cristobal 11/30/2017, diabetic patient, patient is accomopanied by his daughter )    Raymond is a 77 y.o. male who presents to the clinic for evaluation and treatment of high risk feet. Raymond has a past medical history of Diabetes mellitus, type 2; Hyperlipidemia; Hypertension; Irregular heartbeat; PVD (peripheral vascular disease); S/P femoral-popliteal bypass surgery (8/8/2014); and S/P peripheral artery angioplasty (8/8/2014). The patient's chief complaint is blister/loose nail on his left big toe. Patient states he applied the betadine as instructed and the skin peeled off some. Patient is accompnaied by his duahter today. Patients daughter states he was seen by Dr. Judge last week and is to be scheduled for surgery due to blockage some time next week. Patient denies any trauma or drainage from the toe.  This patient has documented high risk feet requiring routine maintenance secondary to peripheral vascular disease.    PCP: JOHNATHON Cristobal Jr, MD    Date Last Seen by PCP: 11/30/17    Current shoe gear:  Affected Foot: Rx diabetic extra depth shoes and custom accommodative insoles     Unaffected Foot: Rx diabetic extra depth shoes and custom accommodative insoles    History of Trauma: negative  Sign of Infection: none    Hemoglobin A1C   Date Value Ref Range Status   11/15/2017 6.8 (H) 4.0 - 5.6 % Final     Comment:     According to ADA guidelines, hemoglobin A1c <7.0% represents  optimal control in non-pregnant diabetic patients. Different  metrics may apply to specific patient populations.   Standards of Medical Care in Diabetes-2016.  For the purpose of screening for the presence of diabetes:  <5.7%     Consistent with the absence of diabetes  5.7-6.4%  Consistent with increasing risk for diabetes   (prediabetes)  >or=6.5%  Consistent with diabetes  Currently, no consensus exists for use of hemoglobin  A1c  for diagnosis of diabetes for children.  This Hemoglobin A1c assay has significant interference with fetal   hemoglobin   (HbF). The results are invalid for patients with abnormal amounts of   HbF,   including those with known Hereditary Persistence   of Fetal Hemoglobin. Heterozygous hemoglobin variants (HbAS, HbAC,   HbAD, HbAE, HbA2) do not significantly interfere with this assay;   however, presence of multiple variants in a sample may impact the %   interference.     05/16/2017 7.3 (H) 4.5 - 6.2 % Final     Comment:     According to ADA guidelines, hemoglobin A1C <7.0% represents  optimal control in non-pregnant diabetic patients.  Different  metrics may apply to specific populations.   Standards of Medical Care in Diabetes - 2016.  For the purpose of screening for the presence of diabetes:  <5.7%     Consistent with the absence of diabetes  5.7-6.4%  Consistent with increasing risk for diabetes   (prediabetes)  >or=6.5%  Consistent with diabetes  Currently no consensus exists for use of hemoglobin A1C  for diagnosis of diabetes for children.     11/09/2016 6.9 (H) 4.5 - 6.2 % Final     Comment:     According to ADA guidelines, hemoglobin A1C <7.0% represents  optimal control in non-pregnant diabetic patients.  Different  metrics may apply to specific populations.   Standards of Medical Care in Diabetes - 2016.  For the purpose of screening for the presence of diabetes:  <5.7%     Consistent with the absence of diabetes  5.7-6.4%  Consistent with increasing risk for diabetes   (prediabetes)  >or=6.5%  Consistent with diabetes  Currently no consensus exists for use of hemoglobin A1C  for diagnosis of diabetes for children.             Patient Active Problem List   Diagnosis    Hyperlipidemia associated with type 2 diabetes mellitus    Essential hypertension    A-fib    PVD (peripheral vascular disease)    Special screening for malignant neoplasms, colon    S/P femoral-popliteal bypass surgery    S/P  peripheral artery angioplasty    Type 2 diabetes mellitus with microalbuminuria    Type 2 diabetes mellitus with diabetic neuropathy    Osteomyelitis of toe of left foot       Medication List with Changes/Refills   Current Medications    ATENOLOL (TENORMIN) 100 MG TABLET    Take 1 tablet (100 mg total) by mouth once daily.    BLOOD-GLUCOSE METER KIT    To check BG 2 times daily, to use with insurance preferred meter    CLOPIDOGREL (PLAVIX) 75 MG TABLET    Take 1 tablet by mouth  daily    DAPTOMYCIN (CUBICIN) INJECTION        DIPHENHYDRAMINE (BENADRYL) 50 MG/ML INJECTION        FENOFIBRATE MICRONIZED (LOFIBRA) 134 MG CAP    Take 1 capsule (134 mg total) by mouth once daily.    FLUCONAZOLE (DIFLUCAN) 100 MG TABLET    Take 100 mg by mouth once daily.    GABAPENTIN (NEURONTIN) 600 MG TABLET    TAKE 2 TABLETS BY MOUTH TWO TIMES DAILY    GLIPIZIDE-METFORMIN (METAGLIP) 2.5-500 MG PER TABLET    Take 2 tablets by mouth  twice a day before meals in the morning and evening    LANCETS MISC    To check BG 2 times daily, to use with insurance preferred meter    LISINOPRIL-HYDROCHLOROTHIAZIDE (PRINZIDE,ZESTORETIC) 20-12.5 MG PER TABLET    Take 2 tablets by mouth once daily.    PREDNISONE (DELTASONE) 20 MG TABLET        ROSUVASTATIN (CRESTOR) 40 MG TAB    Take 1 tablet (40 mg total) by mouth once daily.    SANTYL OINTMENT        WARFARIN (COUMADIN) 5 MG TABLET    TAKE 1 AND 1/2 TABLETS BY  MOUTH ON WEDNESDAY AND  SUNDAY AND 1 TABLET ALL  REMAINING DAY AS DIRECTED  BY THE [facility] CLINIC.       Review of patient's allergies indicates:   Allergen Reactions    Sulfa (sulfonamide antibiotics)      Other reaction(s): Stomach upset    Sulfamethoxazole Rash    Trimethoprim Rash     Bactrim         Past Surgical History:   Procedure Laterality Date    CARDIAC ELECTROPHYSIOLOGY MAPPING AND ABLATION      POPLITEAL ARTERY STENT  2013    RECTOPERITONEAL FISTULA CLOSURE      TOE SURGERY      Joint release    TONSILLECTOMY    "      History reviewed. No pertinent family history.    Social History     Social History    Marital status:      Spouse name: N/A    Number of children: N/A    Years of education: N/A     Occupational History    Retired [employer]     Social History Main Topics    Smoking status: Former Smoker     Packs/day: 4.00     Years: 30.00     Types: Cigarettes     Quit date: 1/9/1994    Smokeless tobacco: Never Used    Alcohol use No    Drug use: No    Sexual activity: Not on file     Other Topics Concern    Not on file     Social History Narrative    No narrative on file       Vitals:    01/26/18 1515   BP: (!) 121/58   Pulse: 85   Weight: 113.9 kg (251 lb 1.7 oz)   Height: 5' 6" (1.676 m)   PainSc: 0-No pain       Hemoglobin A1C   Date Value Ref Range Status   11/15/2017 6.8 (H) 4.0 - 5.6 % Final     Comment:     According to ADA guidelines, hemoglobin A1c <7.0% represents  optimal control in non-pregnant diabetic patients. Different  metrics may apply to specific patient populations.   Standards of Medical Care in Diabetes-2016.  For the purpose of screening for the presence of diabetes:  <5.7%     Consistent with the absence of diabetes  5.7-6.4%  Consistent with increasing risk for diabetes   (prediabetes)  >or=6.5%  Consistent with diabetes  Currently, no consensus exists for use of hemoglobin A1c  for diagnosis of diabetes for children.  This Hemoglobin A1c assay has significant interference with fetal   hemoglobin   (HbF). The results are invalid for patients with abnormal amounts of   HbF,   including those with known Hereditary Persistence   of Fetal Hemoglobin. Heterozygous hemoglobin variants (HbAS, HbAC,   HbAD, HbAE, HbA2) do not significantly interfere with this assay;   however, presence of multiple variants in a sample may impact the %   interference.     05/16/2017 7.3 (H) 4.5 - 6.2 % Final     Comment:     According to ADA guidelines, hemoglobin A1C <7.0% represents  optimal " control in non-pregnant diabetic patients.  Different  metrics may apply to specific populations.   Standards of Medical Care in Diabetes - 2016.  For the purpose of screening for the presence of diabetes:  <5.7%     Consistent with the absence of diabetes  5.7-6.4%  Consistent with increasing risk for diabetes   (prediabetes)  >or=6.5%  Consistent with diabetes  Currently no consensus exists for use of hemoglobin A1C  for diagnosis of diabetes for children.     11/09/2016 6.9 (H) 4.5 - 6.2 % Final     Comment:     According to ADA guidelines, hemoglobin A1C <7.0% represents  optimal control in non-pregnant diabetic patients.  Different  metrics may apply to specific populations.   Standards of Medical Care in Diabetes - 2016.  For the purpose of screening for the presence of diabetes:  <5.7%     Consistent with the absence of diabetes  5.7-6.4%  Consistent with increasing risk for diabetes   (prediabetes)  >or=6.5%  Consistent with diabetes  Currently no consensus exists for use of hemoglobin A1C  for diagnosis of diabetes for children.         Review of Systems   Constitutional: Negative for chills and fever.   Respiratory: Negative for shortness of breath.    Cardiovascular: Negative for chest pain, palpitations, orthopnea, claudication and leg swelling.   Gastrointestinal: Negative for diarrhea, nausea and vomiting.   Musculoskeletal: Negative for joint pain.   Skin: Negative for rash.   Neurological: Positive for sensory change. Negative for dizziness, tingling, focal weakness and weakness.   Psychiatric/Behavioral: Negative.          Objective:      PHYSICAL EXAM: Apperance: Alert and orient in no distress,well developed, and with good attention to grooming and body habits  Patient presents ambulating in diabetic shoes and inserts.  Lower Extremity Exam  VASCULAR: Dorsalis pedis pulses 1/4 bilateral and Posterior Tibial pulses 1/4 bilateral. Capillary fill time <4 seconds bilateral. No edema observed  bilateral. Varicosities present bilateral. Skin temperature of the lower extremities is warm to warm, proximal to distal. Hair growth absent bilateral. (--) lymphangitis or (--) cellulitis noted bilateral.  DERMATOLOGICAL: (--) edema, (--) erythema, (--) malodor, (+) blood drainage, (--) warmth to left foot. Partiall avulsed left hallux nail. The dorsum surface of the feet are soft and supple.  The plantar aspects of both feet are dry and scaly. Webspaces 1,2,3,4 clean, dry and without evidence of break in skin integrity bilateral.   NEUROLOGICAL: Light touch, sharp-dull, proprioception all present and equal bilaterally.    MUSCULOSKELETAL: Muscle strength is 5/5 for foot inverters, everters, plantarflexors, and dorsiflexors. Muscle tone is normal.  Inspection/palpation of bone, joints and muscles unremarkable with the exception of that noted above. 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            Assessment:       Encounter Diagnoses   Name Primary?    Avulsion of toenail, initial encounter Yes    PVD (peripheral vascular disease)          Plan:   Avulsion of toenail, initial encounter    PVD (peripheral vascular disease)      I counseled the patient on his conditions, regarding findings of my examination, my impressions, and usual treatment plan.   Shoe inspection. Diabetic Foot Education. Patient reminded of the importance of good nutrition and blood sugar control to help prevent podiatric complications of diabetes. Patient instructed on proper foot hygeine. We discussed wearing proper shoe gear, daily foot inspections, never walking without protective shoe gear, never putting sharp instruments to feet.    Utilizing sterile toenail clippers I aggressively trimmed  the offending nail border approximately 3 mm from its edge and carried the nail plate incision down at an angle in order to wedge out the offending cryptotic portion of the nail plate. The offending border was then removed in toto. The remaining nail was grinded  down with an electric  down to nail bed.  No blood was drawn. Patient tolerated the procedure well and related significant relief.  Patient  will continue to monitor the areas daily, inspect feet, wear protective shoe gear when ambulatory, moisturizer to maintain skin integrity. Patient reminded of the importance of good nutrition and blood sugar control to help prevent podiatric complications of diabetes.  Patient to return 1 week or sooner if needed.                 Nic Castillo DPM  [facility]

## 2018-02-23 ENCOUNTER — PATIENT MESSAGE (OUTPATIENT)
Dept: PODIATRY | Facility: CLINIC | Age: 78
End: 2018-02-23

## 2018-03-08 RX ORDER — GABAPENTIN 600 MG/1
TABLET ORAL
Qty: 360 TABLET | Refills: 3 | Status: SHIPPED | OUTPATIENT
Start: 2018-03-08 | End: 2018-11-27 | Stop reason: SDUPTHER

## 2018-03-23 ENCOUNTER — OFFICE VISIT (OUTPATIENT)
Dept: PODIATRY | Facility: CLINIC | Age: 78
End: 2018-03-23
Payer: MEDICARE

## 2018-03-23 VITALS
SYSTOLIC BLOOD PRESSURE: 133 MMHG | WEIGHT: 251.56 LBS | BODY MASS INDEX: 40.43 KG/M2 | DIASTOLIC BLOOD PRESSURE: 68 MMHG | HEART RATE: 73 BPM | HEIGHT: 66 IN

## 2018-03-23 DIAGNOSIS — I73.9 PVD (PERIPHERAL VASCULAR DISEASE): Primary | ICD-10-CM

## 2018-03-23 DIAGNOSIS — E11.51 TYPE II DIABETES MELLITUS WITH PERIPHERAL CIRCULATORY DISORDER: ICD-10-CM

## 2018-03-23 DIAGNOSIS — B35.1 DERMATOPHYTOSIS OF NAIL: ICD-10-CM

## 2018-03-23 DIAGNOSIS — L84 PRE-ULCERATIVE CALLUSES: ICD-10-CM

## 2018-03-23 PROCEDURE — 99999 PR PBB SHADOW E&M-EST. PATIENT-LVL III: CPT | Mod: PBBFAC,,, | Performed by: PODIATRIST

## 2018-03-23 PROCEDURE — 99499 UNLISTED E&M SERVICE: CPT | Mod: S$GLB,,, | Performed by: PODIATRIST

## 2018-03-23 PROCEDURE — 11721 DEBRIDE NAIL 6 OR MORE: CPT | Mod: Q7,S$GLB,, | Performed by: PODIATRIST

## 2018-03-29 NOTE — PROGRESS NOTES
Subjective:     Patient ID: Raymond Stuart is a 78 y.o. male.    Chief Complaint: Routine Foot Care (Last visit with PCP Dr. Cristobal 11/2017)    Raymond is a 78 y.o. male who presents to the clinic for evaluation and treatment of high risk feet. Raymond has a past medical history of Diabetes mellitus, type 2; Hyperlipidemia; Hypertension; Irregular heartbeat; PVD (peripheral vascular disease); S/P femoral-popliteal bypass surgery (8/8/2014); and S/P peripheral artery angioplasty (8/8/2014). The patient's chief complaint is routine nail care. Patient is accompnaied by his duahter today. Patients daughter states he had procedure with Dr. Judge 2 weeks ago. Patient denies any trauma or drainage from the toe.  This patient has documented high risk feet requiring routine maintenance secondary to peripheral vascular disease.    PCP: JOHNATHON Cristobal Jr, MD    Date Last Seen by PCP: 11/30/17    Current shoe gear:  Affected Foot: Rx diabetic extra depth shoes and custom accommodative insoles     Unaffected Foot: Rx diabetic extra depth shoes and custom accommodative insoles    History of Trauma: negative  Sign of Infection: none    Hemoglobin A1C   Date Value Ref Range Status   11/15/2017 6.8 (H) 4.0 - 5.6 % Final     Comment:     According to ADA guidelines, hemoglobin A1c <7.0% represents  optimal control in non-pregnant diabetic patients. Different  metrics may apply to specific patient populations.   Standards of Medical Care in Diabetes-2016.  For the purpose of screening for the presence of diabetes:  <5.7%     Consistent with the absence of diabetes  5.7-6.4%  Consistent with increasing risk for diabetes   (prediabetes)  >or=6.5%  Consistent with diabetes  Currently, no consensus exists for use of hemoglobin A1c  for diagnosis of diabetes for children.  This Hemoglobin A1c assay has significant interference with fetal   hemoglobin   (HbF). The results are invalid for patients with abnormal amounts of   HbF,   including  those with known Hereditary Persistence   of Fetal Hemoglobin. Heterozygous hemoglobin variants (HbAS, HbAC,   HbAD, HbAE, HbA2) do not significantly interfere with this assay;   however, presence of multiple variants in a sample may impact the %   interference.     05/16/2017 7.3 (H) 4.5 - 6.2 % Final     Comment:     According to ADA guidelines, hemoglobin A1C <7.0% represents  optimal control in non-pregnant diabetic patients.  Different  metrics may apply to specific populations.   Standards of Medical Care in Diabetes - 2016.  For the purpose of screening for the presence of diabetes:  <5.7%     Consistent with the absence of diabetes  5.7-6.4%  Consistent with increasing risk for diabetes   (prediabetes)  >or=6.5%  Consistent with diabetes  Currently no consensus exists for use of hemoglobin A1C  for diagnosis of diabetes for children.     11/09/2016 6.9 (H) 4.5 - 6.2 % Final     Comment:     According to ADA guidelines, hemoglobin A1C <7.0% represents  optimal control in non-pregnant diabetic patients.  Different  metrics may apply to specific populations.   Standards of Medical Care in Diabetes - 2016.  For the purpose of screening for the presence of diabetes:  <5.7%     Consistent with the absence of diabetes  5.7-6.4%  Consistent with increasing risk for diabetes   (prediabetes)  >or=6.5%  Consistent with diabetes  Currently no consensus exists for use of hemoglobin A1C  for diagnosis of diabetes for children.             Patient Active Problem List   Diagnosis    Hyperlipidemia associated with type 2 diabetes mellitus    Essential hypertension    A-fib    PVD (peripheral vascular disease)    Special screening for malignant neoplasms, colon    S/P femoral-popliteal bypass surgery    S/P peripheral artery angioplasty    Type 2 diabetes mellitus with microalbuminuria    Type 2 diabetes mellitus with diabetic neuropathy    Osteomyelitis of toe of left foot       Medication List with Changes/Refills    Current Medications    ATENOLOL (TENORMIN) 100 MG TABLET    Take 1 tablet (100 mg total) by mouth once daily.    BLOOD-GLUCOSE METER KIT    To check BG 2 times daily, to use with insurance preferred meter    CLOPIDOGREL (PLAVIX) 75 MG TABLET    Take 1 tablet by mouth  daily    DAPTOMYCIN (CUBICIN) INJECTION        DIPHENHYDRAMINE (BENADRYL) 50 MG/ML INJECTION        FENOFIBRATE MICRONIZED (LOFIBRA) 134 MG CAP    Take 1 capsule (134 mg total) by mouth once daily.    FLUCONAZOLE (DIFLUCAN) 100 MG TABLET    Take 100 mg by mouth once daily.    GABAPENTIN (NEURONTIN) 600 MG TABLET    TAKE 2 TABLETS BY MOUTH TWO TIMES DAILY    GLIPIZIDE-METFORMIN (METAGLIP) 2.5-500 MG PER TABLET    Take 2 tablets by mouth  twice a day before meals in the morning and evening    LANCETS MISC    To check BG 2 times daily, to use with insurance preferred meter    LISINOPRIL-HYDROCHLOROTHIAZIDE (PRINZIDE,ZESTORETIC) 20-12.5 MG PER TABLET    Take 2 tablets by mouth once daily.    PREDNISONE (DELTASONE) 20 MG TABLET        ROSUVASTATIN (CRESTOR) 40 MG TAB    Take 1 tablet (40 mg total) by mouth once daily.    SANTYL OINTMENT        WARFARIN (COUMADIN) 5 MG TABLET    TAKE 1 AND 1/2 TABLETS BY  MOUTH ON WEDNESDAY AND  SUNDAY AND 1 TABLET ALL  REMAINING DAY AS DIRECTED  BY THE COUMADIN CLINIC.       Review of patient's allergies indicates:   Allergen Reactions    Sulfa (sulfonamide antibiotics)      Other reaction(s): Stomach upset    Sulfamethoxazole Rash    Trimethoprim Rash     Bactrim         Past Surgical History:   Procedure Laterality Date    CARDIAC ELECTROPHYSIOLOGY MAPPING AND ABLATION      POPLITEAL ARTERY STENT  2013    RECTOPERITONEAL FISTULA CLOSURE      TOE SURGERY      Joint release    TONSILLECTOMY         History reviewed. No pertinent family history.    Social History     Social History    Marital status:      Spouse name: N/A    Number of children: N/A    Years of education: N/A     Occupational History     "Retired       [employer]     Social History Main Topics    Smoking status: Former Smoker     Packs/day: 4.00     Years: 30.00     Types: Cigarettes     Quit date: 1/9/1994    Smokeless tobacco: Never Used    Alcohol use No    Drug use: No    Sexual activity: Not on file     Other Topics Concern    Not on file     Social History Narrative    No narrative on file       Vitals:    03/23/18 1520   BP: 133/68   Pulse: 73   Weight: 114.1 kg (251 lb 8.7 oz)   Height: 5' 6" (1.676 m)   PainSc: 0-No pain       Hemoglobin A1C   Date Value Ref Range Status   11/15/2017 6.8 (H) 4.0 - 5.6 % Final     Comment:     According to ADA guidelines, hemoglobin A1c <7.0% represents  optimal control in non-pregnant diabetic patients. Different  metrics may apply to specific patient populations.   Standards of Medical Care in Diabetes-2016.  For the purpose of screening for the presence of diabetes:  <5.7%     Consistent with the absence of diabetes  5.7-6.4%  Consistent with increasing risk for diabetes   (prediabetes)  >or=6.5%  Consistent with diabetes  Currently, no consensus exists for use of hemoglobin A1c  for diagnosis of diabetes for children.  This Hemoglobin A1c assay has significant interference with fetal   hemoglobin   (HbF). The results are invalid for patients with abnormal amounts of   HbF,   including those with known Hereditary Persistence   of Fetal Hemoglobin. Heterozygous hemoglobin variants (HbAS, HbAC,   HbAD, HbAE, HbA2) do not significantly interfere with this assay;   however, presence of multiple variants in a sample may impact the %   interference.     05/16/2017 7.3 (H) 4.5 - 6.2 % Final     Comment:     According to ADA guidelines, hemoglobin A1C <7.0% represents  optimal control in non-pregnant diabetic patients.  Different  metrics may apply to specific populations.   Standards of Medical Care in Diabetes - 2016.  For the purpose of screening for the presence of diabetes:  <5.7%     Consistent " with the absence of diabetes  5.7-6.4%  Consistent with increasing risk for diabetes   (prediabetes)  >or=6.5%  Consistent with diabetes  Currently no consensus exists for use of hemoglobin A1C  for diagnosis of diabetes for children.     11/09/2016 6.9 (H) 4.5 - 6.2 % Final     Comment:     According to ADA guidelines, hemoglobin A1C <7.0% represents  optimal control in non-pregnant diabetic patients.  Different  metrics may apply to specific populations.   Standards of Medical Care in Diabetes - 2016.  For the purpose of screening for the presence of diabetes:  <5.7%     Consistent with the absence of diabetes  5.7-6.4%  Consistent with increasing risk for diabetes   (prediabetes)  >or=6.5%  Consistent with diabetes  Currently no consensus exists for use of hemoglobin A1C  for diagnosis of diabetes for children.         Review of Systems   Constitutional: Negative for chills and fever.   Respiratory: Negative for shortness of breath.    Cardiovascular: Negative for chest pain, palpitations, orthopnea, claudication and leg swelling.   Gastrointestinal: Negative for diarrhea, nausea and vomiting.   Musculoskeletal: Negative for joint pain.   Skin: Negative for rash.   Neurological: Positive for sensory change. Negative for dizziness, tingling, focal weakness and weakness.   Psychiatric/Behavioral: Negative.          Objective:      PHYSICAL EXAM: Apperance: Alert and orient in no distress,well developed, and with good attention to grooming and body habits  Patient presents ambulating in diabetic shoes and inserts.  Lower Extremity Exam  VASCULAR: Dorsalis pedis pulses 1/4 bilateral and Posterior Tibial pulses 1/4 bilateral. Capillary fill time <4 seconds bilateral. No edema observed bilateral. Varicosities present bilateral. Skin temperature of the lower extremities is warm to warm, proximal to distal. Hair growth absent bilateral. (--) lymphangitis or (--) cellulitis noted bilateral.  DERMATOLOGICAL: (--) edema,  (--) erythema, (--) malodor, (-) drainage, (--) warmth to left foot. Left hallux nail absent. Nails 2,3,4 left and 2,3,4,5 right elongated, thickened, and discolored with subungual debris. The dorsum surface of the feet are soft and supple.  The plantar aspects of both feet are dry and scaly. Webspaces 1,2,3,4 clean, dry and without evidence of break in skin integrity bilateral.   NEUROLOGICAL: Light touch, sharp-dull, proprioception all present and equal bilaterally.    MUSCULOSKELETAL: Muscle strength is 5/5 for foot inverters, everters, plantarflexors, and dorsiflexors. Muscle tone is normal.  Inspection/palpation of bone, joints and muscles unremarkable with the exception of that noted above. 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            Assessment:       Encounter Diagnoses   Name Primary?    PVD (peripheral vascular disease) Yes    Type II diabetes mellitus with peripheral circulatory disorder     Pre-ulcerative calluses - Left Foot     Dermatophytosis of nail          Plan:   PVD (peripheral vascular disease)  -     DIABETIC SHOES FOR HOME USE    Type II diabetes mellitus with peripheral circulatory disorder  -     DIABETIC SHOES FOR HOME USE    Pre-ulcerative calluses - Left Foot  -     DIABETIC SHOES FOR HOME USE    Dermatophytosis of nail      I counseled the patient on his conditions, regarding findings of my examination, my impressions, and usual treatment plan.   Shoe inspection. Diabetic Foot Education. Patient reminded of the importance of good nutrition and blood sugar control to help prevent podiatric complications of diabetes. Patient instructed on proper foot hygeine. We discussed wearing proper shoe gear, daily foot inspections, never walking without protective shoe gear, never putting sharp instruments to feet.    With patient's permission, nails as noted above bilateral were debrided/trimmed in length and thickness aggressively to their soft tissue attachment mechanically and with electric ,  removing all offending nail and debris. Patient relates relief following the procedure.  Patient  will continue to monitor the areas daily, inspect feet, wear protective shoe gear when ambulatory, moisturizer to maintain skin integrity. Patient reminded of the importance of good nutrition and blood sugar control to help prevent podiatric complications of diabetes.  Prescription written for Diabetic shoes and inserts.   Patient to return 2 months or sooner if needed.                   Nci Castillo DPM  [facility]

## 2018-04-18 ENCOUNTER — PATIENT MESSAGE (OUTPATIENT)
Dept: INTERNAL MEDICINE | Facility: CLINIC | Age: 78
End: 2018-04-18

## 2018-05-23 ENCOUNTER — LAB VISIT (OUTPATIENT)
Dept: LAB | Facility: HOSPITAL | Age: 78
End: 2018-05-23
Attending: PEDIATRICS
Payer: MEDICARE

## 2018-05-23 DIAGNOSIS — R80.9 TYPE 2 DIABETES MELLITUS WITH MICROALBUMINURIA, WITHOUT LONG-TERM CURRENT USE OF INSULIN: ICD-10-CM

## 2018-05-23 DIAGNOSIS — N42.9 PROSTATE DISEASE: ICD-10-CM

## 2018-05-23 DIAGNOSIS — E11.29 TYPE 2 DIABETES MELLITUS WITH MICROALBUMINURIA, WITHOUT LONG-TERM CURRENT USE OF INSULIN: ICD-10-CM

## 2018-05-23 LAB
ALT SERPL W/O P-5'-P-CCNC: 17 U/L
ANION GAP SERPL CALC-SCNC: 9 MMOL/L
AST SERPL-CCNC: 20 U/L
BUN SERPL-MCNC: 26 MG/DL
CALCIUM SERPL-MCNC: 10.4 MG/DL
CHLORIDE SERPL-SCNC: 100 MMOL/L
CHOLEST SERPL-MCNC: 94 MG/DL
CHOLEST/HDLC SERPL: 4.5 {RATIO}
CO2 SERPL-SCNC: 29 MMOL/L
COMPLEXED PSA SERPL-MCNC: 1.1 NG/ML
CREAT SERPL-MCNC: 1.5 MG/DL
EST. GFR  (AFRICAN AMERICAN): 51.5 ML/MIN/1.73 M^2
EST. GFR  (NON AFRICAN AMERICAN): 44.6 ML/MIN/1.73 M^2
ESTIMATED AVG GLUCOSE: 157 MG/DL
GLUCOSE SERPL-MCNC: 101 MG/DL
HBA1C MFR BLD HPLC: 7.1 %
HDLC SERPL-MCNC: 21 MG/DL
HDLC SERPL: 22.3 %
LDLC SERPL CALC-MCNC: 48.4 MG/DL
NONHDLC SERPL-MCNC: 73 MG/DL
POTASSIUM SERPL-SCNC: 4.6 MMOL/L
SODIUM SERPL-SCNC: 138 MMOL/L
TRIGL SERPL-MCNC: 123 MG/DL

## 2018-05-23 PROCEDURE — 84153 ASSAY OF PSA TOTAL: CPT

## 2018-05-23 PROCEDURE — 36415 COLL VENOUS BLD VENIPUNCTURE: CPT | Mod: PO

## 2018-05-23 PROCEDURE — 83036 HEMOGLOBIN GLYCOSYLATED A1C: CPT

## 2018-05-23 PROCEDURE — 80048 BASIC METABOLIC PNL TOTAL CA: CPT

## 2018-05-23 PROCEDURE — 84460 ALANINE AMINO (ALT) (SGPT): CPT

## 2018-05-23 PROCEDURE — 80061 LIPID PANEL: CPT

## 2018-05-23 PROCEDURE — 84450 TRANSFERASE (AST) (SGOT): CPT

## 2018-05-25 ENCOUNTER — OFFICE VISIT (OUTPATIENT)
Dept: PODIATRY | Facility: CLINIC | Age: 78
End: 2018-05-25
Payer: MEDICARE

## 2018-05-25 ENCOUNTER — PATIENT MESSAGE (OUTPATIENT)
Dept: PODIATRY | Facility: CLINIC | Age: 78
End: 2018-05-25

## 2018-05-25 VITALS
SYSTOLIC BLOOD PRESSURE: 144 MMHG | HEIGHT: 66 IN | DIASTOLIC BLOOD PRESSURE: 77 MMHG | BODY MASS INDEX: 40.43 KG/M2 | RESPIRATION RATE: 20 BRPM | WEIGHT: 251.56 LBS | HEART RATE: 86 BPM

## 2018-05-25 DIAGNOSIS — B35.1 DERMATOPHYTOSIS OF NAIL: Primary | ICD-10-CM

## 2018-05-25 DIAGNOSIS — I73.9 PVD (PERIPHERAL VASCULAR DISEASE): ICD-10-CM

## 2018-05-25 DIAGNOSIS — E11.51 TYPE II DIABETES MELLITUS WITH PERIPHERAL CIRCULATORY DISORDER: ICD-10-CM

## 2018-05-25 PROCEDURE — 11721 DEBRIDE NAIL 6 OR MORE: CPT | Mod: Q7,S$GLB,, | Performed by: PODIATRIST

## 2018-05-25 PROCEDURE — 99499 UNLISTED E&M SERVICE: CPT | Mod: S$GLB,,, | Performed by: PODIATRIST

## 2018-05-25 PROCEDURE — 99999 PR PBB SHADOW E&M-EST. PATIENT-LVL III: CPT | Mod: PBBFAC,,, | Performed by: PODIATRIST

## 2018-05-30 ENCOUNTER — OFFICE VISIT (OUTPATIENT)
Dept: INTERNAL MEDICINE | Facility: CLINIC | Age: 78
End: 2018-05-30
Payer: MEDICARE

## 2018-05-30 VITALS
OXYGEN SATURATION: 95 % | HEART RATE: 71 BPM | SYSTOLIC BLOOD PRESSURE: 130 MMHG | HEIGHT: 66 IN | DIASTOLIC BLOOD PRESSURE: 64 MMHG | BODY MASS INDEX: 39.82 KG/M2 | WEIGHT: 247.81 LBS | TEMPERATURE: 97 F

## 2018-05-30 DIAGNOSIS — I73.9 PVD (PERIPHERAL VASCULAR DISEASE): ICD-10-CM

## 2018-05-30 DIAGNOSIS — E78.5 HYPERLIPIDEMIA ASSOCIATED WITH TYPE 2 DIABETES MELLITUS: ICD-10-CM

## 2018-05-30 DIAGNOSIS — I10 ESSENTIAL HYPERTENSION: ICD-10-CM

## 2018-05-30 DIAGNOSIS — I48.21 PERMANENT ATRIAL FIBRILLATION: ICD-10-CM

## 2018-05-30 DIAGNOSIS — E11.69 HYPERLIPIDEMIA ASSOCIATED WITH TYPE 2 DIABETES MELLITUS: ICD-10-CM

## 2018-05-30 DIAGNOSIS — E11.40 TYPE 2 DIABETES MELLITUS WITH DIABETIC NEUROPATHY, WITHOUT LONG-TERM CURRENT USE OF INSULIN: Primary | ICD-10-CM

## 2018-05-30 PROCEDURE — 3075F SYST BP GE 130 - 139MM HG: CPT | Mod: CPTII,S$GLB,, | Performed by: PEDIATRICS

## 2018-05-30 PROCEDURE — 3078F DIAST BP <80 MM HG: CPT | Mod: CPTII,S$GLB,, | Performed by: PEDIATRICS

## 2018-05-30 PROCEDURE — 99214 OFFICE O/P EST MOD 30 MIN: CPT | Mod: S$GLB,,, | Performed by: PEDIATRICS

## 2018-05-30 PROCEDURE — 99999 PR PBB SHADOW E&M-EST. PATIENT-LVL III: CPT | Mod: PBBFAC,,, | Performed by: PEDIATRICS

## 2018-05-30 NOTE — PROGRESS NOTES
Subjective:       Patient ID: Raymond Stuart is a 78 y.o. male.     Chief Complaint: 6mo/lab     HTN: B/P good, no HTNive symptoms.    DM: no hyper/hypoglycemic symptoms. Am self monitoring BS run 100-150(slightly higher).  LIPIDS:not following D&E, tolerating and compliant with med(s).    PVD/cardio: followed by Dr Judge(upcoming in 2 weeks) and now seeing Dr Chance(11/17) through his [place] office.   DM foot ulcer: seeing Dr Castillo .     LABS REVIEWED AND DISCUSSED WITH PATIENT      Review of Systems   Constitutional: Negative for fever and unexpected weight change.   HENT: Negative for congestion and rhinorrhea.    Eyes: Negative for discharge and redness.   Respiratory: Negative for cough and wheezing.    Gastrointestinal: Negative for constipation, diarrhea and vomiting.   Genitourinary: Negative for decreased urine volume and difficulty urinating.   Musculoskeletal: Negative for arthralgias and joint swelling.   Skin: Negative for rash and wound except bruises easily due to coumadin.   Neurological: Negative for syncope and headaches.   Psychiatric/Behavioral: Negative for behavioral problems and sleep disturbance.      Objective:      Physical Exam   Constitutional: He is oriented to person, place, and time. He appears well-developed and well-nourished. No distress.   Neck: Normal range of motion. No JVD present. No thyromegaly present.   Cardiovascular: Normal heart sounds.    No murmur heard.  Rate controlled but irregular.   Pulmonary/Chest: Effort normal and breath sounds normal. No respiratory distress. He has no wheezes. He has no rales.   Abdominal: Soft. He exhibits no distension and no mass. There is no tenderness. There is no rebound and no guarding. No hernia.   Musculoskeletal: He exhibits edema (trace).   Distal pulses not palpable.   Lymphadenopathy:     He has no cervical adenopathy.   Neurological: He is alert and oriented to person, place, and time. No cranial nerve deficit.  Coordination normal.   Skin: Capillary refill takes less than 2 seconds. No rash noted.   Psychiatric: He has a normal mood and affect. His behavior is normal. Judgment and thought content normal.      Assessment:      1. Type 2 diabetes mellitus with diabetic neuropathy, without long-term current use of insulin    2. Essential hypertension    3. Hyperlipidemia associated with type 2 diabetes mellitus    4. Permanent atrial fibrillation    5. PVD (peripheral vascular disease)    6. Osteomyelitis of toe of left foot    7. 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 D&E as tolerated, weight loss. F/U 6 months with labs. shingles vaccine at pharmacy. He is up to date PCV- had PCV 23 before [facility].

## 2018-06-04 NOTE — PROGRESS NOTES
Subjective:     Patient ID: Raymond Stuart is a 78 y.o. male.    Chief Complaint: Follow-up    Raymond is a 78 y.o. male who presents to the clinic for evaluation and treatment of high risk feet. Raymond has a past medical history of Diabetes mellitus, type 2; Hyperlipidemia; Hypertension; Irregular heartbeat; PVD (peripheral vascular disease); S/P femoral-popliteal bypass surgery (8/8/2014); and S/P peripheral artery angioplasty (8/8/2014). The patient's chief complaint is foot check and routine nail care. Patient denies any trauma or drainage from the toe.  This patient has documented high risk feet requiring routine maintenance secondary to peripheral vascular disease. Patient states he did get his Diabetic shoes and inserts.     PCP: JOHNATHON Cristobal Jr, MD    Date Last Seen by PCP: 11/30/17    Current shoe gear:  Affected Foot: Rx diabetic extra depth shoes and custom accommodative insoles     Unaffected Foot: Rx diabetic extra depth shoes and custom accommodative insoles    History of Trauma: negative  Sign of Infection: none    Hemoglobin A1C   Date Value Ref Range Status   05/23/2018 7.1 (H) 4.0 - 5.6 % Final     Comment:     According to ADA guidelines, hemoglobin A1c <7.0% represents  optimal control in non-pregnant diabetic patients. Different  metrics may apply to specific patient populations.   Standards of Medical Care in Diabetes-2016.  For the purpose of screening for the presence of diabetes:  <5.7%     Consistent with the absence of diabetes  5.7-6.4%  Consistent with increasing risk for diabetes   (prediabetes)  >or=6.5%  Consistent with diabetes  Currently, no consensus exists for use of hemoglobin A1c  for diagnosis of diabetes for children.  This Hemoglobin A1c assay has significant interference with fetal   hemoglobin   (HbF). The results are invalid for patients with abnormal amounts of   HbF,   including those with known Hereditary Persistence   of Fetal Hemoglobin. Heterozygous hemoglobin variants  (HbAS, HbAC,   HbAD, HbAE, HbA2) do not significantly interfere with this assay;   however, presence of multiple variants in a sample may impact the %   interference.     11/15/2017 6.8 (H) 4.0 - 5.6 % Final     Comment:     According to ADA guidelines, hemoglobin A1c <7.0% represents  optimal control in non-pregnant diabetic patients. Different  metrics may apply to specific patient populations.   Standards of Medical Care in Diabetes-2016.  For the purpose of screening for the presence of diabetes:  <5.7%     Consistent with the absence of diabetes  5.7-6.4%  Consistent with increasing risk for diabetes   (prediabetes)  >or=6.5%  Consistent with diabetes  Currently, no consensus exists for use of hemoglobin A1c  for diagnosis of diabetes for children.  This Hemoglobin A1c assay has significant interference with fetal   hemoglobin   (HbF). The results are invalid for patients with abnormal amounts of   HbF,   including those with known Hereditary Persistence   of Fetal Hemoglobin. Heterozygous hemoglobin variants (HbAS, HbAC,   HbAD, HbAE, HbA2) do not significantly interfere with this assay;   however, presence of multiple variants in a sample may impact the %   interference.     05/16/2017 7.3 (H) 4.5 - 6.2 % Final     Comment:     According to ADA guidelines, hemoglobin A1C <7.0% represents  optimal control in non-pregnant diabetic patients.  Different  metrics may apply to specific populations.   Standards of Medical Care in Diabetes - 2016.  For the purpose of screening for the presence of diabetes:  <5.7%     Consistent with the absence of diabetes  5.7-6.4%  Consistent with increasing risk for diabetes   (prediabetes)  >or=6.5%  Consistent with diabetes  Currently no consensus exists for use of hemoglobin A1C  for diagnosis of diabetes for children.             Patient Active Problem List   Diagnosis    Hyperlipidemia associated with type 2 diabetes mellitus    Essential hypertension    A-fib    PVD  (peripheral vascular disease)    Special screening for malignant neoplasms, colon    S/P femoral-popliteal bypass surgery    S/P peripheral artery angioplasty    Type 2 diabetes mellitus with diabetic neuropathy    Osteomyelitis of toe of left foot       Medication List with Changes/Refills   Current Medications    ATENOLOL (TENORMIN) 100 MG TABLET    Take 1 tablet (100 mg total) by mouth once daily.    BLOOD-GLUCOSE METER KIT    To check BG 2 times daily, to use with insurance preferred meter    CLOPIDOGREL (PLAVIX) 75 MG TABLET    Take 1 tablet by mouth  daily    DAPTOMYCIN (CUBICIN) INJECTION        DIPHENHYDRAMINE (BENADRYL) 50 MG/ML INJECTION        FENOFIBRATE MICRONIZED (LOFIBRA) 134 MG CAP    Take 1 capsule (134 mg total) by mouth once daily.    FLUCONAZOLE (DIFLUCAN) 100 MG TABLET    Take 100 mg by mouth once daily.    GABAPENTIN (NEURONTIN) 600 MG TABLET    TAKE 2 TABLETS BY MOUTH TWO TIMES DAILY    GLIPIZIDE-METFORMIN (METAGLIP) 2.5-500 MG PER TABLET    Take 2 tablets by mouth  twice a day before meals in the morning and evening    LANCETS MISC    To check BG 2 times daily, to use with insurance preferred meter    LISINOPRIL-HYDROCHLOROTHIAZIDE (PRINZIDE,ZESTORETIC) 20-12.5 MG PER TABLET    Take 2 tablets by mouth once daily.    PREDNISONE (DELTASONE) 20 MG TABLET        ROSUVASTATIN (CRESTOR) 40 MG TAB    Take 1 tablet (40 mg total) by mouth once daily.    SANTYL OINTMENT        WARFARIN (COUMADIN) 5 MG TABLET    TAKE 1 AND 1/2 TABLETS BY  MOUTH ON WEDNESDAY AND  SUNDAY AND 1 TABLET ALL  REMAINING DAY AS DIRECTED  BY THE [facility] CLINIC.       Review of patient's allergies indicates:   Allergen Reactions    Sulfa (sulfonamide antibiotics)      Other reaction(s): Stomach upset    Sulfamethoxazole Rash    Trimethoprim Rash     Bactrim         Past Surgical History:   Procedure Laterality Date    CARDIAC ELECTROPHYSIOLOGY MAPPING AND ABLATION      POPLITEAL ARTERY STENT  2013    RECTOPERITONEAL  "FISTULA CLOSURE      TOE SURGERY      Joint release    TONSILLECTOMY         History reviewed. No pertinent family history.    Social History     Social History    Marital status:      Spouse name: N/A    Number of children: N/A    Years of education: N/A     Occupational History    Retired       [employer]     Social History Main Topics    Smoking status: Former Smoker     Packs/day: 4.00     Years: 30.00     Types: Cigarettes     Quit date: 1/9/1994    Smokeless tobacco: Never Used    Alcohol use No    Drug use: No    Sexual activity: Not on file     Other Topics Concern    Not on file     Social History Narrative    No narrative on file       Vitals:    05/25/18 1511   BP: (!) 144/77   Pulse: 86   Resp: 20   Weight: 114.1 kg (251 lb 8.7 oz)   Height: 5' 6" (1.676 m)   PainSc: 0-No pain       Hemoglobin A1C   Date Value Ref Range Status   05/23/2018 7.1 (H) 4.0 - 5.6 % Final     Comment:     According to ADA guidelines, hemoglobin A1c <7.0% represents  optimal control in non-pregnant diabetic patients. Different  metrics may apply to specific patient populations.   Standards of Medical Care in Diabetes-2016.  For the purpose of screening for the presence of diabetes:  <5.7%     Consistent with the absence of diabetes  5.7-6.4%  Consistent with increasing risk for diabetes   (prediabetes)  >or=6.5%  Consistent with diabetes  Currently, no consensus exists for use of hemoglobin A1c  for diagnosis of diabetes for children.  This Hemoglobin A1c assay has significant interference with fetal   hemoglobin   (HbF). The results are invalid for patients with abnormal amounts of   HbF,   including those with known Hereditary Persistence   of Fetal Hemoglobin. Heterozygous hemoglobin variants (HbAS, HbAC,   HbAD, HbAE, HbA2) do not significantly interfere with this assay;   however, presence of multiple variants in a sample may impact the %   interference.     11/15/2017 6.8 (H) 4.0 - 5.6 % Final     " Comment:     According to ADA guidelines, hemoglobin A1c <7.0% represents  optimal control in non-pregnant diabetic patients. Different  metrics may apply to specific patient populations.   Standards of Medical Care in Diabetes-2016.  For the purpose of screening for the presence of diabetes:  <5.7%     Consistent with the absence of diabetes  5.7-6.4%  Consistent with increasing risk for diabetes   (prediabetes)  >or=6.5%  Consistent with diabetes  Currently, no consensus exists for use of hemoglobin A1c  for diagnosis of diabetes for children.  This Hemoglobin A1c assay has significant interference with fetal   hemoglobin   (HbF). The results are invalid for patients with abnormal amounts of   HbF,   including those with known Hereditary Persistence   of Fetal Hemoglobin. Heterozygous hemoglobin variants (HbAS, HbAC,   HbAD, HbAE, HbA2) do not significantly interfere with this assay;   however, presence of multiple variants in a sample may impact the %   interference.     05/16/2017 7.3 (H) 4.5 - 6.2 % Final     Comment:     According to ADA guidelines, hemoglobin A1C <7.0% represents  optimal control in non-pregnant diabetic patients.  Different  metrics may apply to specific populations.   Standards of Medical Care in Diabetes - 2016.  For the purpose of screening for the presence of diabetes:  <5.7%     Consistent with the absence of diabetes  5.7-6.4%  Consistent with increasing risk for diabetes   (prediabetes)  >or=6.5%  Consistent with diabetes  Currently no consensus exists for use of hemoglobin A1C  for diagnosis of diabetes for children.         Review of Systems   Constitutional: Negative for chills and fever.   Respiratory: Negative for shortness of breath.    Cardiovascular: Negative for chest pain, palpitations, orthopnea, claudication and leg swelling.   Gastrointestinal: Negative for diarrhea, nausea and vomiting.   Musculoskeletal: Negative for joint pain.   Skin: Negative for rash.    Neurological: Positive for sensory change. Negative for dizziness, tingling, focal weakness and weakness.   Psychiatric/Behavioral: Negative.          Objective:      PHYSICAL EXAM: Apperance: Alert and orient in no distress,well developed, and with good attention to grooming and body habits  Patient presents ambulating in diabetic shoes and inserts.  Lower Extremity Exam  VASCULAR: Dorsalis pedis pulses 1/4 bilateral and Posterior Tibial pulses 1/4 bilateral. Capillary fill time <4 seconds bilateral. No edema observed bilateral. Varicosities present bilateral. Skin temperature of the lower extremities is warm to warm, proximal to distal. Hair growth absent bilateral. (--) lymphangitis or (--) cellulitis noted bilateral.  DERMATOLOGICAL: (--) edema, (--) erythema, (--) malodor, (-) drainage, (--) warmth to left foot. Left hallux nail absent. Nails 2,3,4 left and 2,3,4,5 right elongated, thickened, and discolored with subungual debris. The dorsum surface of the feet are soft and supple.  The plantar aspects of both feet are dry and scaly. Webspaces 1,2,3,4 clean, dry and without evidence of break in skin integrity bilateral.   NEUROLOGICAL: Light touch, sharp-dull, proprioception all present and equal bilaterally.    MUSCULOSKELETAL: Muscle strength is 5/5 for foot inverters, everters, plantarflexors, and dorsiflexors. Muscle tone is normal.  Inspection/palpation of bone, joints and muscles unremarkable with the exception of that noted above. 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            Assessment:       Encounter Diagnoses   Name Primary?    Dermatophytosis of nail Yes    PVD (peripheral vascular disease)     Type II diabetes mellitus with peripheral circulatory disorder          Plan:   Dermatophytosis of nail    PVD (peripheral vascular disease)    Type II diabetes mellitus with peripheral circulatory disorder      I counseled the patient on his conditions, regarding findings of my examination, my impressions, and  usual treatment plan.   Shoe inspection. Diabetic Foot Education. Patient reminded of the importance of good nutrition and blood sugar control to help prevent podiatric complications of diabetes. Patient instructed on proper foot hygeine. We discussed wearing proper shoe gear, daily foot inspections, never walking without protective shoe gear, never putting sharp instruments to feet.    With patient's permission, nails as noted above bilateral were debrided/trimmed in length and thickness aggressively to their soft tissue attachment mechanically and with electric , removing all offending nail and debris. Patient relates relief following the procedure.  Patient  will continue to monitor the areas daily, inspect feet, wear protective shoe gear when ambulatory, moisturizer to maintain skin integrity. Patient reminded of the importance of good nutrition and blood sugar control to help prevent podiatric complications of diabetes.  Patient to return 2 months or sooner if needed.                   Nic Castillo DPM  [facility]

## 2018-07-25 ENCOUNTER — OFFICE VISIT (OUTPATIENT)
Dept: PODIATRY | Facility: CLINIC | Age: 78
End: 2018-07-25
Payer: MEDICARE

## 2018-07-25 VITALS
HEART RATE: 95 BPM | DIASTOLIC BLOOD PRESSURE: 96 MMHG | BODY MASS INDEX: 41.66 KG/M2 | HEIGHT: 66 IN | RESPIRATION RATE: 20 BRPM | WEIGHT: 259.25 LBS | SYSTOLIC BLOOD PRESSURE: 193 MMHG

## 2018-07-25 DIAGNOSIS — I73.9 PVD (PERIPHERAL VASCULAR DISEASE): ICD-10-CM

## 2018-07-25 DIAGNOSIS — B35.1 DERMATOPHYTOSIS OF NAIL: Primary | ICD-10-CM

## 2018-07-25 DIAGNOSIS — E11.51 TYPE II DIABETES MELLITUS WITH PERIPHERAL CIRCULATORY DISORDER: ICD-10-CM

## 2018-07-25 PROCEDURE — 11721 DEBRIDE NAIL 6 OR MORE: CPT | Mod: Q7,S$GLB,, | Performed by: PODIATRIST

## 2018-07-25 PROCEDURE — 99999 PR PBB SHADOW E&M-EST. PATIENT-LVL III: CPT | Mod: PBBFAC,,, | Performed by: PODIATRIST

## 2018-07-25 PROCEDURE — 99499 UNLISTED E&M SERVICE: CPT | Mod: S$GLB,,, | Performed by: PODIATRIST

## 2018-08-02 NOTE — PROGRESS NOTES
Subjective:     Patient ID: Raymond Stuart is a 78 y.o. male.    Chief Complaint: Nail Care (last PCP visit was on 5/30/2018 with Dr. Cristobal)    Raymond is a 78 y.o. male who presents to the clinic for evaluation and treatment of high risk feet. Raymond has a past medical history of Diabetes mellitus, type 2; Hyperlipidemia; Hypertension; Irregular heartbeat; PVD (peripheral vascular disease); S/P femoral-popliteal bypass surgery (8/8/2014); and S/P peripheral artery angioplasty (8/8/2014). The patient's chief complaint is foot check and routine nail care.  This patient has documented high risk feet requiring routine maintenance secondary to peripheral vascular disease.       PCP: JOHNATHON Cristobal Jr, MD    Date Last Seen by PCP: 5/30/18    Current shoe gear:  Affected Foot: Rx diabetic extra depth shoes and custom accommodative insoles     Unaffected Foot: Rx diabetic extra depth shoes and custom accommodative insoles    History of Trauma: negative  Sign of Infection: none    Hemoglobin A1C   Date Value Ref Range Status   05/23/2018 7.1 (H) 4.0 - 5.6 % Final     Comment:     According to ADA guidelines, hemoglobin A1c <7.0% represents  optimal control in non-pregnant diabetic patients. Different  metrics may apply to specific patient populations.   Standards of Medical Care in Diabetes-2016.  For the purpose of screening for the presence of diabetes:  <5.7%     Consistent with the absence of diabetes  5.7-6.4%  Consistent with increasing risk for diabetes   (prediabetes)  >or=6.5%  Consistent with diabetes  Currently, no consensus exists for use of hemoglobin A1c  for diagnosis of diabetes for children.  This Hemoglobin A1c assay has significant interference with fetal   hemoglobin   (HbF). The results are invalid for patients with abnormal amounts of   HbF,   including those with known Hereditary Persistence   of Fetal Hemoglobin. Heterozygous hemoglobin variants (HbAS, HbAC,   HbAD, HbAE, HbA2) do not significantly  interfere with this assay;   however, presence of multiple variants in a sample may impact the %   interference.     11/15/2017 6.8 (H) 4.0 - 5.6 % Final     Comment:     According to ADA guidelines, hemoglobin A1c <7.0% represents  optimal control in non-pregnant diabetic patients. Different  metrics may apply to specific patient populations.   Standards of Medical Care in Diabetes-2016.  For the purpose of screening for the presence of diabetes:  <5.7%     Consistent with the absence of diabetes  5.7-6.4%  Consistent with increasing risk for diabetes   (prediabetes)  >or=6.5%  Consistent with diabetes  Currently, no consensus exists for use of hemoglobin A1c  for diagnosis of diabetes for children.  This Hemoglobin A1c assay has significant interference with fetal   hemoglobin   (HbF). The results are invalid for patients with abnormal amounts of   HbF,   including those with known Hereditary Persistence   of Fetal Hemoglobin. Heterozygous hemoglobin variants (HbAS, HbAC,   HbAD, HbAE, HbA2) do not significantly interfere with this assay;   however, presence of multiple variants in a sample may impact the %   interference.     05/16/2017 7.3 (H) 4.5 - 6.2 % Final     Comment:     According to ADA guidelines, hemoglobin A1C <7.0% represents  optimal control in non-pregnant diabetic patients.  Different  metrics may apply to specific populations.   Standards of Medical Care in Diabetes - 2016.  For the purpose of screening for the presence of diabetes:  <5.7%     Consistent with the absence of diabetes  5.7-6.4%  Consistent with increasing risk for diabetes   (prediabetes)  >or=6.5%  Consistent with diabetes  Currently no consensus exists for use of hemoglobin A1C  for diagnosis of diabetes for children.             Patient Active Problem List   Diagnosis    Hyperlipidemia associated with type 2 diabetes mellitus    Essential hypertension    A-fib    PVD (peripheral vascular disease)    Special screening for  malignant neoplasms, colon    S/P femoral-popliteal bypass surgery    S/P peripheral artery angioplasty    Type 2 diabetes mellitus with diabetic neuropathy    Osteomyelitis of toe of left foot       Medication List with Changes/Refills   Current Medications    ATENOLOL (TENORMIN) 100 MG TABLET    Take 1 tablet (100 mg total) by mouth once daily.    BLOOD-GLUCOSE METER KIT    To check BG 2 times daily, to use with insurance preferred meter    CLOPIDOGREL (PLAVIX) 75 MG TABLET    Take 1 tablet by mouth  daily    DAPTOMYCIN (CUBICIN) INJECTION        DIPHENHYDRAMINE (BENADRYL) 50 MG/ML INJECTION        FENOFIBRATE MICRONIZED (LOFIBRA) 134 MG CAP    Take 1 capsule (134 mg total) by mouth once daily.    FLUCONAZOLE (DIFLUCAN) 100 MG TABLET    Take 100 mg by mouth once daily.    GABAPENTIN (NEURONTIN) 600 MG TABLET    TAKE 2 TABLETS BY MOUTH TWO TIMES DAILY    GLIPIZIDE-METFORMIN (METAGLIP) 2.5-500 MG PER TABLET    Take 2 tablets by mouth  twice a day before meals in the morning and evening    LANCETS MISC    To check BG 2 times daily, to use with insurance preferred meter    LISINOPRIL-HYDROCHLOROTHIAZIDE (PRINZIDE,ZESTORETIC) 20-12.5 MG PER TABLET    Take 2 tablets by mouth once daily.    PREDNISONE (DELTASONE) 20 MG TABLET        ROSUVASTATIN (CRESTOR) 40 MG TAB    Take 1 tablet (40 mg total) by mouth once daily.    SANTYL OINTMENT        WARFARIN (COUMADIN) 5 MG TABLET    TAKE 1 AND 1/2 TABLETS BY  MOUTH ON WEDNESDAY AND  SUNDAY AND 1 TABLET ALL  REMAINING DAY AS DIRECTED  BY THE [facility] CLINIC.       Review of patient's allergies indicates:   Allergen Reactions    Sulfa (sulfonamide antibiotics)      Other reaction(s): Stomach upset    Sulfamethoxazole Rash    Trimethoprim Rash     Bactrim         Past Surgical History:   Procedure Laterality Date    CARDIAC ELECTROPHYSIOLOGY MAPPING AND ABLATION      POPLITEAL ARTERY STENT  2013    RECTOPERITONEAL FISTULA CLOSURE      TOE SURGERY      Joint release     "TONSILLECTOMY         History reviewed. No pertinent family history.    Social History     Social History    Marital status:      Spouse name: N/A    Number of children: N/A    Years of education: N/A     Occupational History    Retired [employer]     Social History Main Topics    Smoking status: Former Smoker     Packs/day: 4.00     Years: 30.00     Types: Cigarettes     Quit date: 1/9/1994    Smokeless tobacco: Never Used    Alcohol use No    Drug use: No    Sexual activity: Not on file     Other Topics Concern    Not on file     Social History Narrative    No narrative on file       Vitals:    07/25/18 1313   BP: (!) 193/96   Pulse: 95   Resp: 20   Weight: 117.6 kg (259 lb 4.2 oz)   Height: 5' 6" (1.676 m)   PainSc: 0-No pain       Hemoglobin A1C   Date Value Ref Range Status   05/23/2018 7.1 (H) 4.0 - 5.6 % Final     Comment:     According to ADA guidelines, hemoglobin A1c <7.0% represents  optimal control in non-pregnant diabetic patients. Different  metrics may apply to specific patient populations.   Standards of Medical Care in Diabetes-2016.  For the purpose of screening for the presence of diabetes:  <5.7%     Consistent with the absence of diabetes  5.7-6.4%  Consistent with increasing risk for diabetes   (prediabetes)  >or=6.5%  Consistent with diabetes  Currently, no consensus exists for use of hemoglobin A1c  for diagnosis of diabetes for children.  This Hemoglobin A1c assay has significant interference with fetal   hemoglobin   (HbF). The results are invalid for patients with abnormal amounts of   HbF,   including those with known Hereditary Persistence   of Fetal Hemoglobin. Heterozygous hemoglobin variants (HbAS, HbAC,   HbAD, HbAE, HbA2) do not significantly interfere with this assay;   however, presence of multiple variants in a sample may impact the %   interference.     11/15/2017 6.8 (H) 4.0 - 5.6 % Final     Comment:     According to ADA guidelines, hemoglobin A1c " <7.0% represents  optimal control in non-pregnant diabetic patients. Different  metrics may apply to specific patient populations.   Standards of Medical Care in Diabetes-2016.  For the purpose of screening for the presence of diabetes:  <5.7%     Consistent with the absence of diabetes  5.7-6.4%  Consistent with increasing risk for diabetes   (prediabetes)  >or=6.5%  Consistent with diabetes  Currently, no consensus exists for use of hemoglobin A1c  for diagnosis of diabetes for children.  This Hemoglobin A1c assay has significant interference with fetal   hemoglobin   (HbF). The results are invalid for patients with abnormal amounts of   HbF,   including those with known Hereditary Persistence   of Fetal Hemoglobin. Heterozygous hemoglobin variants (HbAS, HbAC,   HbAD, HbAE, HbA2) do not significantly interfere with this assay;   however, presence of multiple variants in a sample may impact the %   interference.     05/16/2017 7.3 (H) 4.5 - 6.2 % Final     Comment:     According to ADA guidelines, hemoglobin A1C <7.0% represents  optimal control in non-pregnant diabetic patients.  Different  metrics may apply to specific populations.   Standards of Medical Care in Diabetes - 2016.  For the purpose of screening for the presence of diabetes:  <5.7%     Consistent with the absence of diabetes  5.7-6.4%  Consistent with increasing risk for diabetes   (prediabetes)  >or=6.5%  Consistent with diabetes  Currently no consensus exists for use of hemoglobin A1C  for diagnosis of diabetes for children.         Review of Systems   Constitutional: Negative for chills and fever.   Respiratory: Negative for shortness of breath.    Cardiovascular: Negative for chest pain, palpitations, orthopnea, claudication and leg swelling.   Gastrointestinal: Negative for diarrhea, nausea and vomiting.   Musculoskeletal: Negative for joint pain.   Skin: Negative for rash.   Neurological: Positive for sensory change. Negative for dizziness,  tingling, focal weakness and weakness.   Psychiatric/Behavioral: Negative.          Objective:      PHYSICAL EXAM: Apperance: Alert and orient in no distress,well developed, and with good attention to grooming and body habits  Patient presents ambulating in diabetic shoes and inserts.  Lower Extremity Exam  VASCULAR: Dorsalis pedis pulses 1/4 bilateral and Posterior Tibial pulses 1/4 bilateral. Capillary fill time <4 seconds bilateral. No edema observed bilateral. Varicosities present bilateral. Skin temperature of the lower extremities is warm to warm, proximal to distal. Hair growth absent bilateral. (--) lymphangitis or (--) cellulitis noted bilateral.  DERMATOLOGICAL: (--) edema, (--) erythema, (--) malodor, (-) drainage, (--) warmth to left foot. Left hallux nail absent. Nails 2,3,4 left and 2,3,4,5 right elongated, thickened, and discolored with subungual debris. The dorsum surface of the feet are soft and supple.  The plantar aspects of both feet are dry and scaly. Webspaces 1,2,3,4 clean, dry and without evidence of break in skin integrity bilateral.   NEUROLOGICAL: Light touch, sharp-dull, proprioception all present and equal bilaterally.    MUSCULOSKELETAL: Muscle strength is 5/5 for foot inverters, everters, plantarflexors, and dorsiflexors. Muscle tone is normal.  Inspection/palpation of bone, joints and muscles unremarkable with the exception of that noted above. 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            Assessment:       Encounter Diagnoses   Name Primary?    Dermatophytosis of nail Yes    PVD (peripheral vascular disease)     Type II diabetes mellitus with peripheral circulatory disorder          Plan:   Dermatophytosis of nail    PVD (peripheral vascular disease)    Type II diabetes mellitus with peripheral circulatory disorder      I counseled the patient on his conditions, regarding findings of my examination, my impressions, and usual treatment plan.   Shoe inspection. Diabetic Foot Education.  Patient reminded of the importance of good nutrition and blood sugar control to help prevent podiatric complications of diabetes. Patient instructed on proper foot hygeine. We discussed wearing proper shoe gear, daily foot inspections, never walking without protective shoe gear, never putting sharp instruments to feet.    With patient's permission, nails as noted above bilateral were debrided/trimmed in length and thickness aggressively to their soft tissue attachment mechanically and with electric , removing all offending nail and debris. Patient relates relief following the procedure.  Patient  will continue to monitor the areas daily, inspect feet, wear protective shoe gear when ambulatory, moisturizer to maintain skin integrity. Patient reminded of the importance of good nutrition and blood sugar control to help prevent podiatric complications of diabetes.  Patient to return 2 months or sooner if needed.                   Nic Castillo DPM  [facility]

## 2018-08-16 DIAGNOSIS — R80.9 TYPE 2 DIABETES MELLITUS WITH MICROALBUMINURIA, WITHOUT LONG-TERM CURRENT USE OF INSULIN: ICD-10-CM

## 2018-08-16 DIAGNOSIS — I73.9 PVD (PERIPHERAL VASCULAR DISEASE): ICD-10-CM

## 2018-08-16 DIAGNOSIS — E78.5 HYPERLIPIDEMIA ASSOCIATED WITH TYPE 2 DIABETES MELLITUS: ICD-10-CM

## 2018-08-16 DIAGNOSIS — E11.29 TYPE 2 DIABETES MELLITUS WITH MICROALBUMINURIA, WITHOUT LONG-TERM CURRENT USE OF INSULIN: ICD-10-CM

## 2018-08-16 DIAGNOSIS — E11.69 HYPERLIPIDEMIA ASSOCIATED WITH TYPE 2 DIABETES MELLITUS: ICD-10-CM

## 2018-08-16 DIAGNOSIS — I10 ESSENTIAL HYPERTENSION: ICD-10-CM

## 2018-08-17 RX ORDER — GLIPIZIDE AND METFORMIN HCL 2.5; 5 MG/1; MG/1
TABLET, FILM COATED ORAL
Qty: 360 TABLET | Refills: 3 | Status: SHIPPED | OUTPATIENT
Start: 2018-08-17

## 2018-08-17 RX ORDER — WARFARIN SODIUM 5 MG/1
TABLET ORAL
Qty: 96 TABLET | Refills: 3 | Status: SHIPPED | OUTPATIENT
Start: 2018-08-17

## 2018-08-17 RX ORDER — CLOPIDOGREL BISULFATE 75 MG/1
TABLET ORAL
Qty: 90 TABLET | Refills: 3 | Status: SHIPPED | OUTPATIENT
Start: 2018-08-17 | End: 2019-06-27 | Stop reason: SDUPTHER

## 2018-08-17 RX ORDER — ROSUVASTATIN CALCIUM 40 MG/1
TABLET, COATED ORAL
Qty: 90 TABLET | Refills: 3 | Status: SHIPPED | OUTPATIENT
Start: 2018-08-17

## 2018-08-17 RX ORDER — ATENOLOL 100 MG/1
TABLET ORAL
Qty: 90 TABLET | Refills: 3 | Status: SHIPPED | OUTPATIENT
Start: 2018-08-17

## 2018-09-18 DIAGNOSIS — I10 ESSENTIAL HYPERTENSION: ICD-10-CM

## 2018-09-18 DIAGNOSIS — E11.29 TYPE 2 DIABETES MELLITUS WITH MICROALBUMINURIA, WITHOUT LONG-TERM CURRENT USE OF INSULIN: ICD-10-CM

## 2018-09-18 DIAGNOSIS — R80.9 TYPE 2 DIABETES MELLITUS WITH MICROALBUMINURIA, WITHOUT LONG-TERM CURRENT USE OF INSULIN: ICD-10-CM

## 2018-09-19 RX ORDER — LISINOPRIL AND HYDROCHLOROTHIAZIDE 12.5; 2 MG/1; MG/1
2 TABLET ORAL DAILY
Qty: 180 TABLET | Refills: 3 | Status: SHIPPED | OUTPATIENT
Start: 2018-09-19 | End: 2019-07-28 | Stop reason: SDUPTHER

## 2018-09-26 ENCOUNTER — TELEPHONE (OUTPATIENT)
Dept: PODIATRY | Facility: CLINIC | Age: 78
End: 2018-09-26

## 2018-09-26 ENCOUNTER — TELEPHONE (OUTPATIENT)
Dept: CARDIOLOGY | Facility: CLINIC | Age: 78
End: 2018-09-26

## 2018-09-26 ENCOUNTER — OFFICE VISIT (OUTPATIENT)
Dept: PODIATRY | Facility: CLINIC | Age: 78
End: 2018-09-26
Payer: MEDICARE

## 2018-09-26 VITALS
HEART RATE: 74 BPM | BODY MASS INDEX: 39.86 KG/M2 | DIASTOLIC BLOOD PRESSURE: 70 MMHG | WEIGHT: 248 LBS | HEIGHT: 66 IN | SYSTOLIC BLOOD PRESSURE: 148 MMHG

## 2018-09-26 DIAGNOSIS — E11.51 TYPE II DIABETES MELLITUS WITH PERIPHERAL CIRCULATORY DISORDER: ICD-10-CM

## 2018-09-26 DIAGNOSIS — B35.1 DERMATOPHYTOSIS OF NAIL: Primary | ICD-10-CM

## 2018-09-26 DIAGNOSIS — L84 PRE-ULCERATIVE CALLUSES: ICD-10-CM

## 2018-09-26 DIAGNOSIS — I73.9 PVD (PERIPHERAL VASCULAR DISEASE): ICD-10-CM

## 2018-09-26 PROCEDURE — 99999 PR PBB SHADOW E&M-EST. PATIENT-LVL III: CPT | Mod: PBBFAC,,, | Performed by: PODIATRIST

## 2018-09-26 PROCEDURE — 11056 PARNG/CUTG B9 HYPRKR LES 2-4: CPT | Mod: S$PBB,,, | Performed by: PODIATRIST

## 2018-09-26 PROCEDURE — 11721 DEBRIDE NAIL 6 OR MORE: CPT | Mod: Q7,PBBFAC,PO | Performed by: PODIATRIST

## 2018-09-26 PROCEDURE — 99499 UNLISTED E&M SERVICE: CPT | Mod: S$PBB,,, | Performed by: PODIATRIST

## 2018-09-26 PROCEDURE — 11721 DEBRIDE NAIL 6 OR MORE: CPT | Mod: 59,S$PBB,, | Performed by: PODIATRIST

## 2018-09-26 PROCEDURE — 99213 OFFICE O/P EST LOW 20 MIN: CPT | Mod: PBBFAC,PO,25 | Performed by: PODIATRIST

## 2018-09-26 PROCEDURE — 11056 PARNG/CUTG B9 HYPRKR LES 2-4: CPT | Mod: Q7,PBBFAC,PO | Performed by: PODIATRIST

## 2018-09-26 NOTE — TELEPHONE ENCOUNTER
----- Message from Marvin Hodge sent at 9/26/2018 12:12 PM CDT -----  Chiara ( [facility] ) is requesting a call from nurse to discuss a pt.          Please call Chiara ( [facility] ) [phone] est [phone]

## 2018-09-26 NOTE — TELEPHONE ENCOUNTER
Returned call to Blu and advised Dr. Flores was not ordering MD for supplies requesting signature.  Patient was cleared for Peripheral Vascular surgery and followed by Dr. CATIA Castillo and that she is who needs to sign off on all dressing orders.

## 2018-09-26 NOTE — PROGRESS NOTES
Subjective:     Patient ID: Raymond Stuart is a 78 y.o. male.    Chief Complaint: Routine Foot Care (PCP: DELIA Cristobal 5/30/2018 , diabetic nail care/feet check )    Raymond is a 78 y.o. male who presents to the clinic for evaluation and treatment of high risk feet. Raymond has a past medical history of Diabetes mellitus, type 2, Hyperlipidemia, Hypertension, Irregular heartbeat, PVD (peripheral vascular disease), S/P femoral-popliteal bypass surgery (8/8/2014), and S/P peripheral artery angioplasty (8/8/2014). The patient's chief complaint is foot check and routine nail care.  This patient has documented high risk feet requiring routine maintenance secondary to peripheral vascular disease. Patient states he is scheduled to have another procedure with Dr. Judge on Friday.       PCP: JOHNATHON Cristobal Jr, MD    Date Last Seen by PCP: 5/30/18    Current shoe gear:  Affected Foot: Rx diabetic extra depth shoes and custom accommodative insoles     Unaffected Foot: Rx diabetic extra depth shoes and custom accommodative insoles    History of Trauma: negative  Sign of Infection: none    Hemoglobin A1C   Date Value Ref Range Status   05/23/2018 7.1 (H) 4.0 - 5.6 % Final     Comment:     According to ADA guidelines, hemoglobin A1c <7.0% represents  optimal control in non-pregnant diabetic patients. Different  metrics may apply to specific patient populations.   Standards of Medical Care in Diabetes-2016.  For the purpose of screening for the presence of diabetes:  <5.7%     Consistent with the absence of diabetes  5.7-6.4%  Consistent with increasing risk for diabetes   (prediabetes)  >or=6.5%  Consistent with diabetes  Currently, no consensus exists for use of hemoglobin A1c  for diagnosis of diabetes for children.  This Hemoglobin A1c assay has significant interference with fetal   hemoglobin   (HbF). The results are invalid for patients with abnormal amounts of   HbF,   including those with known Hereditary Persistence   of  Fetal Hemoglobin. Heterozygous hemoglobin variants (HbAS, HbAC,   HbAD, HbAE, HbA2) do not significantly interfere with this assay;   however, presence of multiple variants in a sample may impact the %   interference.     11/15/2017 6.8 (H) 4.0 - 5.6 % Final     Comment:     According to ADA guidelines, hemoglobin A1c <7.0% represents  optimal control in non-pregnant diabetic patients. Different  metrics may apply to specific patient populations.   Standards of Medical Care in Diabetes-2016.  For the purpose of screening for the presence of diabetes:  <5.7%     Consistent with the absence of diabetes  5.7-6.4%  Consistent with increasing risk for diabetes   (prediabetes)  >or=6.5%  Consistent with diabetes  Currently, no consensus exists for use of hemoglobin A1c  for diagnosis of diabetes for children.  This Hemoglobin A1c assay has significant interference with fetal   hemoglobin   (HbF). The results are invalid for patients with abnormal amounts of   HbF,   including those with known Hereditary Persistence   of Fetal Hemoglobin. Heterozygous hemoglobin variants (HbAS, HbAC,   HbAD, HbAE, HbA2) do not significantly interfere with this assay;   however, presence of multiple variants in a sample may impact the %   interference.     05/16/2017 7.3 (H) 4.5 - 6.2 % Final     Comment:     According to ADA guidelines, hemoglobin A1C <7.0% represents  optimal control in non-pregnant diabetic patients.  Different  metrics may apply to specific populations.   Standards of Medical Care in Diabetes - 2016.  For the purpose of screening for the presence of diabetes:  <5.7%     Consistent with the absence of diabetes  5.7-6.4%  Consistent with increasing risk for diabetes   (prediabetes)  >or=6.5%  Consistent with diabetes  Currently no consensus exists for use of hemoglobin A1C  for diagnosis of diabetes for children.             Patient Active Problem List   Diagnosis    Hyperlipidemia associated with type 2 diabetes  mellitus    Essential hypertension    A-fib    PVD (peripheral vascular disease)    Special screening for malignant neoplasms, colon    S/P femoral-popliteal bypass surgery    S/P peripheral artery angioplasty    Type 2 diabetes mellitus with diabetic neuropathy    Osteomyelitis of toe of left foot          Medication List           Accurate as of 9/26/18  6:04 PM. If you have any questions, ask your nurse or doctor.               CONTINUE taking these medications    atenolol 100 MG tablet  Commonly known as:  TENORMIN  TAKE 1 TABLET BY MOUTH ONCE DAILY     blood-glucose meter kit  To check BG 2 times daily, to use with insurance preferred meter     clopidogrel 75 mg tablet  Commonly known as:  PLAVIX  TAKE 1 TABLET BY MOUTH  DAILY     DAPTOmycin 500 mg injection  Commonly known as:  CUBICIN     diphenhydrAMINE 50 mg/mL injection  Commonly known as:  BENADRYL     fenofibrate micronized 134 MG Cap  Commonly known as:  LOFIBRA  Take 1 capsule (134 mg total) by mouth once daily.     fluconazole 100 MG tablet  Commonly known as:  DIFLUCAN     gabapentin 600 MG tablet  Commonly known as:  NEURONTIN  TAKE 2 TABLETS BY MOUTH TWO TIMES DAILY     glipizide-metformin 2.5-500 mg per tablet  Commonly known as:  METAGLIP  TAKE 2 TABLETS BY MOUTH  TWICE A DAY BEFORE MEALS IN THE MORNING AND EVENING     lancets Misc  To check BG 2 times daily, to use with insurance preferred meter     lisinopril-hydrochlorothiazide 20-12.5 mg per tablet  Commonly known as:  PRINZIDE,ZESTORETIC  TAKE 2 TABLETS BY MOUTH  ONCE DAILY     predniSONE 20 MG tablet  Commonly known as:  DELTASONE     rosuvastatin 40 MG Tab  Commonly known as:  CRESTOR  TAKE 1 TABLET BY MOUTH ONCE DAILY     SANTYL ointment  Generic drug:  collagenase     warfarin 5 MG tablet  Commonly known as:  COUMADIN  TAKE 1 AND 1/2 TABLETS BY  MOUTH ON WEDNESDAY AND  SUNDAY AND 1 TABLET ALL  REMAINING DAYS AS DIRECTED  BY THE COUMADIN CLINIC.            Review of patient's  "allergies indicates:   Allergen Reactions    Sulfa (sulfonamide antibiotics)      Other reaction(s): Stomach upset    Sulfamethoxazole Rash    Trimethoprim Rash     Bactrim         Past Surgical History:   Procedure Laterality Date    AMPUTATION- TOE Left 2017    [facility]    CARDIAC ELECTROPHYSIOLOGY MAPPING AND ABLATION      COLONOSCOPY N/A 2014    Performed by Yinka Clifford MD at [facility] ENDO    POPLITEAL ARTERY STENT  2013    RECTOPERITONEAL FISTULA CLOSURE      TOE SURGERY      Joint release    TONSILLECTOMY         History reviewed. No pertinent family history.    Social History     Socioeconomic History    Marital status:      Spouse name: Not on file    Number of children: Not on file    Years of education: Not on file    Highest education level: Not on file   Social Needs    Financial resource strain: Not on file    Food insecurity - worry: Not on file    Food insecurity - inability: Not on file    Transportation needs - medical: Not on file    Transportation needs - non-medical: Not on file   Occupational History    Occupation: Retired      Comment: [id number]   Tobacco Use    Smoking status: Former Smoker     Packs/day: 4.00     Years: 30.00     Pack years: 120.00     Types: Cigarettes     Last attempt to quit: 1994     Years since quittin.7    Smokeless tobacco: Never Used   Substance and Sexual Activity    Alcohol use: No    Drug use: No    Sexual activity: Not on file   Other Topics Concern    Not on file   Social History Narrative    Not on file       Vitals:    18 1342   BP: (!) 148/70   Pulse: 74   Weight: 112.5 kg (248 lb 0.3 oz)   Height: 5' 6" (1.676 m)   PainSc: 0-No pain       Hemoglobin A1C   Date Value Ref Range Status   2018 7.1 (H) 4.0 - 5.6 % Final     Comment:     According to ADA guidelines, hemoglobin A1c <7.0% represents  optimal control in non-pregnant diabetic patients. " Different  metrics may apply to specific patient populations.   Standards of Medical Care in Diabetes-2016.  For the purpose of screening for the presence of diabetes:  <5.7%     Consistent with the absence of diabetes  5.7-6.4%  Consistent with increasing risk for diabetes   (prediabetes)  >or=6.5%  Consistent with diabetes  Currently, no consensus exists for use of hemoglobin A1c  for diagnosis of diabetes for children.  This Hemoglobin A1c assay has significant interference with fetal   hemoglobin   (HbF). The results are invalid for patients with abnormal amounts of   HbF,   including those with known Hereditary Persistence   of Fetal Hemoglobin. Heterozygous hemoglobin variants (HbAS, HbAC,   HbAD, HbAE, HbA2) do not significantly interfere with this assay;   however, presence of multiple variants in a sample may impact the %   interference.     11/15/2017 6.8 (H) 4.0 - 5.6 % Final     Comment:     According to ADA guidelines, hemoglobin A1c <7.0% represents  optimal control in non-pregnant diabetic patients. Different  metrics may apply to specific patient populations.   Standards of Medical Care in Diabetes-2016.  For the purpose of screening for the presence of diabetes:  <5.7%     Consistent with the absence of diabetes  5.7-6.4%  Consistent with increasing risk for diabetes   (prediabetes)  >or=6.5%  Consistent with diabetes  Currently, no consensus exists for use of hemoglobin A1c  for diagnosis of diabetes for children.  This Hemoglobin A1c assay has significant interference with fetal   hemoglobin   (HbF). The results are invalid for patients with abnormal amounts of   HbF,   including those with known Hereditary Persistence   of Fetal Hemoglobin. Heterozygous hemoglobin variants (HbAS, HbAC,   HbAD, HbAE, HbA2) do not significantly interfere with this assay;   however, presence of multiple variants in a sample may impact the %   interference.     05/16/2017 7.3 (H) 4.5 - 6.2 % Final     Comment:      According to ADA guidelines, hemoglobin A1C <7.0% represents  optimal control in non-pregnant diabetic patients.  Different  metrics may apply to specific populations.   Standards of Medical Care in Diabetes - 2016.  For the purpose of screening for the presence of diabetes:  <5.7%     Consistent with the absence of diabetes  5.7-6.4%  Consistent with increasing risk for diabetes   (prediabetes)  >or=6.5%  Consistent with diabetes  Currently no consensus exists for use of hemoglobin A1C  for diagnosis of diabetes for children.         Review of Systems   Constitutional: Negative for chills and fever.   Respiratory: Negative for shortness of breath.    Cardiovascular: Negative for chest pain, palpitations, orthopnea, claudication and leg swelling.   Gastrointestinal: Negative for diarrhea, nausea and vomiting.   Musculoskeletal: Negative for joint pain.   Skin: Negative for rash.   Neurological: Positive for sensory change. Negative for dizziness, tingling, focal weakness and weakness.   Psychiatric/Behavioral: Negative.          Objective:      PHYSICAL EXAM: Apperance: Alert and orient in no distress,well developed, and with good attention to grooming and body habits  Patient presents ambulating in diabetic shoes and inserts.  Lower Extremity Exam  VASCULAR: Dorsalis pedis pulses 1/4 bilateral and Posterior Tibial pulses 1/4 bilateral. Capillary fill time <4 seconds bilateral. No edema observed bilateral. Varicosities present bilateral. Skin temperature of the lower extremities is warm to warm, proximal to distal. Hair growth absent bilateral. (--) lymphangitis or (--) cellulitis noted bilateral.  DERMATOLOGICAL: (--) edema, (--) erythema, (--) malodor, (-) drainage, (--) warmth to left foot. Left hallux nail absent. Nails 2,3,4 left and 2,3,4,5 right elongated, thickened, and discolored with subungual debris. The dorsum surface of the feet are soft and supple.  The plantar aspects of both feet are dry and scaly.  Webspaces 1,2,3,4 clean, dry and without evidence of break in skin integrity bilateral. Thin hyperkeratotic tissue noted to right partial hallux and plantar left 5th submetatarsal.   NEUROLOGICAL: Light touch, sharp-dull, proprioception all present and equal bilaterally.    MUSCULOSKELETAL: Muscle strength is 5/5 for foot inverters, everters, plantarflexors, and dorsiflexors. Muscle tone is normal.  Inspection/palpation of bone, joints and muscles unremarkable with the exception of that noted above. 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            Assessment:       Encounter Diagnoses   Name Primary?    Dermatophytosis of nail Yes    PVD (peripheral vascular disease)     Type II diabetes mellitus with peripheral circulatory disorder     Pre-ulcerative calluses          Plan:   Dermatophytosis of nail    PVD (peripheral vascular disease)    Type II diabetes mellitus with peripheral circulatory disorder    Pre-ulcerative calluses      I counseled the patient on his conditions, regarding findings of my examination, my impressions, and usual treatment plan.   Greater than 50% of this visit spent on counseling and coordination of care.  Greater than 20 minutes spent on education about the PVD, and prevention of limb loss.  Shoe inspection. Patient instructed on proper foot hygeine. We discussed wearing proper shoe gear, daily foot inspections, never walking without protective shoe gear, never putting sharp instruments to feet.    With patient's permission, nails as noted above bilateral were trimmed in length and thickness aggressively to their soft tissue attachment mechanically and with electric , removing all offending nail and debris. Patient relates relief following the procedure.  With patient's permission, bilateral callus trimmed in thickness with #15 blade in thickness without incident.   Patient  will continue to monitor the areas daily, inspect feet, wear protective shoe gear when ambulatory, moisturizer to  maintain skin integrity.   Patient to return 2 months or sooner if needed.                  iNc Castillo DPM  [facility]

## 2018-09-26 NOTE — TELEPHONE ENCOUNTER
Chiara Hurt was given a return call, but Josseline answered. Josseline informed me that she needed orders signed from 2017 in regards to Mr. Stuart wound care supplies. She asked for Dr. Castillo NPI number as well as our fax number. She was given those two items, and the matter was taken care of.

## 2018-09-27 ENCOUNTER — TELEPHONE (OUTPATIENT)
Dept: PODIATRY | Facility: CLINIC | Age: 78
End: 2018-09-27

## 2018-09-27 NOTE — TELEPHONE ENCOUNTER
----- Message from Marvin Hodge sent at 9/27/2018 12:07 PM CDT -----  Leah Hurt ) is requesting all documents be faxed together [id number] (Doc 1) [id number] (Doc 2) [id number] (doc 3) [id number] ( doc 4) [id number] ( doc 4) [id number] ( Doc  5)           Please callLeah Hurt )  pt back at [phone] ext [phone]

## 2018-10-23 ENCOUNTER — TELEPHONE (OUTPATIENT)
Dept: INTERNAL MEDICINE | Facility: CLINIC | Age: 78
End: 2018-10-23

## 2018-10-23 NOTE — TELEPHONE ENCOUNTER
SW [facility] rx regarding pt's rx, one touch ultra blue test strips and lancets. He states that he needs a quanity change from 300 to 200. 300 is too much for 90 day supply since pt is only testing twice a day. I gave a verbal okay that was fine. Pharmacist verbalized understanding.

## 2018-10-26 ENCOUNTER — OFFICE VISIT (OUTPATIENT)
Dept: INTERNAL MEDICINE | Facility: CLINIC | Age: 78
End: 2018-10-26
Payer: MEDICARE

## 2018-10-26 ENCOUNTER — HOSPITAL ENCOUNTER (OUTPATIENT)
Dept: RADIOLOGY | Facility: HOSPITAL | Age: 78
Discharge: HOME | End: 2018-10-26
Attending: PEDIATRICS
Payer: MEDICARE

## 2018-10-26 VITALS
HEART RATE: 78 BPM | OXYGEN SATURATION: 97 % | DIASTOLIC BLOOD PRESSURE: 76 MMHG | HEIGHT: 66 IN | BODY MASS INDEX: 39.62 KG/M2 | SYSTOLIC BLOOD PRESSURE: 152 MMHG | TEMPERATURE: 96 F | WEIGHT: 246.5 LBS

## 2018-10-26 DIAGNOSIS — J18.9 PNEUMONIA OF RIGHT LOWER LOBE DUE TO INFECTIOUS ORGANISM: ICD-10-CM

## 2018-10-26 DIAGNOSIS — I10 ESSENTIAL HYPERTENSION: ICD-10-CM

## 2018-10-26 DIAGNOSIS — E11.40 TYPE 2 DIABETES MELLITUS WITH DIABETIC NEUROPATHY, WITHOUT LONG-TERM CURRENT USE OF INSULIN: ICD-10-CM

## 2018-10-26 DIAGNOSIS — J18.9 PNEUMONIA OF RIGHT LOWER LOBE DUE TO INFECTIOUS ORGANISM: Primary | ICD-10-CM

## 2018-10-26 PROCEDURE — 90662 IIV NO PRSV INCREASED AG IM: CPT | Mod: PBBFAC,PO

## 2018-10-26 PROCEDURE — 99214 OFFICE O/P EST MOD 30 MIN: CPT | Mod: S$PBB,,, | Performed by: PEDIATRICS

## 2018-10-26 PROCEDURE — 99215 OFFICE O/P EST HI 40 MIN: CPT | Mod: PBBFAC,25,PO | Performed by: PEDIATRICS

## 2018-10-26 PROCEDURE — 99999 PR PBB SHADOW E&M-EST. PATIENT-LVL V: CPT | Mod: PBBFAC,,, | Performed by: PEDIATRICS

## 2018-10-26 PROCEDURE — 3077F SYST BP >= 140 MM HG: CPT | Mod: CPTII,,, | Performed by: PEDIATRICS

## 2018-10-26 PROCEDURE — 3078F DIAST BP <80 MM HG: CPT | Mod: CPTII,,, | Performed by: PEDIATRICS

## 2018-10-26 PROCEDURE — 71046 X-RAY EXAM CHEST 2 VIEWS: CPT | Mod: 26,,, | Performed by: RADIOLOGY

## 2018-10-26 PROCEDURE — 1101F PT FALLS ASSESS-DOCD LE1/YR: CPT | Mod: CPTII,,, | Performed by: PEDIATRICS

## 2018-10-26 PROCEDURE — 71046 X-RAY EXAM CHEST 2 VIEWS: CPT | Mod: TC,FY,PO

## 2018-10-26 PROCEDURE — 90732 PPSV23 VACC 2 YRS+ SUBQ/IM: CPT | Mod: PBBFAC,PO

## 2018-10-26 RX ORDER — AMOXICILLIN 500 MG/1
500 CAPSULE ORAL 3 TIMES DAILY
COMMUNITY
Start: 2018-10-21 | End: 2018-12-05

## 2018-10-26 RX ORDER — DOXYCYCLINE 100 MG/1
CAPSULE ORAL
COMMUNITY
Start: 2018-10-21 | End: 2018-12-05

## 2018-10-26 NOTE — PROGRESS NOTES
Subjective:       Patient ID: Raymond Stuart is a 78 y.o. male.    Chief Complaint: No chief complaint on file.    Has had 3 weeks of productive cough, fever, chills(mild). Took mucinex dm w/o improvement. Finally went to [facility] and dx with RML pneumonia and placed on amoxil/doxy. Is better. BS and BP stable.      Review of Systems   Constitutional: Negative for fever and unexpected weight change.   HENT: Positive for congestion. Negative for rhinorrhea.    Eyes: Negative for discharge and redness.   Respiratory: Positive for cough. Negative for shortness of breath and wheezing.    Cardiovascular: Negative for chest pain, palpitations and leg swelling.   Gastrointestinal: Negative for constipation, diarrhea and vomiting.   Endocrine: Negative for cold intolerance, heat intolerance, polydipsia, polyphagia and polyuria.   Genitourinary: Negative for decreased urine volume and difficulty urinating.   Musculoskeletal: Negative for arthralgias and joint swelling.   Skin: Negative for rash and wound.   Neurological: Negative for syncope and headaches.   Psychiatric/Behavioral: Negative for behavioral problems and sleep disturbance.       Objective:      Physical Exam   Constitutional: He is oriented to person, place, and time. He appears well-developed and well-nourished. No distress.   HENT:   Right Ear: External ear normal.   Left Ear: External ear normal.   Nose: Nose normal.   Mouth/Throat: Oropharynx is clear and moist. No oropharyngeal exudate.   Eyes: Conjunctivae and EOM are normal. Pupils are equal, round, and reactive to light.   Neck: Normal range of motion. No JVD present. No thyromegaly present.   Cardiovascular: Normal rate, regular rhythm and normal heart sounds.   No murmur heard.  Pulmonary/Chest: Effort normal and breath sounds normal. No respiratory distress. He has no wheezes. He has no rales.   Abdominal: Soft. He exhibits no distension and no mass. There is no tenderness. There is no rebound  and no guarding.   Musculoskeletal: He exhibits no edema.   Lymphadenopathy:     He has no cervical adenopathy.   Neurological: He is alert and oriented to person, place, and time. No cranial nerve deficit. Coordination normal.   Skin: Capillary refill takes less than 2 seconds. No rash noted.   Psychiatric: He has a normal mood and affect. His behavior is normal. Judgment and thought content normal.       Assessment:       1. Pneumonia of right lower lobe due to infectious organism        Plan:       Pneumonia of right lower lobe due to infectious organism  -     X-Ray Chest PA And Lateral; Future; Expected date: 10/26/2018  -     X-Ray Chest PA And Lateral; Future; Expected date: 10/26/2018    Other orders  -     Pneumococcal Polysaccharide Vaccine (23 Valent) (SQ/IM)    CXR showed chronic changes but rml infiltrate. Patient is better. Finish antibiotics, repeat CXR in 3 weeks. Patient has f/u in 1 month. Flu and PCV 23(rebooster) vaccine now and

## 2018-11-23 ENCOUNTER — HOSPITAL ENCOUNTER (OUTPATIENT)
Dept: RADIOLOGY | Facility: HOSPITAL | Age: 78
Discharge: HOME | End: 2018-11-23
Attending: PEDIATRICS
Payer: MEDICARE

## 2018-11-23 DIAGNOSIS — J18.9 PNEUMONIA OF RIGHT LOWER LOBE DUE TO INFECTIOUS ORGANISM: ICD-10-CM

## 2018-11-23 PROCEDURE — 71046 X-RAY EXAM CHEST 2 VIEWS: CPT | Mod: TC,FY,PO

## 2018-11-23 PROCEDURE — 71046 X-RAY EXAM CHEST 2 VIEWS: CPT | Mod: 26,,, | Performed by: RADIOLOGY

## 2018-11-27 ENCOUNTER — TELEPHONE (OUTPATIENT)
Dept: INTERNAL MEDICINE | Facility: CLINIC | Age: 78
End: 2018-11-27

## 2018-11-27 RX ORDER — GABAPENTIN 600 MG/1
TABLET ORAL
Qty: 360 TABLET | Refills: 3 | Status: SHIPPED | OUTPATIENT
Start: 2018-11-27 | End: 2019-12-26

## 2018-11-27 NOTE — TELEPHONE ENCOUNTER
----- Message from Liz Molina MA sent at 11/27/2018  9:46 AM CST -----  Contact: dimitrios-maria dolores      ----- Message -----  From: Gosia Perry  Sent: 11/27/2018   9:39 AM  To: Levar Rodríguez Jr Staff    Requesting a call back regarding results.Please call back at [phone].      Thanks,  Gosia Perry

## 2018-11-28 ENCOUNTER — OFFICE VISIT (OUTPATIENT)
Dept: PODIATRY | Facility: CLINIC | Age: 78
End: 2018-11-28
Payer: MEDICARE

## 2018-11-28 VITALS
SYSTOLIC BLOOD PRESSURE: 154 MMHG | TEMPERATURE: 98 F | DIASTOLIC BLOOD PRESSURE: 83 MMHG | HEIGHT: 66 IN | OXYGEN SATURATION: 82 % | HEART RATE: 82 BPM | BODY MASS INDEX: 40.25 KG/M2 | WEIGHT: 250.44 LBS

## 2018-11-28 DIAGNOSIS — B35.1 DERMATOPHYTOSIS OF NAIL: Primary | ICD-10-CM

## 2018-11-28 DIAGNOSIS — L84 PRE-ULCERATIVE CALLUSES: ICD-10-CM

## 2018-11-28 DIAGNOSIS — I73.9 PVD (PERIPHERAL VASCULAR DISEASE): ICD-10-CM

## 2018-11-28 DIAGNOSIS — E11.51 TYPE II DIABETES MELLITUS WITH PERIPHERAL CIRCULATORY DISORDER: ICD-10-CM

## 2018-11-28 PROCEDURE — 11056 PARNG/CUTG B9 HYPRKR LES 2-4: CPT | Mod: Q7,S$GLB,, | Performed by: PODIATRIST

## 2018-11-28 PROCEDURE — 11721 DEBRIDE NAIL 6 OR MORE: CPT | Mod: 59,Q7,S$GLB, | Performed by: PODIATRIST

## 2018-11-28 PROCEDURE — 99999 PR PBB SHADOW E&M-EST. PATIENT-LVL III: CPT | Mod: PBBFAC,,, | Performed by: PODIATRIST

## 2018-11-28 PROCEDURE — 99499 UNLISTED E&M SERVICE: CPT | Mod: S$GLB,,, | Performed by: PODIATRIST

## 2018-11-30 ENCOUNTER — HOSPITAL ENCOUNTER (OUTPATIENT)
Dept: RADIOLOGY | Facility: HOSPITAL | Age: 78
Discharge: HOME | End: 2018-11-30
Attending: PEDIATRICS
Payer: MEDICARE

## 2018-11-30 ENCOUNTER — OFFICE VISIT (OUTPATIENT)
Dept: INTERNAL MEDICINE | Facility: CLINIC | Age: 78
End: 2018-11-30
Payer: MEDICARE

## 2018-11-30 VITALS
OXYGEN SATURATION: 93 % | BODY MASS INDEX: 39.46 KG/M2 | DIASTOLIC BLOOD PRESSURE: 70 MMHG | HEIGHT: 66 IN | WEIGHT: 245.56 LBS | HEART RATE: 74 BPM | TEMPERATURE: 97 F | SYSTOLIC BLOOD PRESSURE: 138 MMHG

## 2018-11-30 DIAGNOSIS — I10 ESSENTIAL HYPERTENSION: ICD-10-CM

## 2018-11-30 DIAGNOSIS — J18.9 PNEUMONIA OF RIGHT MIDDLE LOBE DUE TO INFECTIOUS ORGANISM: ICD-10-CM

## 2018-11-30 DIAGNOSIS — E11.40 TYPE 2 DIABETES MELLITUS WITH DIABETIC NEUROPATHY, WITHOUT LONG-TERM CURRENT USE OF INSULIN: Primary | ICD-10-CM

## 2018-11-30 DIAGNOSIS — E11.69 HYPERLIPIDEMIA ASSOCIATED WITH TYPE 2 DIABETES MELLITUS: ICD-10-CM

## 2018-11-30 DIAGNOSIS — I48.21 PERMANENT ATRIAL FIBRILLATION: ICD-10-CM

## 2018-11-30 DIAGNOSIS — N18.30 CKD (CHRONIC KIDNEY DISEASE) STAGE 3, GFR 30-59 ML/MIN: ICD-10-CM

## 2018-11-30 DIAGNOSIS — M86.9 OSTEOMYELITIS OF TOE OF LEFT FOOT: ICD-10-CM

## 2018-11-30 DIAGNOSIS — E78.5 HYPERLIPIDEMIA ASSOCIATED WITH TYPE 2 DIABETES MELLITUS: ICD-10-CM

## 2018-11-30 PROCEDURE — 71046 X-RAY EXAM CHEST 2 VIEWS: CPT | Mod: TC,FY,PO

## 2018-11-30 PROCEDURE — 1101F PT FALLS ASSESS-DOCD LE1/YR: CPT | Mod: CPTII,S$GLB,, | Performed by: PEDIATRICS

## 2018-11-30 PROCEDURE — 99215 OFFICE O/P EST HI 40 MIN: CPT | Mod: S$GLB,,, | Performed by: PEDIATRICS

## 2018-11-30 PROCEDURE — 25500020 PHARM REV CODE 255: Mod: PO | Performed by: PEDIATRICS

## 2018-11-30 PROCEDURE — 3078F DIAST BP <80 MM HG: CPT | Mod: CPTII,S$GLB,, | Performed by: PEDIATRICS

## 2018-11-30 PROCEDURE — 71260 CT THORAX DX C+: CPT | Mod: TC,PO

## 2018-11-30 PROCEDURE — 71046 X-RAY EXAM CHEST 2 VIEWS: CPT | Mod: 26,,, | Performed by: RADIOLOGY

## 2018-11-30 PROCEDURE — 71260 CT THORAX DX C+: CPT | Mod: 26,,, | Performed by: RADIOLOGY

## 2018-11-30 PROCEDURE — 99999 PR PBB SHADOW E&M-EST. PATIENT-LVL IV: CPT | Mod: PBBFAC,,, | Performed by: PEDIATRICS

## 2018-11-30 PROCEDURE — 3075F SYST BP GE 130 - 139MM HG: CPT | Mod: CPTII,S$GLB,, | Performed by: PEDIATRICS

## 2018-11-30 RX ORDER — IMIQUIMOD 12.5 MG/.25G
CREAM TOPICAL
COMMUNITY
Start: 2018-11-02

## 2018-11-30 RX ORDER — FLUOROURACIL 50 MG/G
CREAM TOPICAL
COMMUNITY
Start: 2018-10-29

## 2018-11-30 RX ADMIN — IOHEXOL 75 ML: 350 INJECTION, SOLUTION INTRAVENOUS at 11:11

## 2018-11-30 NOTE — PROGRESS NOTES
Subjective:       Patient ID: Raymond Stuart is a 78 y.o. male.     Chief Complaint: 6mo/lab     HTN: B/P good, no HTNive symptoms.    DM: no hyper/hypoglycemic symptoms. Am self monitoring- sometimes BID BS run 100-150.  LIPIDS:not following D&E, tolerating and compliant with med(s).    PVD/cardio: followed by Vascular surgery and now seeing Dr Chance(11/17) through his [place] office.   DM foot ulcer: seeing Dr Castillo.     Recent pneumonia has clinically improved, less coughing and SOB, but still feels congested, no fever, but CXR showed continued abnormality    LABS REVIEWED AND DISCUSSED WITH PATIENT      Review of Systems   Constitutional: Negative for fever and unexpected weight change.   HENT: Negative for congestion and rhinorrhea.    Eyes: Negative for discharge and redness.   Respiratory: Negative for cough and wheezing.    Gastrointestinal: Negative for constipation, diarrhea and vomiting.   Genitourinary: Negative for decreased urine volume and difficulty urinating.   Musculoskeletal: Negative for arthralgias and joint swelling.   Skin: Negative for rash and wound except bruises easily due to coumadin.   Neurological: Negative for syncope and headaches.   Psychiatric/Behavioral: Negative for behavioral problems and sleep disturbance.      Objective:      Physical Exam   Constitutional: He is oriented to person, place, and time. He appears well-developed and well-nourished. No distress.   Neck: Normal range of motion. No JVD present. No thyromegaly present.   Cardiovascular: Normal heart sounds.    No murmur heard.  Rate controlled but irregular.   Pulmonary/Chest: Effort normal and breath sounds normal. No respiratory distress. He has no wheezes. He has no rales. BUT HE HAS DECREASED BS RIGHT POSTERIOR BASE.  Abdominal: Soft. He exhibits no distension and no mass. There is no tenderness. There is no rebound and no guarding. No hernia.   Musculoskeletal: He exhibits edema (trace).   Distal pulses not  palpable.   Lymphadenopathy:     He has no cervical adenopathy.   Neurological: He is alert and oriented to person, place, and time. No cranial nerve deficit. Coordination normal.   Skin: Capillary refill takes less than 2 seconds. No rash noted.   Psychiatric: He has a normal mood and affect. His behavior is normal. Judgment and thought content normal.     Foot hygiene was good, no active ulcers, s/p multiple amputations, no onychomycosis, no tinea, monofilament absent     Assessment:      1. Type 2 diabetes mellitus with diabetic neuropathy, without long-term current use of insulin    2. Essential hypertension    3. Hyperlipidemia associated with type 2 diabetes mellitus    4. Permanent atrial fibrillation    5. PVD (peripheral vascular disease)    6. Osteomyelitis of toe of left foot    7.    8.  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  Meds reviewed, Keep subspecialty care, self monitor B/P and BS. D&E as tolerated, weight loss. F/U 6 months with labs.

## 2018-12-04 ENCOUNTER — TELEPHONE (OUTPATIENT)
Dept: INTERNAL MEDICINE | Facility: CLINIC | Age: 78
End: 2018-12-04

## 2018-12-04 NOTE — TELEPHONE ENCOUNTER
S/w pt's dtr Yarely CACERES per Dr. Cristobal's review of cxr and CT results, referred pt to pulmonologist for specialist to review results and further tx, dtr voiced understanding, confirmed pt current stable status as reviewed and to call back PRN.

## 2018-12-04 NOTE — TELEPHONE ENCOUNTER
----- Message from Kanwal Barnett sent at 12/4/2018  2:55 PM CST -----  Contact: Yarely/daughter  Yarely called to speak with the nurse about the chest x-ray and the CT results. The patient received a call but he doesn't understand what he was told. She can be contacted at [phone].    Thanks,  Kanwal

## 2018-12-05 ENCOUNTER — HOSPITAL ENCOUNTER (OUTPATIENT)
Dept: RADIOLOGY | Facility: HOSPITAL | Age: 78
Discharge: HOME | End: 2018-12-05
Attending: NURSE PRACTITIONER
Payer: MEDICARE

## 2018-12-05 ENCOUNTER — OFFICE VISIT (OUTPATIENT)
Dept: URGENT CARE | Facility: CLINIC | Age: 78
End: 2018-12-05
Payer: MEDICARE

## 2018-12-05 VITALS
BODY MASS INDEX: 39.72 KG/M2 | OXYGEN SATURATION: 96 % | HEART RATE: 90 BPM | HEIGHT: 66 IN | DIASTOLIC BLOOD PRESSURE: 66 MMHG | RESPIRATION RATE: 12 BRPM | SYSTOLIC BLOOD PRESSURE: 148 MMHG | TEMPERATURE: 96 F | WEIGHT: 247.13 LBS

## 2018-12-05 DIAGNOSIS — R06.02 SOB (SHORTNESS OF BREATH): Primary | ICD-10-CM

## 2018-12-05 DIAGNOSIS — R06.02 SOB (SHORTNESS OF BREATH): ICD-10-CM

## 2018-12-05 DIAGNOSIS — R05.9 COUGH: ICD-10-CM

## 2018-12-05 PROCEDURE — 3077F SYST BP >= 140 MM HG: CPT | Mod: CPTII,S$GLB,, | Performed by: NURSE PRACTITIONER

## 2018-12-05 PROCEDURE — 99999 PR PBB SHADOW E&M-EST. PATIENT-LVL V: CPT | Mod: PBBFAC,,, | Performed by: NURSE PRACTITIONER

## 2018-12-05 PROCEDURE — 71046 X-RAY EXAM CHEST 2 VIEWS: CPT | Mod: TC,FY,PO

## 2018-12-05 PROCEDURE — 3078F DIAST BP <80 MM HG: CPT | Mod: CPTII,S$GLB,, | Performed by: NURSE PRACTITIONER

## 2018-12-05 PROCEDURE — 99214 OFFICE O/P EST MOD 30 MIN: CPT | Mod: 25,S$GLB,, | Performed by: NURSE PRACTITIONER

## 2018-12-05 PROCEDURE — 94640 AIRWAY INHALATION TREATMENT: CPT | Mod: S$GLB,,, | Performed by: NURSE PRACTITIONER

## 2018-12-05 PROCEDURE — 1101F PT FALLS ASSESS-DOCD LE1/YR: CPT | Mod: CPTII,S$GLB,, | Performed by: NURSE PRACTITIONER

## 2018-12-05 PROCEDURE — 71046 X-RAY EXAM CHEST 2 VIEWS: CPT | Mod: 26,,, | Performed by: RADIOLOGY

## 2018-12-05 RX ORDER — LEVALBUTEROL INHALATION SOLUTION 1.25 MG/3ML
1.25 SOLUTION RESPIRATORY (INHALATION)
Status: COMPLETED | OUTPATIENT
Start: 2018-12-05 | End: 2018-12-05

## 2018-12-05 RX ORDER — ALBUTEROL SULFATE 90 UG/1
1-2 AEROSOL, METERED RESPIRATORY (INHALATION) EVERY 4 HOURS PRN
Qty: 1 INHALER | Refills: 0 | Status: SHIPPED | OUTPATIENT
Start: 2018-12-05 | End: 2019-01-04

## 2018-12-05 RX ADMIN — LEVALBUTEROL INHALATION SOLUTION 1.25 MG: 1.25 SOLUTION RESPIRATORY (INHALATION) at 11:12

## 2018-12-05 NOTE — PROGRESS NOTES
Subjective:       Patient ID: Raymond Stuart is a 78 y.o. male.    Chief Complaint: Shortness of Breath    Pt is a 78 year old male to clinic today with complaints of a productive cough, congestion and SOB that has been intermittent times 3-4 weeks. [facility] and [facility] augmentin and doxy. Pt states completed abx as prescribed. Had f/u with PCP and CT shows possible unresolved pneumonia. Referral placed with pulm for this upcoming Tuesday 12/11. Pt states he is continuing to have productive cough and SOB. Denies fever.       Shortness of Breath   This is a recurrent problem. The current episode started 1 to 4 weeks ago. The problem occurs intermittently. The problem has been waxing and waning. Associated symptoms include rhinorrhea and sputum production. Pertinent negatives include no abdominal pain, chest pain, claudication, coryza, ear pain, fever, headaches, hemoptysis, neck pain, orthopnea, PND, rash, sore throat, swollen glands, syncope, vomiting or wheezing. Treatments tried: doxy, augmentin. The treatment provided mild relief. His past medical history is significant for pneumonia.     Review of Systems   Constitutional: Negative for chills, diaphoresis, fatigue and fever.   HENT: Positive for congestion, postnasal drip and rhinorrhea. Negative for ear discharge, ear pain, sinus pressure, sinus pain, sneezing, sore throat and trouble swallowing.    Respiratory: Positive for sputum production and shortness of breath. Negative for cough, hemoptysis, chest tightness and wheezing.    Cardiovascular: Negative for chest pain, palpitations, orthopnea, claudication, syncope and PND.   Gastrointestinal: Negative for abdominal pain, diarrhea, nausea and vomiting.   Musculoskeletal: Negative for back pain, myalgias and neck pain.   Skin: Negative for rash.   Neurological: Negative for dizziness, light-headedness and headaches.       Objective:      Physical Exam    Constitutional: He is oriented to person, place, and time. He appears well-developed and well-nourished. No distress.   HENT:   Head: Normocephalic.   Right Ear: Tympanic membrane, external ear and ear canal normal. No tenderness. Tympanic membrane is not bulging.   Left Ear: Tympanic membrane, external ear and ear canal normal. No tenderness. Tympanic membrane is not bulging.   Nose: Mucosal edema and rhinorrhea present. Right sinus exhibits no maxillary sinus tenderness and no frontal sinus tenderness. Left sinus exhibits no maxillary sinus tenderness and no frontal sinus tenderness.   Mouth/Throat: Uvula is midline, oropharynx is clear and moist and mucous membranes are normal. No oropharyngeal exudate, posterior oropharyngeal edema or posterior oropharyngeal erythema.   PND noted   Eyes: Conjunctivae and EOM are normal. Pupils are equal, round, and reactive to light.   Neck: Normal range of motion. Neck supple.   Cardiovascular: Normal rate, regular rhythm and normal heart sounds. Exam reveals no gallop and no friction rub.   No murmur heard.  Pulmonary/Chest: Effort normal and breath sounds normal. No accessory muscle usage or stridor. No apnea, no tachypnea and no bradypnea. No respiratory distress. He has no decreased breath sounds. He has no wheezes. He has no rhonchi. He has no rales.   Lymphadenopathy:        Head (right side): No submental, no submandibular and no tonsillar adenopathy present.        Head (left side): No submental, no submandibular and no tonsillar adenopathy present.     He has no cervical adenopathy.   Neurological: He is alert and oriented to person, place, and time.   Skin: Skin is warm and dry. No rash noted. He is not diaphoretic.   Psychiatric: He has a normal mood and affect. His speech is normal and behavior is normal. Thought content normal.   Nursing note and vitals reviewed.      Assessment:       1. SOB (shortness of breath)    2. Cough        Plan:   SOB (shortness of  breath)  -     X-Ray Chest PA And Lateral; Future; Expected date: 12/05/2018  -     levalbuterol nebulizer solution 1.25 mg  -     albuterol (PROVENTIL/VENTOLIN HFA) 90 mcg/actuation inhaler; Inhale 1-2 puffs into the lungs every 4 (four) hours as needed for Wheezing or Shortness of Breath (cough).  Dispense: 1 Inhaler; Refill: 0    Cough  -     levalbuterol nebulizer solution 1.25 mg  -     albuterol (PROVENTIL/VENTOLIN HFA) 90 mcg/actuation inhaler; Inhale 1-2 puffs into the lungs every 4 (four) hours as needed for Wheezing or Shortness of Breath (cough).  Dispense: 1 Inhaler; Refill: 0       Discussed xray results with pt.  Will prescribe albuterol pump for SOB.  Pt to f/u with pulm on 12/11.    Follow prescribed treatment plan as directed.  Stay hydrated and rest.  Report to ER if symptoms worsen.  Follow up with PCP in 2-3 days or sooner if symptoms do not improve.

## 2018-12-05 NOTE — PATIENT INSTRUCTIONS

## 2018-12-10 NOTE — PROGRESS NOTES
Subjective:     Patient ID: Raymond Stuart is a 78 y.o. male.    Chief Complaint: Nail Care (2mo)    Raymond is a 78 y.o. male who presents to the clinic for evaluation and treatment of high risk feet. Raymond has a past medical history of Diabetes mellitus, type 2, Hyperlipidemia, Hypertension, Irregular heartbeat, PVD (peripheral vascular disease), S/P femoral-popliteal bypass surgery (8/8/2014), and S/P peripheral artery angioplasty (8/8/2014). The patient's chief complaint is foot check and routine nail care.  This patient has documented high risk feet requiring routine maintenance secondary to peripheral vascular disease. Patient states he is scheduled to have another procedure with Dr. Judge on Friday.       PCP: JOHNATHON Cristobal Jr, MD    Date Last Seen by PCP: 5/30/18    Current shoe gear:  Affected Foot: Rx diabetic extra depth shoes and custom accommodative insoles     Unaffected Foot: Rx diabetic extra depth shoes and custom accommodative insoles    History of Trauma: negative  Sign of Infection: none    Hemoglobin A1C   Date Value Ref Range Status   11/23/2018 6.3 (H) 4.0 - 5.6 % Final     Comment:     ADA Screening Guidelines:  5.7-6.4%  Consistent with prediabetes  >or=6.5%  Consistent with diabetes  High levels of fetal hemoglobin interfere with the HbA1C  assay. Heterozygous hemoglobin variants (HbS, HgC, etc)do  not significantly interfere with this assay.   However, presence of multiple variants may affect accuracy.     05/23/2018 7.1 (H) 4.0 - 5.6 % Final     Comment:     According to ADA guidelines, hemoglobin A1c <7.0% represents  optimal control in non-pregnant diabetic patients. Different  metrics may apply to specific patient populations.   Standards of Medical Care in Diabetes-2016.  For the purpose of screening for the presence of diabetes:  <5.7%     Consistent with the absence of diabetes  5.7-6.4%  Consistent with increasing risk for diabetes   (prediabetes)  >or=6.5%  Consistent with  diabetes  Currently, no consensus exists for use of hemoglobin A1c  for diagnosis of diabetes for children.  This Hemoglobin A1c assay has significant interference with fetal   hemoglobin   (HbF). The results are invalid for patients with abnormal amounts of   HbF,   including those with known Hereditary Persistence   of Fetal Hemoglobin. Heterozygous hemoglobin variants (HbAS, HbAC,   HbAD, HbAE, HbA2) do not significantly interfere with this assay;   however, presence of multiple variants in a sample may impact the %   interference.     11/15/2017 6.8 (H) 4.0 - 5.6 % Final     Comment:     According to ADA guidelines, hemoglobin A1c <7.0% represents  optimal control in non-pregnant diabetic patients. Different  metrics may apply to specific patient populations.   Standards of Medical Care in Diabetes-2016.  For the purpose of screening for the presence of diabetes:  <5.7%     Consistent with the absence of diabetes  5.7-6.4%  Consistent with increasing risk for diabetes   (prediabetes)  >or=6.5%  Consistent with diabetes  Currently, no consensus exists for use of hemoglobin A1c  for diagnosis of diabetes for children.  This Hemoglobin A1c assay has significant interference with fetal   hemoglobin   (HbF). The results are invalid for patients with abnormal amounts of   HbF,   including those with known Hereditary Persistence   of Fetal Hemoglobin. Heterozygous hemoglobin variants (HbAS, HbAC,   HbAD, HbAE, HbA2) do not significantly interfere with this assay;   however, presence of multiple variants in a sample may impact the %   interference.             Patient Active Problem List   Diagnosis    Hyperlipidemia associated with type 2 diabetes mellitus    Essential hypertension    A-fib    PVD (peripheral vascular disease)    Special screening for malignant neoplasms, colon    S/P femoral-popliteal bypass surgery    S/P peripheral artery angioplasty    Type 2 diabetes mellitus with diabetic neuropathy     Osteomyelitis of toe of left foot    CKD (chronic kidney disease) stage 3, GFR 30-59 ml/min          Medication List           Accurate as of 11/28/18 11:59 PM. If you have any questions, ask your nurse or doctor.               CONTINUE taking these medications    amoxicillin 500 MG capsule  Commonly known as:  AMOXIL     atenolol 100 MG tablet  Commonly known as:  TENORMIN  TAKE 1 TABLET BY MOUTH ONCE DAILY     blood-glucose meter kit  To check BG 2 times daily, to use with insurance preferred meter     clopidogrel 75 mg tablet  Commonly known as:  PLAVIX  TAKE 1 TABLET BY MOUTH  DAILY     DAPTOmycin 500 mg injection  Commonly known as:  CUBICIN     doxycycline 100 MG Cap  Commonly known as:  VIBRAMYCIN     fenofibrate micronized 134 MG Cap  Commonly known as:  LOFIBRA  Take 1 capsule (134 mg total) by mouth once daily.     gabapentin 600 MG tablet  Commonly known as:  NEURONTIN  TAKE 2 TABLETS BY MOUTH TWO TIMES DAILY     glipizide-metformin 2.5-500 mg per tablet  Commonly known as:  METAGLIP  TAKE 2 TABLETS BY MOUTH  TWICE A DAY BEFORE MEALS IN THE MORNING AND EVENING     * lancets Misc  To check BG 2 times daily, to use with insurance preferred meter     * ONETOUCH DELICA LANCETS 30 gauge Misc  Generic drug:  lancets  USE TWO TIMES DAILY AND AS  NEEDED     lisinopril-hydrochlorothiazide 20-12.5 mg per tablet  Commonly known as:  PRINZIDE,ZESTORETIC  TAKE 2 TABLETS BY MOUTH  ONCE DAILY     ONETOUCH ULTRA BLUE TEST STRIP Strp  Generic drug:  blood sugar diagnostic  USE TWO TIMES DAILY AND AS  NEEDED     rosuvastatin 40 MG Tab  Commonly known as:  CRESTOR  TAKE 1 TABLET BY MOUTH ONCE DAILY     SANTYL ointment  Generic drug:  collagenase     warfarin 5 MG tablet  Commonly known as:  COUMADIN  TAKE 1 AND 1/2 TABLETS BY  MOUTH ON WEDNESDAY AND  SUNDAY AND 1 TABLET ALL  REMAINING DAYS AS DIRECTED  BY THE COUMADIN CLINIC.         * This list has 2 medication(s) that are the same as other medications prescribed for you.  "Read the directions carefully, and ask your doctor or other care provider to review them with you.                Review of patient's allergies indicates:   Allergen Reactions    Sulfa (sulfonamide antibiotics)      Other reaction(s): Stomach upset    Sulfamethoxazole Rash    Trimethoprim Rash     Bactrim         Past Surgical History:   Procedure Laterality Date    AMPUTATION- TOE Left 2017    [facility]formed [facility]    CARDIAC ELECTROPHYSIOLOGY MAPPING AND ABLATION      COLONOSCOPY N/A 2014    Performed by Yinka Clifford [facility] at [facility] ENDO    POPLITEAL ARTERY STENT  2013    RECTOPERITONEAL FISTULA CLOSURE      TOE SURGERY      Joint release    TONSILLECTOMY         History reviewed. No pertinent family history.    Social History     Socioeconomic History    Marital status:      Spouse name: Not on file    Number of children: Not on file    Years of education: Not on file    Highest education level: Not on file   Social Needs    Financial resource strain: Not on file    Food insecurity - worry: Not on file    Food insecurity - inability: Not on file    Transportation needs - medical: Not on file    Transportation needs - non-medical: Not on file   Occupational History    Occupation: Retired      Comment: [employer]   Tobacco Use    Smoking status: Former Smoker     Packs/day: 4.00     Years: 30.00     Pack years: 120.00     Types: Cigarettes     Last attempt to quit: 1994     Years since quittin.9    Smokeless tobacco: Never Used   Substance and Sexual Activity    Alcohol use: No    Drug use: No    Sexual activity: Not on file   Other Topics Concern    Not on file   Social History Narrative    Not on file       Vitals:    18 1417   BP: (!) 154/83   Pulse: 82   Temp: 98.1 °F (36.7 °C)   SpO2: (!) 82%   Weight: 113.6 kg (250 lb 7.1 oz)   Height: 5' 6" (1.676 m)       Hemoglobin A1C   Date Value Ref Range Status   2018 6.3 (H) 4.0 - " 5.6 % Final     Comment:     ADA Screening Guidelines:  5.7-6.4%  Consistent with prediabetes  >or=6.5%  Consistent with diabetes  High levels of fetal hemoglobin interfere with the HbA1C  assay. Heterozygous hemoglobin variants (HbS, HgC, etc)do  not significantly interfere with this assay.   However, presence of multiple variants may affect accuracy.     05/23/2018 7.1 (H) 4.0 - 5.6 % Final     Comment:     According to ADA guidelines, hemoglobin A1c <7.0% represents  optimal control in non-pregnant diabetic patients. Different  metrics may apply to specific patient populations.   Standards of Medical Care in Diabetes-2016.  For the purpose of screening for the presence of diabetes:  <5.7%     Consistent with the absence of diabetes  5.7-6.4%  Consistent with increasing risk for diabetes   (prediabetes)  >or=6.5%  Consistent with diabetes  Currently, no consensus exists for use of hemoglobin A1c  for diagnosis of diabetes for children.  This Hemoglobin A1c assay has significant interference with fetal   hemoglobin   (HbF). The results are invalid for patients with abnormal amounts of   HbF,   including those with known Hereditary Persistence   of Fetal Hemoglobin. Heterozygous hemoglobin variants (HbAS, HbAC,   HbAD, HbAE, HbA2) do not significantly interfere with this assay;   however, presence of multiple variants in a sample may impact the %   interference.     11/15/2017 6.8 (H) 4.0 - 5.6 % Final     Comment:     According to ADA guidelines, hemoglobin A1c <7.0% represents  optimal control in non-pregnant diabetic patients. Different  metrics may apply to specific patient populations.   Standards of Medical Care in Diabetes-2016.  For the purpose of screening for the presence of diabetes:  <5.7%     Consistent with the absence of diabetes  5.7-6.4%  Consistent with increasing risk for diabetes   (prediabetes)  >or=6.5%  Consistent with diabetes  Currently, no consensus exists for use of hemoglobin A1c  for  diagnosis of diabetes for children.  This Hemoglobin A1c assay has significant interference with fetal   hemoglobin   (HbF). The results are invalid for patients with abnormal amounts of   HbF,   including those with known Hereditary Persistence   of Fetal Hemoglobin. Heterozygous hemoglobin variants (HbAS, HbAC,   HbAD, HbAE, HbA2) do not significantly interfere with this assay;   however, presence of multiple variants in a sample may impact the %   interference.         Review of Systems   Constitutional: Negative for chills and fever.   Respiratory: Negative for shortness of breath.    Cardiovascular: Negative for chest pain, palpitations, orthopnea, claudication and leg swelling.   Gastrointestinal: Negative for diarrhea, nausea and vomiting.   Musculoskeletal: Negative for joint pain.   Skin: Negative for rash.   Neurological: Positive for sensory change. Negative for dizziness, tingling, focal weakness and weakness.   Psychiatric/Behavioral: Negative.          Objective:      PHYSICAL EXAM: Apperance: Alert and orient in no distress,well developed, and with good attention to grooming and body habits  Patient presents ambulating in diabetic shoes and inserts.  Lower Extremity Exam  VASCULAR: Dorsalis pedis pulses 1/4 bilateral and Posterior Tibial pulses 1/4 bilateral. Capillary fill time <4 seconds bilateral. No edema observed bilateral. Varicosities present bilateral. Skin temperature of the lower extremities is warm to warm, proximal to distal. Hair growth absent bilateral. (--) lymphangitis or (--) cellulitis noted bilateral.  DERMATOLOGICAL: (--) edema, (--) erythema, (--) malodor, (-) drainage, (--) warmth to left foot. Left hallux nail absent. Nails 2,3,4 left and 2,3,4,5 right elongated, thickened, and discolored with subungual debris. The dorsum surface of the feet are soft and supple.  The plantar aspects of both feet are dry and scaly. Webspaces 1,2,3,4 clean, dry and without evidence of break in  skin integrity bilateral. Thin hyperkeratotic tissue noted to right partial hallux and plantar left 5th submetatarsal.   NEUROLOGICAL: Light touch, sharp-dull, proprioception all present and equal bilaterally.    MUSCULOSKELETAL: Muscle strength is 5/5 for foot inverters, everters, plantarflexors, and dorsiflexors. Muscle tone is normal.  Inspection/palpation of bone, joints and muscles unremarkable with the exception of that noted above. 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            Assessment:       No diagnosis found.      Plan:   There are no diagnoses linked to this encounter.  I counseled the patient on his conditions, regarding findings of my examination, my impressions, and usual treatment plan.   Greater than 50% of this visit spent on counseling and coordination of care.  Greater than 20 minutes spent on education about the PVD, and prevention of limb loss.  Shoe inspection. Patient instructed on proper foot hygeine. We discussed wearing proper shoe gear, daily foot inspections, never walking without protective shoe gear, never putting sharp instruments to feet.    With patient's permission, nails as noted above bilateral were trimmed in length and thickness aggressively to their soft tissue attachment mechanically and with electric , removing all offending nail and debris. Patient relates relief following the procedure.  With patient's permission, bilateral callus trimmed in thickness with #15 blade in thickness without incident.   Patient  will continue to monitor the areas daily, inspect feet, wear protective shoe gear when ambulatory, moisturizer to maintain skin integrity.   Patient to return 2 months or sooner if needed.                  Nic Castillo, THERON  [facility]

## 2018-12-11 ENCOUNTER — LAB VISIT (OUTPATIENT)
Dept: LAB | Facility: HOSPITAL | Age: 78
End: 2018-12-11
Attending: INTERNAL MEDICINE
Payer: MEDICARE

## 2018-12-11 ENCOUNTER — OFFICE VISIT (OUTPATIENT)
Dept: PULMONOLOGY | Facility: CLINIC | Age: 78
End: 2018-12-11
Payer: MEDICARE

## 2018-12-11 VITALS
BODY MASS INDEX: 40.25 KG/M2 | DIASTOLIC BLOOD PRESSURE: 76 MMHG | WEIGHT: 250.44 LBS | HEIGHT: 66 IN | SYSTOLIC BLOOD PRESSURE: 120 MMHG | RESPIRATION RATE: 17 BRPM | HEART RATE: 105 BPM | OXYGEN SATURATION: 94 %

## 2018-12-11 DIAGNOSIS — R60.0 BILATERAL LOWER EXTREMITY EDEMA: ICD-10-CM

## 2018-12-11 DIAGNOSIS — J90 PLEURAL EFFUSION ON RIGHT: Primary | ICD-10-CM

## 2018-12-11 DIAGNOSIS — Z87.891 FORMER VERY HEAVY CIGARETTE SMOKER (MORE THAN 40 PER DAY): ICD-10-CM

## 2018-12-11 DIAGNOSIS — J90 PLEURAL EFFUSION ON RIGHT: ICD-10-CM

## 2018-12-11 LAB
BNP SERPL-MCNC: 234 PG/ML
INR PPP: 2.3
PROTHROMBIN TIME: 23.7 SEC

## 2018-12-11 PROCEDURE — 36415 COLL VENOUS BLD VENIPUNCTURE: CPT | Mod: PO

## 2018-12-11 PROCEDURE — 99204 OFFICE O/P NEW MOD 45 MIN: CPT | Mod: S$GLB,,, | Performed by: INTERNAL MEDICINE

## 2018-12-11 PROCEDURE — 1101F PT FALLS ASSESS-DOCD LE1/YR: CPT | Mod: CPTII,S$GLB,, | Performed by: INTERNAL MEDICINE

## 2018-12-11 PROCEDURE — 99999 PR PBB SHADOW E&M-EST. PATIENT-LVL III: CPT | Mod: PBBFAC,,, | Performed by: INTERNAL MEDICINE

## 2018-12-11 PROCEDURE — 3078F DIAST BP <80 MM HG: CPT | Mod: CPTII,S$GLB,, | Performed by: INTERNAL MEDICINE

## 2018-12-11 PROCEDURE — 83880 ASSAY OF NATRIURETIC PEPTIDE: CPT

## 2018-12-11 PROCEDURE — 3074F SYST BP LT 130 MM HG: CPT | Mod: CPTII,S$GLB,, | Performed by: INTERNAL MEDICINE

## 2018-12-11 PROCEDURE — 85610 PROTHROMBIN TIME: CPT | Mod: PO

## 2018-12-11 RX ORDER — LIDOCAINE HYDROCHLORIDE 10 MG/ML
1 INJECTION, SOLUTION EPIDURAL; INFILTRATION; INTRACAUDAL; PERINEURAL ONCE
Status: CANCELLED | OUTPATIENT
Start: 2018-12-11 | End: 2018-12-11

## 2018-12-11 NOTE — LETTER
December 11, 2018      DELIA Cristobal Jr., MD  [address] [place] [address]           [facility]  [address] [place] [address]  Phone: [phone]  Fax: [phone]          Patient: Raymond Stuart   MR Number: [id number]   YOB: 1940   Date of Visit: 12/11/2018       Dear Dr. DELIA Cristobal Jr.:    Thank you for referring Raymond Stuart to me for evaluation. Attached you will find relevant portions of my assessment and plan of care.    If you have questions, please do not hesitate to call me. I look forward to following Raymond Stuart along with you.    Sincerely,    Princess Schaffer MD    Enclosure  CC:  No Recipients    If you would like to receive this communication electronically, please contact [email or web address] or [phone] to request more information on [employer] access.    For providers and/or their staff who would like to refer a patient to [facility], please contact us through our one-stop-shop provider referral line, [facility], at [phone].    If you feel you have received this communication in error or would no longer like to receive these types of communications, please e-mail [email or web address]

## 2018-12-11 NOTE — H&P (VIEW-ONLY)
Initial Outpatient Pulmonary Evaluation       SUBJECTIVE:     History of Present Illness:  Patient is a 78 y.o. male referred for evaluation of abnormal chest x-ray.    Patient has been complaining for few weeks of shortness of breath mainly on exertion, rattling in his chest, clear sputum production and coughing.    Chest x-ray performed showed right-sided pleural effusion.    Patient known with history of AFib on Coumadin, he also has peripheral vascular disease on Plavix stent was placed in lower extremity in September as per patient daughter.    He has 120 pack year smoking history quit in 1994.    He worked in construction and as a .        Chief Complaint   Patient presents with    Pneumonia       Review of patient's allergies indicates:   Allergen Reactions    Sulfamethoxazole-trimethoprim Hives    Sulfa (sulfonamide antibiotics)      Other reaction(s): Stomach upset    Bacitracin Rash    Sulfamethoxazole Rash    Trimethoprim Rash     Bactrim         Current Outpatient Medications   Medication Sig Dispense Refill    albuterol (PROVENTIL/VENTOLIN HFA) 90 mcg/actuation inhaler Inhale 1-2 puffs into the lungs every 4 (four) hours as needed for Wheezing or Shortness of Breath (cough). 1 Inhaler 0    atenolol (TENORMIN) 100 MG tablet TAKE 1 TABLET BY MOUTH ONCE DAILY 90 tablet 3    blood-glucose meter kit To check BG 2 times daily, to use with insurance preferred meter 1 each 0    clopidogrel (PLAVIX) 75 mg tablet TAKE 1 TABLET BY MOUTH  DAILY 90 tablet 3    daptomycin (CUBICIN) injection       fenofibrate micronized (LOFIBRA) 134 MG Cap Take 1 capsule (134 mg total) by mouth once daily. 90 capsule 3    fluorouracil (EFUDEX) 5 % cream       gabapentin (NEURONTIN) 600 MG tablet TAKE 2 TABLETS BY MOUTH TWO TIMES DAILY 360 tablet 3    glipizide-metformin (METAGLIP) 2.5-500 mg per tablet TAKE 2 TABLETS BY MOUTH  TWICE A DAY BEFORE MEALS IN THE MORNING  AND EVENING 360 tablet 3    imiquimod (ALDARA) 5 % cream       lancets Misc To check BG 2 times daily, to use with insurance preferred meter 100 each 3    lisinopril-hydrochlorothiazide (PRINZIDE,ZESTORETIC) 20-12.5 mg per tablet TAKE 2 TABLETS BY MOUTH  ONCE DAILY 180 tablet 3    ONETOUCH DELICA LANCETS 30 gauge Misc USE TWO TIMES DAILY AND AS  NEEDED 300 each 3    ONETOUCH ULTRA BLUE TEST STRIP Strp USE TWO TIMES DAILY AND AS  NEEDED 300 strip 3    rosuvastatin (CRESTOR) 40 MG Tab TAKE 1 TABLET BY MOUTH ONCE DAILY 90 tablet 3    SANTYL ointment       warfarin (COUMADIN) 5 MG tablet TAKE 1 AND 1/2 TABLETS BY  MOUTH ON WEDNESDAY AND   AND 1 TABLET ALL  REMAINING DAYS AS DIRECTED  BY THE COUMADIN CLINIC. 96 tablet 3     No current facility-administered medications for this visit.        Past Medical History:   Diagnosis Date    Diabetes mellitus, type 2     Hyperlipidemia     Hypertension     Irregular heartbeat     PVD (peripheral vascular disease)     S/P femoral-popliteal bypass surgery 2014    S/P peripheral artery angioplasty 2014     Past Surgical History:   Procedure Laterality Date    AMPUTATION- TOE Left 2017    Performed by [facility] at [facility] OR    CARDIAC ELECTROPHYSIOLOGY MAPPING AND ABLATION      COLONOSCOPY N/A 2014    Performed by Yinka Clifofrd MD at [facility] ENDO    POPLITEAL ARTERY STENT  2013    RECTOPERITONEAL FISTULA CLOSURE      TOE SURGERY      Joint release    TONSILLECTOMY       History reviewed. No pertinent family history.  Social History     Tobacco Use    Smoking status: Former Smoker     Packs/day: 4.00     Years: 30.00     Pack years: 120.00     Types: Cigarettes     Last attempt to quit: 1994     Years since quittin.9    Smokeless tobacco: Never Used   Substance Use Topics    Alcohol use: No    Drug use: No        Review of Systems:  Review of Systems   Constitutional: Negative for chills and fever.   HENT: Negative for  "hearing loss and nosebleeds.    Eyes: Negative for discharge and redness.   Respiratory: Positive for cough and shortness of breath.    Cardiovascular: Positive for leg swelling. Negative for palpitations.        AFib   Gastrointestinal: Negative for blood in stool.   Endocrine: Negative for cold intolerance.        Diabetes mellitus   Genitourinary: Negative for hematuria.   Musculoskeletal: Positive for arthralgias. Negative for gait problem.   Skin: Negative for wound.   Allergic/Immunologic: Negative for immunocompromised state.   Neurological: Negative for seizures and syncope.   Hematological: Negative for adenopathy.   Psychiatric/Behavioral: Negative for behavioral problems. The patient is not nervous/anxious.        OBJECTIVE:     Vital Signs (Most Recent)  Pulse: 105 (12/11/18 1412)  Resp: 17 (12/11/18 1412)  BP: 120/76 (12/11/18 1412)  SpO2: (!) 94 % (12/11/18 1555)  5' 6" (1.676 m)  113.6 kg (250 lb 7.1 oz)     Physical Exam:  Physical Exam   Constitutional: He is oriented to person, place, and time. He appears well-developed and well-nourished. No distress.   HENT:   Head: Normocephalic and atraumatic.   Eyes: EOM are normal.   Neck: Neck supple.   Cardiovascular:   Irregular heartbeats tachycardic at 1:05 a.m.   Pulmonary/Chest: Effort normal. No respiratory distress. He has rales.   Decreased breath sounds at right base   Abdominal: Soft. There is no tenderness.   Musculoskeletal: He exhibits edema.   +1  pitting edema bilaterally   Neurological: He is alert and oriented to person, place, and time.   Skin: Skin is warm.   Psychiatric: He has a normal mood and affect. His behavior is normal. Judgment and thought content normal.       Laboratory    Lab Results   Component Value Date    WBC 5.63 03/24/2017    HGB 9.8 (L) 03/24/2017    HCT 31.5 (L) 03/24/2017    MCV 90 03/24/2017     (L) 03/24/2017     BMP  Lab Results   Component Value Date     11/23/2018    K 4.5 11/23/2018     " 11/23/2018    CO2 33 (H) 11/23/2018    BUN 23 11/23/2018    CREATININE 1.4 11/23/2018    CALCIUM 10.1 11/23/2018    ANIONGAP 8 11/23/2018    ESTGFRAFRICA 56.0 (A) 11/23/2018    EGFRNONAA 48.5 (A) 11/23/2018     BNP  @LABRCNTIP(BNP,BNPTRIAGEBLO)@  No results found for: TSH  ABG  @LABRCNTIP(PH,PO2,PCO2,HCO3,BE)@    Diagnostic Results:  Chest x-ray personally December 2018 reviewed right-sided moderate pleural effusion noted. Not present in October 2018.    CT chest personally reviewed showed moderate right-sided pleural effusion with associated atelectasis.        Nuclear stress test 2016    Impression: NORMAL MYOCARDIAL PERFUSION  1. The perfusion scan is free of evidence for myocardial ischemia or injury.   2. There is a mild intensity fixed defect in the inferior wall of the left ventricle, secondary to diaphragm attenuation.   3. Resting wall motion is physiologic.   4. Resting LV function is normal.   5. The ventricular volumes are normal at rest and stress.   6. The extracardiac distribution of radioactivity is normal.     ASSESSMENT/PLAN:     Pleural effusion on right  -     B-TYPE NATRIURETIC PEPTIDE; Future; Expected date: 12/11/2018  -     Protime-INR; Future; Expected date: 12/20/2018  -     Case request GI: THORACENTESIS  -     Insert saline lock; Standing  -     Full code; Standing  -     Place in Outpatient; Standing  -     Culture, Respiratory with Gram Stain; Standing  -     Gram stain; Standing  -     AFB culture (includes stain); Standing  -     Protein, Peritoneal, Pleural Fluid or [place] Drainage, In-House Thoracentesis Fluid; Standing  -     Albumin, Peritoneal, Pleural Fluid or [place] Drainage, In-House Thoracentesis Fluid; Standing  -     LDH, Peritoneal, Pleural Fluid or [place] Drainage, In-House Thoracentesis Fluid; Standing  -     Glucose, Peritoneal, Pleural Fluid or [place] Drainage, In-House Thoracentesis Fluid; Standing  -     WBC & Diff,Body Fluid Thoracentesis Fluid; Standing  -     Cytology Specimen -  Please Refrigerate; Standing  -     US Chest Mediastinum; Standing  -     Insert peripheral IV; Standing    Bilateral lower extremity edema    Former very heavy cigarette smoker (more than 40 per day)    Other orders  -     lidocaine (PF) 10 mg/ml (1%) injection 10 mg  -     lidocaine (PF) 10 mg/ml (1%) injection 10 mg      Right pleural effusion most likely related to CHF in the presence of lower extremity edema however need to rule out malignancy.    Will perform right-sided thoracentesis and send fluid for chemistries cytology and microbiology    Instructed the patient to stop using Coumadin 2 days prior to the procedure Thursday the 20th of December and the Plavix 5 days prior to the procedure.        Follow-up in about 2 months (around 2/11/2019).    This note was prepared using voice recognition system and is likely to have sound alike errors that may have been overlooked even after proof reading.  Please call me with any questions    Discussed diagnosis, its evaluation, treatment and usual course. All questions answered.    Thank you for the courtesy of participating in the care of this patient    Prnicess Schaffer MD

## 2018-12-11 NOTE — PROGRESS NOTES
Initial Outpatient Pulmonary Evaluation       SUBJECTIVE:     History of Present Illness:  Patient is a 78 y.o. male referred for evaluation of abnormal chest x-ray.    Patient has been complaining for few weeks of shortness of breath mainly on exertion, rattling in his chest, clear sputum production and coughing.    Chest x-ray performed showed right-sided pleural effusion.    Patient known with history of AFib on Coumadin, he also has peripheral vascular disease on Plavix stent was placed in lower extremity in September as per patient daughter.    He has 120 pack year smoking history quit in 1994.    He worked in construction and as a .        Chief Complaint   Patient presents with    Pneumonia       Review of patient's allergies indicates:   Allergen Reactions    Sulfamethoxazole-trimethoprim Hives    Sulfa (sulfonamide antibiotics)      Other reaction(s): Stomach upset    Bacitracin Rash    Sulfamethoxazole Rash    Trimethoprim Rash     Bactrim         Current Outpatient Medications   Medication Sig Dispense Refill    albuterol (PROVENTIL/VENTOLIN HFA) 90 mcg/actuation inhaler Inhale 1-2 puffs into the lungs every 4 (four) hours as needed for Wheezing or Shortness of Breath (cough). 1 Inhaler 0    atenolol (TENORMIN) 100 MG tablet TAKE 1 TABLET BY MOUTH ONCE DAILY 90 tablet 3    blood-glucose meter kit To check BG 2 times daily, to use with insurance preferred meter 1 each 0    clopidogrel (PLAVIX) 75 mg tablet TAKE 1 TABLET BY MOUTH  DAILY 90 tablet 3    daptomycin (CUBICIN) injection       fenofibrate micronized (LOFIBRA) 134 MG Cap Take 1 capsule (134 mg total) by mouth once daily. 90 capsule 3    fluorouracil (EFUDEX) 5 % cream       gabapentin (NEURONTIN) 600 MG tablet TAKE 2 TABLETS BY MOUTH TWO TIMES DAILY 360 tablet 3    glipizide-metformin (METAGLIP) 2.5-500 mg per tablet TAKE 2 TABLETS BY MOUTH  TWICE A DAY BEFORE MEALS IN THE MORNING  AND EVENING 360 tablet 3    imiquimod (ALDARA) 5 % cream       lancets Misc To check BG 2 times daily, to use with insurance preferred meter 100 each 3    lisinopril-hydrochlorothiazide (PRINZIDE,ZESTORETIC) 20-12.5 mg per tablet TAKE 2 TABLETS BY MOUTH  ONCE DAILY 180 tablet 3    ONETOUCH DELICA LANCETS 30 gauge Misc USE TWO TIMES DAILY AND AS  NEEDED 300 each 3    ONETOUCH ULTRA BLUE TEST STRIP Strp USE TWO TIMES DAILY AND AS  NEEDED 300 strip 3    rosuvastatin (CRESTOR) 40 MG Tab TAKE 1 TABLET BY MOUTH ONCE DAILY 90 tablet 3    SANTYL ointment       warfarin (COUMADIN) 5 MG tablet TAKE 1 AND 1/2 TABLETS BY  MOUTH ON WEDNESDAY AND   AND 1 TABLET ALL  REMAINING DAYS AS DIRECTED  BY THE COUMADIN CLINIC. 96 tablet 3     No current facility-administered medications for this visit.        Past Medical History:   Diagnosis Date    Diabetes mellitus, type 2     Hyperlipidemia     Hypertension     Irregular heartbeat     PVD (peripheral vascular disease)     S/P femoral-popliteal bypass surgery 2014    S/P peripheral artery angioplasty 2014     Past Surgical History:   Procedure Laterality Date    AMPUTATION- TOE Left 2017    [facility]    CARDIAC ELECTROPHYSIOLOGY MAPPING AND ABLATION      COLONOSCOPY N/A 2014    [facility]a Clifford [facility]    POPLITEAL ARTERY STENT  2013    RECTOPERITONEAL FISTULA CLOSURE      TOE SURGERY      Joint release    TONSILLECTOMY       History reviewed. No pertinent family history.  Social History     Tobacco Use    Smoking status: Former Smoker     Packs/day: 4.00     Years: 30.00     Pack years: 120.00     Types: Cigarettes     Last attempt to quit: 1994     Years since quittin.9    Smokeless tobacco: Never Used   Substance Use Topics    Alcohol use: No    Drug use: No        Review of Systems:  Review of Systems   Constitutional: Negative for chills and fever.   HENT: Negative for  "hearing loss and nosebleeds.    Eyes: Negative for discharge and redness.   Respiratory: Positive for cough and shortness of breath.    Cardiovascular: Positive for leg swelling. Negative for palpitations.        AFib   Gastrointestinal: Negative for blood in stool.   Endocrine: Negative for cold intolerance.        Diabetes mellitus   Genitourinary: Negative for hematuria.   Musculoskeletal: Positive for arthralgias. Negative for gait problem.   Skin: Negative for wound.   Allergic/Immunologic: Negative for immunocompromised state.   Neurological: Negative for seizures and syncope.   Hematological: Negative for adenopathy.   Psychiatric/Behavioral: Negative for behavioral problems. The patient is not nervous/anxious.        OBJECTIVE:     Vital Signs (Most Recent)  Pulse: 105 (12/11/18 1412)  Resp: 17 (12/11/18 1412)  BP: 120/76 (12/11/18 1412)  SpO2: (!) 94 % (12/11/18 1555)  5' 6" (1.676 m)  113.6 kg (250 lb 7.1 oz)     Physical Exam:  Physical Exam   Constitutional: He is oriented to person, place, and time. He appears well-developed and well-nourished. No distress.   HENT:   Head: Normocephalic and atraumatic.   Eyes: EOM are normal.   Neck: Neck supple.   Cardiovascular:   Irregular heartbeats tachycardic at 1:05 a.m.   Pulmonary/Chest: Effort normal. No respiratory distress. He has rales.   Decreased breath sounds at right base   Abdominal: Soft. There is no tenderness.   Musculoskeletal: He exhibits edema.   +1  pitting edema bilaterally   Neurological: He is alert and oriented to person, place, and time.   Skin: Skin is warm.   Psychiatric: He has a normal mood and affect. His behavior is normal. Judgment and thought content normal.       Laboratory    Lab Results   Component Value Date    WBC 5.63 03/24/2017    HGB 9.8 (L) 03/24/2017    HCT 31.5 (L) 03/24/2017    MCV 90 03/24/2017     (L) 03/24/2017     BMP  Lab Results   Component Value Date     11/23/2018    K 4.5 11/23/2018     " 11/23/2018    CO2 33 (H) 11/23/2018    BUN 23 11/23/2018    CREATININE 1.4 11/23/2018    CALCIUM 10.1 11/23/2018    ANIONGAP 8 11/23/2018    ESTGFRAFRICA 56.0 (A) 11/23/2018    EGFRNONAA 48.5 (A) 11/23/2018     BNP  @LABRCNTIP(BNP,BNPTRIAGEBLO)@  No results found for: TSH  ABG  @LABRCNTIP(PH,PO2,PCO2,HCO3,BE)@    Diagnostic Results:  Chest x-ray personally December 2018 reviewed right-sided moderate pleural effusion noted. Not present in October 2018.    CT chest personally reviewed showed moderate right-sided pleural effusion with associated atelectasis.        Nuclear stress test 2016    Impression: NORMAL MYOCARDIAL PERFUSION  1. The perfusion scan is free of evidence for myocardial ischemia or injury.   2. There is a mild intensity fixed defect in the inferior wall of the left ventricle, secondary to diaphragm attenuation.   3. Resting wall motion is physiologic.   4. Resting LV function is normal.   5. The ventricular volumes are normal at rest and stress.   6. The extracardiac distribution of radioactivity is normal.     ASSESSMENT/PLAN:     Pleural effusion on right  -     B-TYPE NATRIURETIC PEPTIDE; Future; Expected date: 12/11/2018  -     Protime-INR; Future; Expected date: 12/20/2018  -     Case request GI: THORACENTESIS  -     Insert saline lock; Standing  -     Full code; Standing  -     Place in Outpatient; Standing  -     Culture, Respiratory with Gram Stain; Standing  -     Gram stain; Standing  -     AFB culture (includes stain); Standing  -     Protein, Peritoneal, Pleural Fluid or [place] Drainage, In-House Thoracentesis Fluid; Standing  -     Albumin, Peritoneal, Pleural Fluid or [place] Drainage, In-House Thoracentesis Fluid; Standing  -     LDH, Peritoneal, Pleural Fluid or [place] Drainage, In-House Thoracentesis Fluid; Standing  -     Glucose, Peritoneal, Pleural Fluid or [place] Drainage, In-House Thoracentesis Fluid; Standing  -     WBC & Diff,Body Fluid Thoracentesis Fluid; Standing  -     Cytology Specimen -  Please Refrigerate; Standing  -     US Chest Mediastinum; Standing  -     Insert peripheral IV; Standing    Bilateral lower extremity edema    Former very heavy cigarette smoker (more than 40 per day)    Other orders  -     lidocaine (PF) 10 mg/ml (1%) injection 10 mg  -     lidocaine (PF) 10 mg/ml (1%) injection 10 mg      Right pleural effusion most likely related to CHF in the presence of lower extremity edema however need to rule out malignancy.    Will perform right-sided thoracentesis and send fluid for chemistries cytology and microbiology    Instructed the patient to stop using Coumadin 2 days prior to the procedure Thursday the 20th of December and the Plavix 5 days prior to the procedure.        Follow-up in about 2 months (around 2/11/2019).    This note was prepared using voice recognition system and is likely to have sound alike errors that may have been overlooked even after proof reading.  Please call me with any questions    Discussed diagnosis, its evaluation, treatment and usual course. All questions answered.    Thank you for the courtesy of participating in the care of this patient    Princess Schaffer MD

## 2018-12-12 ENCOUNTER — PATIENT MESSAGE (OUTPATIENT)
Dept: PULMONOLOGY | Facility: CLINIC | Age: 78
End: 2018-12-12

## 2018-12-12 ENCOUNTER — HOSPITAL ENCOUNTER (INPATIENT)
Facility: HOSPITAL | Age: 78
LOS: 1 days | Discharge: HOME | DRG: 291 | End: 2018-12-14
Attending: FAMILY MEDICINE | Admitting: INTERNAL MEDICINE
Payer: MEDICARE

## 2018-12-12 ENCOUNTER — TELEPHONE (OUTPATIENT)
Dept: PULMONOLOGY | Facility: CLINIC | Age: 78
End: 2018-12-12

## 2018-12-12 DIAGNOSIS — I50.9 CHF (CONGESTIVE HEART FAILURE): ICD-10-CM

## 2018-12-12 DIAGNOSIS — I50.33 DIASTOLIC CHF, ACUTE ON CHRONIC: ICD-10-CM

## 2018-12-12 DIAGNOSIS — J96.20 RESPIRATORY FAILURE, ACUTE AND CHRONIC: ICD-10-CM

## 2018-12-12 DIAGNOSIS — R06.02 SHORTNESS OF BREATH: Primary | ICD-10-CM

## 2018-12-12 DIAGNOSIS — J44.1 COPD EXACERBATION: ICD-10-CM

## 2018-12-12 PROBLEM — D69.6 THROMBOCYTOPENIA: Status: ACTIVE | Noted: 2018-12-12

## 2018-12-12 PROBLEM — J90 PLEURAL EFFUSION ON RIGHT: Status: ACTIVE | Noted: 2018-12-12

## 2018-12-12 LAB
ALBUMIN SERPL BCP-MCNC: 4 G/DL
ALP SERPL-CCNC: 51 U/L
ALT SERPL W/O P-5'-P-CCNC: 23 U/L
ANION GAP SERPL CALC-SCNC: 9 MMOL/L
ANISOCYTOSIS BLD QL SMEAR: SLIGHT
AST SERPL-CCNC: 24 U/L
BASOPHILS NFR BLD: 0 %
BILIRUB SERPL-MCNC: 0.6 MG/DL
BILIRUB UR QL STRIP: NEGATIVE
BNP SERPL-MCNC: 303 PG/ML
BUN SERPL-MCNC: 23 MG/DL
CALCIUM SERPL-MCNC: 9.9 MG/DL
CHLORIDE SERPL-SCNC: 99 MMOL/L
CLARITY UR: CLEAR
CO2 SERPL-SCNC: 30 MMOL/L
COLOR UR: YELLOW
CREAT SERPL-MCNC: 1.4 MG/DL
DACRYOCYTES BLD QL SMEAR: ABNORMAL
DIFFERENTIAL METHOD: ABNORMAL
EOSINOPHIL NFR BLD: 2 %
ERYTHROCYTE [DISTWIDTH] IN BLOOD BY AUTOMATED COUNT: 15.5 %
EST. GFR  (AFRICAN AMERICAN): 56 ML/MIN/1.73 M^2
EST. GFR  (NON AFRICAN AMERICAN): 48 ML/MIN/1.73 M^2
GLUCOSE SERPL-MCNC: 127 MG/DL
GLUCOSE UR QL STRIP: NEGATIVE
HCT VFR BLD AUTO: 36.8 %
HGB BLD-MCNC: 11.6 G/DL
HGB UR QL STRIP: ABNORMAL
INR PPP: 2.1
KETONES UR QL STRIP: NEGATIVE
LEUKOCYTE ESTERASE UR QL STRIP: NEGATIVE
LYMPHOCYTES NFR BLD: 31 %
MCH RBC QN AUTO: 29.7 PG
MCHC RBC AUTO-ENTMCNC: 31.5 G/DL
MCV RBC AUTO: 94 FL
MONOCYTES NFR BLD: 9 %
NEUTROPHILS NFR BLD: 58 %
NITRITE UR QL STRIP: NEGATIVE
OVALOCYTES BLD QL SMEAR: ABNORMAL
PH UR STRIP: 7.5 [PH] (ref 5–8)
PLATELET # BLD AUTO: 65 K/UL
PLATELET BLD QL SMEAR: ABNORMAL
PMV BLD AUTO: 9 FL
POLYCHROMASIA BLD QL SMEAR: ABNORMAL
POTASSIUM SERPL-SCNC: 3.9 MMOL/L
PROT SERPL-MCNC: 7.2 G/DL
PROT UR QL STRIP: ABNORMAL
PROTHROMBIN TIME: 22.1 SEC
RBC # BLD AUTO: 3.91 M/UL
SODIUM SERPL-SCNC: 138 MMOL/L
SP GR UR STRIP: 1 (ref 1–1.03)
SPHEROCYTES BLD QL SMEAR: ABNORMAL
TROPONIN I SERPL DL<=0.01 NG/ML-MCNC: 0.01 NG/ML
URN SPEC COLLECT METH UR: ABNORMAL
UROBILINOGEN UR STRIP-ACNC: NEGATIVE EU/DL
WBC # BLD AUTO: 4.92 K/UL

## 2018-12-12 PROCEDURE — 25000242 PHARM REV CODE 250 ALT 637 W/ HCPCS: Performed by: FAMILY MEDICINE

## 2018-12-12 PROCEDURE — 94640 AIRWAY INHALATION TREATMENT: CPT

## 2018-12-12 PROCEDURE — 84484 ASSAY OF TROPONIN QUANT: CPT

## 2018-12-12 PROCEDURE — 36415 COLL VENOUS BLD VENIPUNCTURE: CPT

## 2018-12-12 PROCEDURE — 85027 COMPLETE CBC AUTOMATED: CPT

## 2018-12-12 PROCEDURE — 93005 ELECTROCARDIOGRAM TRACING: CPT

## 2018-12-12 PROCEDURE — 81003 URINALYSIS AUTO W/O SCOPE: CPT

## 2018-12-12 PROCEDURE — 85007 BL SMEAR W/DIFF WBC COUNT: CPT

## 2018-12-12 PROCEDURE — 93010 ELECTROCARDIOGRAM REPORT: CPT | Mod: ,,, | Performed by: INTERNAL MEDICINE

## 2018-12-12 PROCEDURE — 83880 ASSAY OF NATRIURETIC PEPTIDE: CPT

## 2018-12-12 PROCEDURE — 27000221 HC OXYGEN, UP TO 24 HOURS

## 2018-12-12 PROCEDURE — 99285 EMERGENCY DEPT VISIT HI MDM: CPT | Mod: 25

## 2018-12-12 PROCEDURE — 85610 PROTHROMBIN TIME: CPT

## 2018-12-12 PROCEDURE — G0378 HOSPITAL OBSERVATION PER HR: HCPCS

## 2018-12-12 PROCEDURE — 80053 COMPREHEN METABOLIC PANEL: CPT

## 2018-12-12 RX ORDER — LISINOPRIL 20 MG/1
40 TABLET ORAL DAILY
Status: DISCONTINUED | OUTPATIENT
Start: 2018-12-13 | End: 2018-12-14 | Stop reason: HOSPADM

## 2018-12-12 RX ORDER — ONDANSETRON 2 MG/ML
4 INJECTION INTRAMUSCULAR; INTRAVENOUS EVERY 8 HOURS PRN
Status: DISCONTINUED | OUTPATIENT
Start: 2018-12-12 | End: 2018-12-14 | Stop reason: HOSPADM

## 2018-12-12 RX ORDER — IPRATROPIUM BROMIDE AND ALBUTEROL SULFATE 2.5; .5 MG/3ML; MG/3ML
3 SOLUTION RESPIRATORY (INHALATION)
Status: COMPLETED | OUTPATIENT
Start: 2018-12-12 | End: 2018-12-12

## 2018-12-12 RX ORDER — ACETAMINOPHEN 325 MG/1
650 TABLET ORAL EVERY 6 HOURS PRN
Status: DISCONTINUED | OUTPATIENT
Start: 2018-12-12 | End: 2018-12-14 | Stop reason: HOSPADM

## 2018-12-12 RX ORDER — HYDROCHLOROTHIAZIDE 25 MG/1
25 TABLET ORAL DAILY
Status: DISCONTINUED | OUTPATIENT
Start: 2018-12-13 | End: 2018-12-13

## 2018-12-12 RX ORDER — LISINOPRIL AND HYDROCHLOROTHIAZIDE 12.5; 2 MG/1; MG/1
2 TABLET ORAL DAILY
Status: DISCONTINUED | OUTPATIENT
Start: 2018-12-13 | End: 2018-12-12 | Stop reason: RX

## 2018-12-12 RX ORDER — GLUCAGON 1 MG
1 KIT INJECTION
Status: DISCONTINUED | OUTPATIENT
Start: 2018-12-12 | End: 2018-12-14 | Stop reason: HOSPADM

## 2018-12-12 RX ORDER — GUAIFENESIN 400 MG/1
400 TABLET ORAL 3 TIMES DAILY PRN
COMMUNITY

## 2018-12-12 RX ORDER — IBUPROFEN 200 MG
16 TABLET ORAL
Status: DISCONTINUED | OUTPATIENT
Start: 2018-12-12 | End: 2018-12-14 | Stop reason: HOSPADM

## 2018-12-12 RX ORDER — IBUPROFEN 200 MG
24 TABLET ORAL
Status: DISCONTINUED | OUTPATIENT
Start: 2018-12-12 | End: 2018-12-14 | Stop reason: HOSPADM

## 2018-12-12 RX ORDER — IPRATROPIUM BROMIDE AND ALBUTEROL SULFATE 2.5; .5 MG/3ML; MG/3ML
3 SOLUTION RESPIRATORY (INHALATION) EVERY 4 HOURS PRN
Status: DISCONTINUED | OUTPATIENT
Start: 2018-12-12 | End: 2018-12-14 | Stop reason: HOSPADM

## 2018-12-12 RX ORDER — HYDRALAZINE HYDROCHLORIDE 20 MG/ML
10 INJECTION INTRAMUSCULAR; INTRAVENOUS EVERY 6 HOURS PRN
Status: DISCONTINUED | OUTPATIENT
Start: 2018-12-12 | End: 2018-12-14 | Stop reason: HOSPADM

## 2018-12-12 RX ORDER — INSULIN ASPART 100 [IU]/ML
0-5 INJECTION, SOLUTION INTRAVENOUS; SUBCUTANEOUS
Status: DISCONTINUED | OUTPATIENT
Start: 2018-12-12 | End: 2018-12-14 | Stop reason: HOSPADM

## 2018-12-12 RX ORDER — ATENOLOL 50 MG/1
100 TABLET ORAL DAILY
Status: DISCONTINUED | OUTPATIENT
Start: 2018-12-13 | End: 2018-12-14 | Stop reason: HOSPADM

## 2018-12-12 RX ORDER — GUAIFENESIN 100 MG/5ML
200 SOLUTION ORAL EVERY 4 HOURS PRN
Status: DISCONTINUED | OUTPATIENT
Start: 2018-12-12 | End: 2018-12-14 | Stop reason: HOSPADM

## 2018-12-12 RX ADMIN — IPRATROPIUM BROMIDE AND ALBUTEROL SULFATE 3 ML: .5; 3 SOLUTION RESPIRATORY (INHALATION) at 05:12

## 2018-12-12 NOTE — ED PROVIDER NOTES
"SCRIBE #1 NOTE: I, Page Banerjee, am scribing for, and in the presence of, Aster Deras MD. I have scribed the entire note.       History     Chief Complaint   Patient presents with    Shortness of Breath     pt reports he "has fluid on his lungs"- seen at [facility] yesterday for pleural effusion on right. supposed to be seen next thurs to have fluid pulled off lungs     Review of patient's allergies indicates:   Allergen Reactions    Sulfamethoxazole-trimethoprim Hives    Sulfa (sulfonamide antibiotics)      Other reaction(s): Stomach upset    Bacitracin Rash    Sulfamethoxazole Rash    Trimethoprim Rash     Bactrim           History of Present Illness     HPI    12/12/2018, 4:13 PM  History obtained from the patient      History of Present Illness: Raymond Stuart is a 78 y.o. male patient with a PMHx of Afib, type 2 DM, HTN, and hyperlipidemia who presents to the Emergency Department for evaluation of SOB which onset gradually months ago. Pt reports sxs worsened last night. Pt is in 80s room air. Pt reports he has fluid in his R lung. Symptoms are constant and moderate in severity.  No mitigating or exacerbating factors reported. Pt reports no associated sxs. Patient denies any CP, abd pain, palpitations, diaphoresis, leg swelling, cough, N/V, and all other sxs at this time. Pt reports femoral-popliteal bypass surgery in the past. Pt reports he is scheduled to have fluid removed next week. Pt is followed by Dr. Schaffer (Pulmonolgy). No further complaints or concerns at this time.         Arrival mode: Personal vehicle    PCP: JOHNATHON Cristobal Jr, MD        Past Medical History:  Past Medical History:   Diagnosis Date    A-fib     Diabetes mellitus, type 2     Hyperlipidemia     Hypertension     Irregular heartbeat     PVD (peripheral vascular disease)     S/P femoral-popliteal bypass surgery 8/8/2014    S/P peripheral artery angioplasty 8/8/2014       Past Surgical History:  Past Surgical " History:   Procedure Laterality Date    AMPUTATION- TOE Left 2017    [facility]formed by [facility]    CARDIAC ELECTROPHYSIOLOGY MAPPING AND ABLATION      COLONOSCOPY N/A 2014    Performed by Yinka Clifford MD at [facility] ENDO    POPLITEAL ARTERY STENT  2013    RECTOPERITONEAL FISTULA CLOSURE      right leg bipass      TOE SURGERY      Joint release    TONSILLECTOMY           Family History:  History reviewed. No pertinent family history.    Social History:  Social History     Tobacco Use    Smoking status: Former Smoker     Packs/day: 4.00     Years: 30.00     Pack years: 120.00     Types: Cigarettes     Last attempt to quit: 1994     Years since quittin.9    Smokeless tobacco: Never Used   Substance and Sexual Activity    Alcohol use: No    Drug use: No    Sexual activity: Unknown        Review of Systems     Review of Systems   Constitutional: Negative for diaphoresis and fever.   HENT: Negative for sore throat.    Respiratory: Positive for shortness of breath. Negative for cough.    Cardiovascular: Negative for chest pain, palpitations and leg swelling.   Gastrointestinal: Negative for abdominal pain, nausea and vomiting.   Genitourinary: Negative for dysuria.   Musculoskeletal: Negative for back pain.   Skin: Negative for rash.   Neurological: Negative for weakness.   Hematological: Does not bruise/bleed easily.   All other systems reviewed and are negative.       Physical Exam     Initial Vitals [18 1555]   BP Pulse Resp Temp SpO2   (!) 186/87 66 18 97.8 °F (36.6 °C) (!) 89 %      MAP       --          Physical Exam  Nursing Notes and Vital Signs Reviewed.  Constitutional: Patient is in no acute distress. Well-developed and well-nourished.  Head: Atraumatic. Normocephalic.  Eyes: PERRL. EOM intact. Conjunctivae are not pale. No scleral icterus.  ENT: Mucous membranes are moist. Oropharynx is clear and symmetric.    Neck: Supple. Full ROM. No  "lymphadenopathy.  Cardiovascular: Regular rate. Irregular irregular rhythm. No murmurs, rubs, or gallops. Distal pulses are 2+ and symmetric.  Pulmonary/Chest: No respiratory distress. Tachypneic w/ exertion. Decreased breath sounds in R lung base. No wheezing or rales.  Abdominal: Soft and non-distended.  There is no tenderness.  No rebound, guarding, or rigidity. Good bowel sounds.  Genitourinary: No CVA tenderness  Musculoskeletal: Moves all extremities. No obvious deformities. BLE 1+ edema at ankles. No calf tenderness.  Skin: Warm and dry.  Neurological:  Alert, awake, and appropriate.  Normal speech.  No acute focal neurological deficits are appreciated.  Psychiatric: Normal affect. Good eye contact. Appropriate in content.     ED Course   Procedures  ED Vital Signs:  Vitals:    12/12/18 1555 12/12/18 1602 12/12/18 1718   BP: (!) 186/87     Pulse: 66  84   Resp: 18  20   Temp: 97.8 °F (36.6 °C)     TempSrc: Oral     SpO2: (!) 89%  97%   Weight:  108.4 kg (239 lb)    Height: 5' 6" (1.676 m)         Abnormal Lab Results:  Labs Reviewed   CBC W/ AUTO DIFFERENTIAL - Abnormal; Notable for the following components:       Result Value    RBC 3.91 (*)     Hemoglobin 11.6 (*)     Hematocrit 36.8 (*)     MCHC 31.5 (*)     RDW 15.5 (*)     Platelets 65 (*)     MPV 9.0 (*)     Platelet Estimate Decreased (*)     All other components within normal limits   COMPREHENSIVE METABOLIC PANEL - Abnormal; Notable for the following components:    CO2 30 (*)     Glucose 127 (*)     Alkaline Phosphatase 51 (*)     eGFR if  56 (*)     eGFR if non  48 (*)     All other components within normal limits   URINALYSIS - Abnormal; Notable for the following components:    Protein, UA Trace (*)     Occult Blood UA Trace (*)     All other components within normal limits   B-TYPE NATRIURETIC PEPTIDE - Abnormal; Notable for the following components:     (*)     All other components within normal limits "   PROTIME-INR - Abnormal; Notable for the following components:    Prothrombin Time 22.1 (*)     INR 2.1 (*)     All other components within normal limits   TROPONIN I   PROTIME-INR        All Lab Results:  Results for orders placed or performed during the hospital encounter of 12/12/18   CBC auto differential   Result Value Ref Range    WBC 4.92 3.90 - 12.70 K/uL    RBC 3.91 (L) 4.60 - 6.20 M/uL    Hemoglobin 11.6 (L) 14.0 - 18.0 g/dL    Hematocrit 36.8 (L) 40.0 - 54.0 %    MCV 94 82 - 98 fL    MCH 29.7 27.0 - 31.0 pg    MCHC 31.5 (L) 32.0 - 36.0 g/dL    RDW 15.5 (H) 11.5 - 14.5 %    Platelets 65 (L) 150 - 350 K/uL    MPV 9.0 (L) 9.2 - 12.9 fL    Gran% 58.0 38.0 - 73.0 %    Lymph% 31.0 18.0 - 48.0 %    Mono% 9.0 4.0 - 15.0 %    Eosinophil% 2.0 0.0 - 8.0 %    Basophil% 0.0 0.0 - 1.9 %    Platelet Estimate Decreased (A)     Aniso Slight     Poly Occasional     Ovalocytes Occasional     Tear Drop Cells Occasional     Spherocytes Occasional     Differential Method Manual    Comprehensive metabolic panel   Result Value Ref Range    Sodium 138 136 - 145 mmol/L    Potassium 3.9 3.5 - 5.1 mmol/L    Chloride 99 95 - 110 mmol/L    CO2 30 (H) 23 - 29 mmol/L    Glucose 127 (H) 70 - 110 mg/dL    BUN, Bld 23 8 - 23 mg/dL    Creatinine 1.4 0.5 - 1.4 mg/dL    Calcium 9.9 8.7 - 10.5 mg/dL    Total Protein 7.2 6.0 - 8.4 g/dL    Albumin 4.0 3.5 - 5.2 g/dL    Total Bilirubin 0.6 0.1 - 1.0 mg/dL    Alkaline Phosphatase 51 (L) 55 - 135 U/L    AST 24 10 - 40 U/L    ALT 23 10 - 44 U/L    Anion Gap 9 8 - 16 mmol/L    eGFR if African American 56 (A) >60 mL/min/1.73 m^2    eGFR if non African American 48 (A) >60 mL/min/1.73 m^2   Urinalysis - Clean Catch   Result Value Ref Range    Specimen UA Urine, Clean Catch     Color, UA Yellow Yellow, Straw, Chaya    Appearance, UA Clear Clear    pH, UA 7.5 5.0 - 8.0    Specific Gravity, UA 1.005 1.005 - 1.030    Protein, UA Trace (A) Negative    Glucose, UA Negative Negative    Ketones, UA Negative  Negative    Bilirubin (UA) Negative Negative    Occult Blood UA Trace (A) Negative    Nitrite, UA Negative Negative    Urobilinogen, UA Negative <2.0 EU/dL    Leukocytes, UA Negative Negative   Troponin I   Result Value Ref Range    Troponin I 0.010 0.000 - 0.026 ng/mL   B-Type natriuretic peptide (BNP)   Result Value Ref Range     (H) 0 - 99 pg/mL   Protime-INR   Result Value Ref Range    Prothrombin Time 22.1 (H) 9.0 - 12.5 sec    INR 2.1 (H) 0.8 - 1.2         Imaging Results:  Imaging Results          X-Ray Chest PA And Lateral (Final result)  Result time 12/12/18 16:54:20    Final result by MARTINE Schneider Sr., MD (12/12/18 16:54:20)                 Impression:      1. There is a persistent small right pleural effusion with associated atelectasis.  2. There is mild cardiomegaly.  3. There are mild degenerative changes in the thoracic spine.  .      Electronically signed by: Tee Schneider MD  Date:    12/12/2018  Time:    16:54             Narrative:    EXAMINATION:  XR CHEST PA AND LATERAL    CLINICAL HISTORY:  Chest Pain;    COMPARISON:  12/05/2018    FINDINGS:  There is a persistent small right pleural effusion with associated atelectasis.  There is no focal pulmonary infiltrate visualized in the left lung.  There is no pneumothorax.  There are mild degenerative changes in the thoracic spine.  There is mild cardiomegaly..                                 The EKG was ordered, reviewed, and independently interpreted by the ED provider.  Interpretation time: 1715  Rate: 72 BPM  Rhythm: atrial fibrillation  Interpretation: No acute ST and T wave abnormality. No STEMI.           The Emergency Provider reviewed the vital signs and test results, which are outlined above.     ED Discussion     6:00 PM: Dr. Deras discussed the pt's case with Dr. Wright (Pulmonology) who acknowledged consult.    6:26 PM: Discussed case with Savage Valenzuela NP (Hospital Medicine). Dr. Gonzalez agrees with current care and  management of pt and accepts admission.   Admitting Service: Hospital medicine   Admitting Physician: Dr. Gonzalez  Admit to: Tele Obs    6:30 PM: Re-evaluated pt. I have discussed test results, shared treatment plan, and the need for admission with patient and family at bedside. Pt and family express understanding at this time and agree with all information. All questions answered. Pt and family have no further questions or concerns at this time. Pt is ready for admit.        ED Medication(s):  Medications   albuterol-ipratropium 2.5 mg-0.5 mg/3 mL nebulizer solution 3 mL (3 mLs Nebulization Given 12/12/18 3023)             Medical Decision Making:   Clinical Tests:   Lab Tests: Ordered and Reviewed  Radiological Study: Ordered and Reviewed  Medical Tests: Ordered and Reviewed             Scribe Attestation:   Scribe #1: I performed the above scribed service and the documentation accurately describes the services I performed. I attest to the accuracy of the note.     Attending:   Physician Attestation Statement for Scribe #1: I, Aster Deras MD, personally performed the services described in this documentation, as scribed by Page Banerjee, in my presence, and it is both accurate and complete.           Clinical Impression     No diagnosis found.    Disposition:   Disposition: Placed in Observation  Condition: Fair         Aster Deras MD  12/13/18 0847

## 2018-12-12 NOTE — TELEPHONE ENCOUNTER
Called patient daughter, the patient had a rough night, workup severely short of breath, I advised her to bring him to the emergency room for admission, diuresis, holding Coumadin and Plavix and deciding on thoracentesis of the right side.      ----- Message from Yvonne Baum LPN sent at 12/12/2018  8:17 AM CST -----  Contact: Yarely/luca daughter       ----- Message -----  From: Armida Fagan  Sent: 12/12/2018   7:20 AM  To: Sarbjit Sánchez Staff    Call caller regarding pt couldn't breathing last night and need to know what she need to do cause pt has to get fluid removed and has to be off med for 5 days. Please call caller at [phone]

## 2018-12-13 PROBLEM — J96.20 RESPIRATORY FAILURE, ACUTE AND CHRONIC: Status: ACTIVE | Noted: 2018-12-13

## 2018-12-13 PROBLEM — J96.20 ACUTE ON CHRONIC RESPIRATORY FAILURE: Status: ACTIVE | Noted: 2018-12-13

## 2018-12-13 PROBLEM — J20.9 BRONCHITIS, ACUTE: Status: ACTIVE | Noted: 2018-12-13

## 2018-12-13 PROBLEM — R59.0 LYMPHADENOPATHY, MEDIASTINAL: Status: ACTIVE | Noted: 2018-12-13

## 2018-12-13 PROBLEM — I50.33 DIASTOLIC CHF, ACUTE ON CHRONIC: Status: ACTIVE | Noted: 2018-12-13

## 2018-12-13 PROBLEM — J44.1 COPD EXACERBATION: Status: ACTIVE | Noted: 2018-12-13

## 2018-12-13 LAB
ALLENS TEST: ABNORMAL
ANION GAP SERPL CALC-SCNC: 9 MMOL/L
AORTIC VALVE STENOSIS: ABNORMAL
BASOPHILS # BLD AUTO: 0.01 K/UL
BASOPHILS NFR BLD: 0.2 %
BUN SERPL-MCNC: 21 MG/DL
CALCIUM SERPL-MCNC: 9.8 MG/DL
CHLORIDE SERPL-SCNC: 101 MMOL/L
CO2 SERPL-SCNC: 32 MMOL/L
CREAT SERPL-MCNC: 1.3 MG/DL
DELSYS: ABNORMAL
DIASTOLIC DYSFUNCTION: YES
DIFFERENTIAL METHOD: ABNORMAL
EOSINOPHIL # BLD AUTO: 0 K/UL
EOSINOPHIL NFR BLD: 0.9 %
ERYTHROCYTE [DISTWIDTH] IN BLOOD BY AUTOMATED COUNT: 15.7 %
EST. GFR  (AFRICAN AMERICAN): >60 ML/MIN/1.73 M^2
EST. GFR  (NON AFRICAN AMERICAN): 53 ML/MIN/1.73 M^2
ESTIMATED PA SYSTOLIC PRESSURE: 26.63
FIO2: 21
GLUCOSE SERPL-MCNC: 169 MG/DL
HCO3 UR-SCNC: 32.9 MMOL/L (ref 24–28)
HCT VFR BLD AUTO: 37.7 %
HGB BLD-MCNC: 11.6 G/DL
INR PPP: 1.8
LDH SERPL L TO P-CCNC: 300 U/L
LYMPHOCYTES # BLD AUTO: 1.2 K/UL
LYMPHOCYTES NFR BLD: 25.1 %
MCH RBC QN AUTO: 29.4 PG
MCHC RBC AUTO-ENTMCNC: 30.8 G/DL
MCV RBC AUTO: 95 FL
MITRAL VALVE REGURGITATION: ABNORMAL
MODE: ABNORMAL
MONOCYTES # BLD AUTO: 0.9 K/UL
MONOCYTES NFR BLD: 18.2 %
NEUTROPHILS # BLD AUTO: 2.6 K/UL
NEUTROPHILS NFR BLD: 55.6 %
PCO2 BLDA: 51.7 MMHG (ref 35–45)
PH SMN: 7.41 [PH] (ref 7.35–7.45)
PLATELET # BLD AUTO: 69 K/UL
PMV BLD AUTO: 9.4 FL
PO2 BLDA: 44 MMHG (ref 80–100)
POC BE: 8 MMOL/L
POC SATURATED O2: 80 % (ref 95–100)
POCT GLUCOSE: 157 MG/DL (ref 70–110)
POCT GLUCOSE: 240 MG/DL (ref 70–110)
POCT GLUCOSE: 291 MG/DL (ref 70–110)
POCT GLUCOSE: 325 MG/DL (ref 70–110)
POCT GLUCOSE: 346 MG/DL (ref 70–110)
POTASSIUM SERPL-SCNC: 4.2 MMOL/L
PROTHROMBIN TIME: 19.2 SEC
RBC # BLD AUTO: 3.95 M/UL
RETIRED EF AND QEF - SEE NOTES: 60 (ref 55–65)
SAMPLE: ABNORMAL
SITE: ABNORMAL
SODIUM SERPL-SCNC: 142 MMOL/L
TROPONIN I SERPL DL<=0.01 NG/ML-MCNC: <0.006 NG/ML
WBC # BLD AUTO: 4.67 K/UL

## 2018-12-13 PROCEDURE — 82803 BLOOD GASES ANY COMBINATION: CPT

## 2018-12-13 PROCEDURE — 63600175 PHARM REV CODE 636 W HCPCS: Performed by: NURSE PRACTITIONER

## 2018-12-13 PROCEDURE — 27000221 HC OXYGEN, UP TO 24 HOURS

## 2018-12-13 PROCEDURE — 25000003 PHARM REV CODE 250: Performed by: NURSE PRACTITIONER

## 2018-12-13 PROCEDURE — 80048 BASIC METABOLIC PNL TOTAL CA: CPT

## 2018-12-13 PROCEDURE — 84484 ASSAY OF TROPONIN QUANT: CPT

## 2018-12-13 PROCEDURE — 99900035 HC TECH TIME PER 15 MIN (STAT)

## 2018-12-13 PROCEDURE — 94761 N-INVAS EAR/PLS OXIMETRY MLT: CPT

## 2018-12-13 PROCEDURE — 21400001 HC TELEMETRY ROOM

## 2018-12-13 PROCEDURE — 85610 PROTHROMBIN TIME: CPT

## 2018-12-13 PROCEDURE — 96372 THER/PROPH/DIAG INJ SC/IM: CPT

## 2018-12-13 PROCEDURE — 93306 TTE W/DOPPLER COMPLETE: CPT | Mod: 26,,, | Performed by: INTERNAL MEDICINE

## 2018-12-13 PROCEDURE — 94640 AIRWAY INHALATION TREATMENT: CPT

## 2018-12-13 PROCEDURE — 94760 N-INVAS EAR/PLS OXIMETRY 1: CPT

## 2018-12-13 PROCEDURE — 63600175 PHARM REV CODE 636 W HCPCS: Performed by: INTERNAL MEDICINE

## 2018-12-13 PROCEDURE — 25000003 PHARM REV CODE 250: Performed by: FAMILY MEDICINE

## 2018-12-13 PROCEDURE — 36415 COLL VENOUS BLD VENIPUNCTURE: CPT

## 2018-12-13 PROCEDURE — 96374 THER/PROPH/DIAG INJ IV PUSH: CPT

## 2018-12-13 PROCEDURE — 25000242 PHARM REV CODE 250 ALT 637 W/ HCPCS: Performed by: INTERNAL MEDICINE

## 2018-12-13 PROCEDURE — 85025 COMPLETE CBC W/AUTO DIFF WBC: CPT

## 2018-12-13 PROCEDURE — 25000242 PHARM REV CODE 250 ALT 637 W/ HCPCS: Performed by: NURSE PRACTITIONER

## 2018-12-13 PROCEDURE — 25000003 PHARM REV CODE 250: Performed by: INTERNAL MEDICINE

## 2018-12-13 PROCEDURE — 93306 TTE W/DOPPLER COMPLETE: CPT

## 2018-12-13 PROCEDURE — 36600 WITHDRAWAL OF ARTERIAL BLOOD: CPT

## 2018-12-13 PROCEDURE — 83615 LACTATE (LD) (LDH) ENZYME: CPT

## 2018-12-13 RX ORDER — BUDESONIDE 0.5 MG/2ML
0.5 INHALANT ORAL EVERY 12 HOURS
Status: DISCONTINUED | OUTPATIENT
Start: 2018-12-13 | End: 2018-12-14 | Stop reason: HOSPADM

## 2018-12-13 RX ORDER — GABAPENTIN 300 MG/1
600 CAPSULE ORAL 2 TIMES DAILY
Status: DISCONTINUED | OUTPATIENT
Start: 2018-12-13 | End: 2018-12-14 | Stop reason: HOSPADM

## 2018-12-13 RX ORDER — PANTOPRAZOLE SODIUM 40 MG/1
40 TABLET, DELAYED RELEASE ORAL DAILY
Status: DISCONTINUED | OUTPATIENT
Start: 2018-12-13 | End: 2018-12-14 | Stop reason: HOSPADM

## 2018-12-13 RX ORDER — IPRATROPIUM BROMIDE AND ALBUTEROL SULFATE 2.5; .5 MG/3ML; MG/3ML
3 SOLUTION RESPIRATORY (INHALATION) EVERY 6 HOURS
Status: DISCONTINUED | OUTPATIENT
Start: 2018-12-13 | End: 2018-12-14 | Stop reason: HOSPADM

## 2018-12-13 RX ORDER — FUROSEMIDE 10 MG/ML
40 INJECTION INTRAMUSCULAR; INTRAVENOUS 2 TIMES DAILY
Status: DISCONTINUED | OUTPATIENT
Start: 2018-12-13 | End: 2018-12-14 | Stop reason: HOSPADM

## 2018-12-13 RX ORDER — IPRATROPIUM BROMIDE AND ALBUTEROL SULFATE 2.5; .5 MG/3ML; MG/3ML
3 SOLUTION RESPIRATORY (INHALATION) EVERY 6 HOURS
Status: DISCONTINUED | OUTPATIENT
Start: 2018-12-13 | End: 2018-12-13

## 2018-12-13 RX ORDER — ENOXAPARIN SODIUM 100 MG/ML
40 INJECTION SUBCUTANEOUS EVERY 24 HOURS
Status: DISCONTINUED | OUTPATIENT
Start: 2018-12-13 | End: 2018-12-14 | Stop reason: HOSPADM

## 2018-12-13 RX ORDER — ARFORMOTEROL TARTRATE 15 UG/2ML
15 SOLUTION RESPIRATORY (INHALATION) 2 TIMES DAILY
Status: DISCONTINUED | OUTPATIENT
Start: 2018-12-13 | End: 2018-12-13

## 2018-12-13 RX ORDER — PREDNISONE 20 MG/1
60 TABLET ORAL DAILY
Status: DISCONTINUED | OUTPATIENT
Start: 2018-12-13 | End: 2018-12-14 | Stop reason: HOSPADM

## 2018-12-13 RX ORDER — ARFORMOTEROL TARTRATE 15 UG/2ML
15 SOLUTION RESPIRATORY (INHALATION) EVERY 12 HOURS
Status: DISCONTINUED | OUTPATIENT
Start: 2018-12-13 | End: 2018-12-14 | Stop reason: HOSPADM

## 2018-12-13 RX ADMIN — ARFORMOTEROL TARTRATE 15 MCG: 15 SOLUTION RESPIRATORY (INHALATION) at 08:12

## 2018-12-13 RX ADMIN — IPRATROPIUM BROMIDE AND ALBUTEROL SULFATE 3 ML: .5; 3 SOLUTION RESPIRATORY (INHALATION) at 08:12

## 2018-12-13 RX ADMIN — ATENOLOL 100 MG: 50 TABLET ORAL at 09:12

## 2018-12-13 RX ADMIN — BUDESONIDE 0.5 MG: 0.5 SUSPENSION RESPIRATORY (INHALATION) at 07:12

## 2018-12-13 RX ADMIN — ACETAMINOPHEN 650 MG: 325 TABLET ORAL at 03:12

## 2018-12-13 RX ADMIN — GABAPENTIN 600 MG: 300 CAPSULE ORAL at 08:12

## 2018-12-13 RX ADMIN — FUROSEMIDE 40 MG: 10 INJECTION, SOLUTION INTRAVENOUS at 09:12

## 2018-12-13 RX ADMIN — BUDESONIDE 0.5 MG: 0.5 SUSPENSION RESPIRATORY (INHALATION) at 01:12

## 2018-12-13 RX ADMIN — LISINOPRIL 40 MG: 20 TABLET ORAL at 09:12

## 2018-12-13 RX ADMIN — ENOXAPARIN SODIUM 40 MG: 100 INJECTION SUBCUTANEOUS at 05:12

## 2018-12-13 RX ADMIN — INSULIN ASPART 2 UNITS: 100 INJECTION, SOLUTION INTRAVENOUS; SUBCUTANEOUS at 08:12

## 2018-12-13 RX ADMIN — PREDNISONE 60 MG: 20 TABLET ORAL at 02:12

## 2018-12-13 RX ADMIN — GABAPENTIN 600 MG: 300 CAPSULE ORAL at 01:12

## 2018-12-13 RX ADMIN — INSULIN ASPART 2 UNITS: 100 INJECTION, SOLUTION INTRAVENOUS; SUBCUTANEOUS at 05:12

## 2018-12-13 RX ADMIN — INSULIN ASPART 4 UNITS: 100 INJECTION, SOLUTION INTRAVENOUS; SUBCUTANEOUS at 02:12

## 2018-12-13 RX ADMIN — IPRATROPIUM BROMIDE AND ALBUTEROL SULFATE 3 ML: .5; 3 SOLUTION RESPIRATORY (INHALATION) at 07:12

## 2018-12-13 RX ADMIN — PANTOPRAZOLE SODIUM 40 MG: 40 TABLET, DELAYED RELEASE ORAL at 02:12

## 2018-12-13 RX ADMIN — FUROSEMIDE 40 MG: 10 INJECTION, SOLUTION INTRAVENOUS at 05:12

## 2018-12-13 RX ADMIN — IPRATROPIUM BROMIDE AND ALBUTEROL SULFATE 3 ML: .5; 3 SOLUTION RESPIRATORY (INHALATION) at 01:12

## 2018-12-13 NOTE — ASSESSMENT & PLAN NOTE
Currently rate controlled.  Continue atenolol.  INR 2.1.  Will hold warfarin in anticipation for thoracentesis in a.m..

## 2018-12-13 NOTE — SUBJECTIVE & OBJECTIVE
Past Medical History:   Diagnosis Date    A-fib     Diabetes mellitus, type 2     Hyperlipidemia     Hypertension     Irregular heartbeat     PVD (peripheral vascular disease)     S/P femoral-popliteal bypass surgery 8/8/2014    S/P peripheral artery angioplasty 8/8/2014       Past Surgical History:   Procedure Laterality Date    AMPUTATION- TOE Left 2/17/2017    [facility]    CARDIAC ELECTROPHYSIOLOGY MAPPING AND ABLATION      COLONOSCOPY N/A 1/9/2014    Performed by Yinka Clifford MD at [facility] ENDO    POPLITEAL ARTERY STENT  2013    RECTOPERITONEAL FISTULA CLOSURE      right leg bipass      TOE SURGERY      Joint release    TONSILLECTOMY         Review of patient's allergies indicates:   Allergen Reactions    Sulfamethoxazole-trimethoprim Hives    Sulfa (sulfonamide antibiotics)      Other reaction(s): Stomach upset    Bacitracin Rash    Sulfamethoxazole Rash    Trimethoprim Rash     Bactrim         No current facility-administered medications on file prior to encounter.      Current Outpatient Medications on File Prior to Encounter   Medication Sig    albuterol (PROVENTIL/VENTOLIN HFA) 90 mcg/actuation inhaler Inhale 1-2 puffs into the lungs every 4 (four) hours as needed for Wheezing or Shortness of Breath (cough).    atenolol (TENORMIN) 100 MG tablet TAKE 1 TABLET BY MOUTH ONCE DAILY    clopidogrel (PLAVIX) 75 mg tablet TAKE 1 TABLET BY MOUTH  DAILY    daptomycin (CUBICIN) injection     fenofibrate micronized (LOFIBRA) 134 MG Cap Take 1 capsule (134 mg total) by mouth once daily.    fluorouracil (EFUDEX) 5 % cream     gabapentin (NEURONTIN) 600 MG tablet TAKE 2 TABLETS BY MOUTH TWO TIMES DAILY    glipizide-metformin (METAGLIP) 2.5-500 mg per tablet TAKE 2 TABLETS BY MOUTH  TWICE A DAY BEFORE MEALS IN THE MORNING AND EVENING    guaiFENesin (HUMIBID E) 400 mg Tab Take 400 mg by mouth 3 (three) times daily as needed.    imiquimod (ALDARA) 5 % cream      lancets Misc To check BG 2 times daily, to use with insurance preferred meter    lisinopril-hydrochlorothiazide (PRINZIDE,ZESTORETIC) 20-12.5 mg per tablet TAKE 2 TABLETS BY MOUTH  ONCE DAILY    ONETOUCH DELICA LANCETS 30 gauge Misc USE TWO TIMES DAILY AND AS  NEEDED    ONETOUCH ULTRA BLUE TEST STRIP Strp USE TWO TIMES DAILY AND AS  NEEDED    rosuvastatin (CRESTOR) 40 MG Tab TAKE 1 TABLET BY MOUTH ONCE DAILY    SANTYL ointment     warfarin (COUMADIN) 5 MG tablet TAKE 1 AND 1/2 TABLETS BY  MOUTH ON WEDNESDAY AND   AND 1 TABLET ALL  REMAINING DAYS AS DIRECTED  BY THE COUMADIN CLINIC.    blood-glucose meter kit To check BG 2 times daily, to use with insurance preferred meter     Family History     Reviewed and Not Pertinent        Tobacco Use    Smoking status: Former Smoker     Packs/day: 4.00     Years: 30.00     Pack years: 120.00     Types: Cigarettes     Last attempt to quit: 1994     Years since quittin.9    Smokeless tobacco: Never Used   Substance and Sexual Activity    Alcohol use: No    Drug use: No    Sexual activity: Not on file     Review of Systems   Constitutional: Negative.  Negative for appetite change, fatigue and fever.   HENT: Negative.  Negative for congestion, nosebleeds and sore throat.    Eyes: Negative.  Negative for photophobia and visual disturbance.   Respiratory: Positive for shortness of breath. Negative for cough and wheezing.    Cardiovascular: Negative.  Negative for chest pain, palpitations and leg swelling.   Gastrointestinal: Negative.  Negative for abdominal pain, constipation, diarrhea, nausea and vomiting.   Endocrine: Negative.  Negative for polyuria.   Genitourinary: Negative.  Negative for dysuria, flank pain, frequency and urgency.   Musculoskeletal: Negative.  Negative for arthralgias, back pain and joint swelling.   Skin: Negative.  Negative for color change, pallor and rash.   Allergic/Immunologic: Negative.  Negative for immunocompromised  state.   Neurological: Negative.  Negative for dizziness, syncope, weakness, light-headedness, numbness and headaches.   Hematological: Negative.    Psychiatric/Behavioral: Negative.  Negative for confusion and hallucinations. The patient is not nervous/anxious.    All other systems reviewed and are negative.    Objective:     Vital Signs (Most Recent):  Temp: 97.8 °F (36.6 °C) (12/12/18 1555)  Pulse: 84 (12/12/18 1718)  Resp: 20 (12/12/18 1718)  BP: (!) 186/87 (12/12/18 1555)  SpO2: 97 % (12/12/18 1718) Vital Signs (24h Range):  Temp:  [97.8 °F (36.6 °C)] 97.8 °F (36.6 °C)  Pulse:  [66-84] 84  Resp:  [18-20] 20  SpO2:  [89 %-97 %] 97 %  BP: (186)/(87) 186/87     Weight: 108.4 kg (239 lb)  Body mass index is 38.58 kg/m².    Physical Exam   Constitutional: He is oriented to person, place, and time. No distress.   HENT:   Head: Normocephalic and atraumatic.   Eyes: Conjunctivae and EOM are normal. Pupils are equal, round, and reactive to light. No scleral icterus.   Neck: Normal range of motion. Neck supple. No thyromegaly present.   Cardiovascular: Normal rate, regular rhythm, normal heart sounds and intact distal pulses.   No murmur heard.  Pulmonary/Chest: Effort normal. No accessory muscle usage. No tachypnea. No respiratory distress. He has decreased breath sounds in the right middle field and the right lower field. He has no wheezes. He exhibits no tenderness.   Abdominal: Soft. Bowel sounds are normal. There is no tenderness.   Musculoskeletal: Normal range of motion. He exhibits no edema or tenderness.   Lymphadenopathy:     He has no cervical adenopathy.   Neurological: He is alert and oriented to person, place, and time. No cranial nerve deficit. He exhibits normal muscle tone. Coordination normal.   Skin: Skin is warm and dry. He is not diaphoretic.   Psychiatric: He has a normal mood and affect. His behavior is normal. Thought content normal.   Nursing note and vitals reviewed.        CRANIAL NERVES     CN  III, IV, VI   Pupils are equal, round, and reactive to light.  Extraocular motions are normal.        Significant Labs:   ABGs: No results for input(s): PH, PCO2, HCO3, POCSATURATED, BE, TOTALHB, COHB, METHB, O2HB, POCFIO2 in the last 48 hours.  BMP:   Recent Labs   Lab 12/12/18  1647   *      K 3.9   CL 99   CO2 30*   BUN 23   CREATININE 1.4   CALCIUM 9.9     CBC:   Recent Labs   Lab 12/12/18  1647   WBC 4.92   HGB 11.6*   HCT 36.8*   PLT 65*     CMP:   Recent Labs   Lab 12/12/18  1647      K 3.9   CL 99   CO2 30*   *   BUN 23   CREATININE 1.4   CALCIUM 9.9   PROT 7.2   ALBUMIN 4.0   BILITOT 0.6   ALKPHOS 51*   AST 24   ALT 23   ANIONGAP 9   EGFRNONAA 48*     Cardiac Markers:   Recent Labs   Lab 12/12/18  1647   *     Lactic Acid: No results for input(s): LACTATE in the last 48 hours.  Lipid Panel: No results for input(s): CHOL, HDL, LDLCALC, TRIG, CHOLHDL in the last 48 hours.  Troponin:   Recent Labs   Lab 12/12/18  1647   TROPONINI 0.010     TSH: No results for input(s): TSH in the last 4320 hours.  Urine Studies:   Recent Labs   Lab 12/12/18  1720   COLORU Yellow   APPEARANCEUA Clear   PHUR 7.5   SPECGRAV 1.005   PROTEINUA Trace*   GLUCUA Negative   KETONESU Negative   BILIRUBINUA Negative   OCCULTUA Trace*   NITRITE Negative   UROBILINOGEN Negative   LEUKOCYTESUR Negative     All pertinent labs within the past 24 hours have been reviewed.    Significant Imaging: I have reviewed and interpreted all pertinent imaging results/findings within the past 24 hours.     Imaging Results          X-Ray Chest PA And Lateral (Final result)  Result time 12/12/18 16:54:20    Final result by MARTINE Schneider Sr., MD (12/12/18 16:54:20)                 Impression:      1. There is a persistent small right pleural effusion with associated atelectasis.  2. There is mild cardiomegaly.  3. There are mild degenerative changes in the thoracic spine.  .      Electronically signed by: Tee Schneider  MD  Date:    12/12/2018  Time:    16:54             Narrative:    EXAMINATION:  XR CHEST PA AND LATERAL    CLINICAL HISTORY:  Chest Pain;    COMPARISON:  12/05/2018    FINDINGS:  There is a persistent small right pleural effusion with associated atelectasis.  There is no focal pulmonary infiltrate visualized in the left lung.  There is no pneumothorax.  There are mild degenerative changes in the thoracic spine.  There is mild cardiomegaly..                                I have independently reviewed and interpreted the EKG.     I have independently reviewed all pertinent labs within the past 24 hours.    I have independently reviewed, visualized and interpreted all pertinent imaging results within the past 24 hours and discussed the findings with the ED physician, Dr. Deras.

## 2018-12-13 NOTE — ASSESSMENT & PLAN NOTE
Appears chronic but needs to follow up with heme/onc   No evidence of bleeding.  Repeat labs in a.m..

## 2018-12-13 NOTE — ASSESSMENT & PLAN NOTE
BP elevated   Continue lisinopril and atenolol, lasix added   Monitor and make adjustments as needed

## 2018-12-13 NOTE — H&P
"[facility] Hospital Medicine  History & Physical    Patient Name: Raymond Stuart  MRN: [id number]  Admission Date: 12/12/2018  Attending Physician: Merrill Gonzalez MD  Primary Care Provider: JOHNATHON Cristobal Jr, MD         Patient information was obtained from patient, spouse/SO, past medical records and ER records.     Subjective:     Principal Problem:Pleural effusion on right    Chief Complaint:   Chief Complaint   Patient presents with    Shortness of Breath     pt reports he "has fluid on his lungs"- seen at [facility] clinic yesterday for pleural effusion on right. supposed to be seen next thurs to have fluid pulled off lungs        HPI: Mr. Stuart is a 78-year-old  male with PMH significant for A.Fib, HTN, T2DM, his has been having right-sided pleural effusion for few days, seen in the Pulmonary Clinic in scheduled to have a thoracentesis done as outpatient on 12/20/2018.  Patient is on warfarin for AFib, was advised to hold the warfarin and the Plavix for five days which he started during last night.  However this morning patient's shortness of breath got worse, he was unable to ambulate to his mailbox.  He called his pulmonologist Dr. Schaffer, who recommended patient go to the ER for admission for possible thoracentesis.  Patient is comfortably lying in bed at this time, in no distress.  Is not home oxygen-dependent.  Laboratory workup is unremarkable.  Chest x-ray reveals right-sided pleural effusion.  INR 2.1.  Platelets [id number].    Past Medical History:   Diagnosis Date    A-fib     Diabetes mellitus, type 2     Hyperlipidemia     Hypertension     Irregular heartbeat     PVD (peripheral vascular disease)     S/P femoral-popliteal bypass surgery 8/8/2014    S/P peripheral artery angioplasty 8/8/2014       Past Surgical History:   Procedure Laterality Date    AMPUTATION- TOE Left 2/17/2017    [facility] at [facility]    CARDIAC ELECTROPHYSIOLOGY MAPPING AND " ABLATION      COLONOSCOPY N/A 1/9/2014    Performed by Yinka Clifford MD at [facility] ENDO    POPLITEAL ARTERY STENT  2013    RECTOPERITONEAL FISTULA CLOSURE      right leg bipass      TOE SURGERY      Joint release    TONSILLECTOMY         Review of patient's allergies indicates:   Allergen Reactions    Sulfamethoxazole-trimethoprim Hives    Sulfa (sulfonamide antibiotics)      Other reaction(s): Stomach upset    Bacitracin Rash    Sulfamethoxazole Rash    Trimethoprim Rash     Bactrim         No current facility-administered medications on file prior to encounter.      Current Outpatient Medications on File Prior to Encounter   Medication Sig    albuterol (PROVENTIL/VENTOLIN HFA) 90 mcg/actuation inhaler Inhale 1-2 puffs into the lungs every 4 (four) hours as needed for Wheezing or Shortness of Breath (cough).    atenolol (TENORMIN) 100 MG tablet TAKE 1 TABLET BY MOUTH ONCE DAILY    clopidogrel (PLAVIX) 75 mg tablet TAKE 1 TABLET BY MOUTH  DAILY    daptomycin (CUBICIN) injection     fenofibrate micronized (LOFIBRA) 134 MG Cap Take 1 capsule (134 mg total) by mouth once daily.    fluorouracil (EFUDEX) 5 % cream     gabapentin (NEURONTIN) 600 MG tablet TAKE 2 TABLETS BY MOUTH TWO TIMES DAILY    glipizide-metformin (METAGLIP) 2.5-500 mg per tablet TAKE 2 TABLETS BY MOUTH  TWICE A DAY BEFORE MEALS IN THE MORNING AND EVENING    guaiFENesin (HUMIBID E) 400 mg Tab Take 400 mg by mouth 3 (three) times daily as needed.    imiquimod (ALDARA) 5 % cream     lancets Misc To check BG 2 times daily, to use with insurance preferred meter    lisinopril-hydrochlorothiazide (PRINZIDE,ZESTORETIC) 20-12.5 mg per tablet TAKE 2 TABLETS BY MOUTH  ONCE DAILY    ONETOUCH DELICA LANCETS 30 gauge Misc USE TWO TIMES DAILY AND AS  NEEDED    ONETOUCH ULTRA BLUE TEST STRIP Strp USE TWO TIMES DAILY AND AS  NEEDED    rosuvastatin (CRESTOR) 40 MG Tab TAKE 1 TABLET BY MOUTH ONCE DAILY    SANTYL ointment     warfarin (COUMADIN)  5 MG tablet TAKE 1 AND 1/2 TABLETS BY  MOUTH ON WEDNESDAY AND   AND 1 TABLET ALL  REMAINING DAYS AS DIRECTED  BY THE COUMADIN CLINIC.    blood-glucose meter kit To check BG 2 times daily, to use with insurance preferred meter     Family History     Reviewed and Not Pertinent        Tobacco Use    Smoking status: Former Smoker     Packs/day: 4.00     Years: 30.00     Pack years: 120.00     Types: Cigarettes     Last attempt to quit: 1994     Years since quittin.9    Smokeless tobacco: Never Used   Substance and Sexual Activity    Alcohol use: No    Drug use: No    Sexual activity: Not on file     Review of Systems   Constitutional: Negative.  Negative for appetite change, fatigue and fever.   HENT: Negative.  Negative for congestion, nosebleeds and sore throat.    Eyes: Negative.  Negative for photophobia and visual disturbance.   Respiratory: Positive for shortness of breath. Negative for cough and wheezing.    Cardiovascular: Negative.  Negative for chest pain, palpitations and leg swelling.   Gastrointestinal: Negative.  Negative for abdominal pain, constipation, diarrhea, nausea and vomiting.   Endocrine: Negative.  Negative for polyuria.   Genitourinary: Negative.  Negative for dysuria, flank pain, frequency and urgency.   Musculoskeletal: Negative.  Negative for arthralgias, back pain and joint swelling.   Skin: Negative.  Negative for color change, pallor and rash.   Allergic/Immunologic: Negative.  Negative for immunocompromised state.   Neurological: Negative.  Negative for dizziness, syncope, weakness, light-headedness, numbness and headaches.   Hematological: Negative.    Psychiatric/Behavioral: Negative.  Negative for confusion and hallucinations. The patient is not nervous/anxious.    All other systems reviewed and are negative.    Objective:     Vital Signs (Most Recent):  Temp: 97.8 °F (36.6 °C) (18 1555)  Pulse: 84 (18)  Resp: 20 (18)  BP: (!) 186/87  (12/12/18 1555)  SpO2: 97 % (12/12/18 1718) Vital Signs (24h Range):  Temp:  [97.8 °F (36.6 °C)] 97.8 °F (36.6 °C)  Pulse:  [66-84] 84  Resp:  [18-20] 20  SpO2:  [89 %-97 %] 97 %  BP: (186)/(87) 186/87     Weight: 108.4 kg (239 lb)  Body mass index is 38.58 kg/m².    Physical Exam   Constitutional: He is oriented to person, place, and time. No distress.   HENT:   Head: Normocephalic and atraumatic.   Eyes: Conjunctivae and EOM are normal. Pupils are equal, round, and reactive to light. No scleral icterus.   Neck: Normal range of motion. Neck supple. No thyromegaly present.   Cardiovascular: Normal rate, regular rhythm, normal heart sounds and intact distal pulses.   No murmur heard.  Pulmonary/Chest: Effort normal. No accessory muscle usage. No tachypnea. No respiratory distress. He has decreased breath sounds in the right middle field and the right lower field. He has no wheezes. He exhibits no tenderness.   Abdominal: Soft. Bowel sounds are normal. There is no tenderness.   Musculoskeletal: Normal range of motion. He exhibits no edema or tenderness.   Lymphadenopathy:     He has no cervical adenopathy.   Neurological: He is alert and oriented to person, place, and time. No cranial nerve deficit. He exhibits normal muscle tone. Coordination normal.   Skin: Skin is warm and dry. He is not diaphoretic.   Psychiatric: He has a normal mood and affect. His behavior is normal. Thought content normal.   Nursing note and vitals reviewed.        CRANIAL NERVES     CN III, IV, VI   Pupils are equal, round, and reactive to light.  Extraocular motions are normal.        Significant Labs:   ABGs: No results for input(s): PH, PCO2, HCO3, POCSATURATED, BE, TOTALHB, COHB, METHB, O2HB, POCFIO2 in the last 48 hours.  BMP:   Recent Labs   Lab 12/12/18  1647   *      K 3.9   CL 99   CO2 30*   BUN 23   CREATININE 1.4   CALCIUM 9.9     CBC:   Recent Labs   Lab 12/12/18  1647   WBC 4.92   HGB 11.6*   HCT 36.8*   PLT 65*      CMP:   Recent Labs   Lab 12/12/18  1647      K 3.9   CL 99   CO2 30*   *   BUN 23   CREATININE 1.4   CALCIUM 9.9   PROT 7.2   ALBUMIN 4.0   BILITOT 0.6   ALKPHOS 51*   AST 24   ALT 23   ANIONGAP 9   EGFRNONAA 48*     Cardiac Markers:   Recent Labs   Lab 12/12/18  1647   *     Lactic Acid: No results for input(s): LACTATE in the last 48 hours.  Lipid Panel: No results for input(s): CHOL, HDL, LDLCALC, TRIG, CHOLHDL in the last 48 hours.  Troponin:   Recent Labs   Lab 12/12/18  1647   TROPONINI 0.010     TSH: No results for input(s): TSH in the last 4320 hours.  Urine Studies:   Recent Labs   Lab 12/12/18  1720   COLORU Yellow   APPEARANCEUA Clear   PHUR 7.5   SPECGRAV 1.005   PROTEINUA Trace*   GLUCUA Negative   KETONESU Negative   BILIRUBINUA Negative   OCCULTUA Trace*   NITRITE Negative   UROBILINOGEN Negative   LEUKOCYTESUR Negative     All pertinent labs within the past 24 hours have been reviewed.    Significant Imaging: I have reviewed and interpreted all pertinent imaging results/findings within the past 24 hours.     Imaging Results          X-Ray Chest PA And Lateral (Final result)  Result time 12/12/18 16:54:20    Final result by MARTINE Schnedier Sr., MD (12/12/18 16:54:20)                 Impression:      1. There is a persistent small right pleural effusion with associated atelectasis.  2. There is mild cardiomegaly.  3. There are mild degenerative changes in the thoracic spine.  .      Electronically signed by: Tee Schneider MD  Date:    12/12/2018  Time:    16:54             Narrative:    EXAMINATION:  XR CHEST PA AND LATERAL    CLINICAL HISTORY:  Chest Pain;    COMPARISON:  12/05/2018    FINDINGS:  There is a persistent small right pleural effusion with associated atelectasis.  There is no focal pulmonary infiltrate visualized in the left lung.  There is no pneumothorax.  There are mild degenerative changes in the thoracic spine.  There is mild cardiomegaly..                                 I have independently reviewed and interpreted the EKG.     I have independently reviewed all pertinent labs within the past 24 hours.    I have independently reviewed, visualized and interpreted all pertinent imaging results within the past 24 hours and discussed the findings with the ED physician, Dr. Deras.            Assessment/Plan:     * Pleural effusion on right    Right-sided pleural effusion.  Consult Pulmonary for thoracentesis in a.m..  Hold warfarin.  Keep NPO past midnight.  Supplemental oxygen to maintain saturations greater than 92%.  Patient is not home oxygen-dependent.       A-fib    Currently rate controlled.  Continue atenolol.  INR 2.1.  Will hold warfarin in anticipation for thoracentesis in a.m..       Essential hypertension    Well controlled at this time.  Continue home medications.  Monitor and make adjustments as needed       Thrombocytopenia    Platelets [id number].  No evidence of bleeding.  Repeat labs in a.m..       CKD (chronic kidney disease) stage 3, GFR 30-59 ml/min    Serum creatinine 1.4.  EGFR 48.         VTE Risk Mitigation (From admission, onward)        Ordered     Place sequential compression device  Until discontinued      12/12/18 1913             Merrill Gonzalez MD  Department of Hospital Medicine   [facility]

## 2018-12-13 NOTE — ASSESSMENT & PLAN NOTE
Right-sided pleural effusion.  Discussed with Dr. Wright and Dr Schaffer   Hold warfarin and plavix  Outpatient thoracentesis scheduled for 12/201/8

## 2018-12-13 NOTE — ASSESSMENT & PLAN NOTE
Room air O2 sat 85% at rest  Likely secondary to right pleural effusion, decompensated Diastolic CHF, and underlying COPD.   Will add IV lasix, po steroids, and neb txs  Cardiac echo  Will need outpatient thoracentesis after holding Plavix x 5 days and holding coumadin  PFTs as outpatient   Home oxygen ordered

## 2018-12-13 NOTE — PLAN OF CARE
Problem: Adult Inpatient Plan of Care  Goal: Plan of Care Review  Outcome: Ongoing (interventions implemented as appropriate)  POC reviewed with pt, verbalized understanding. Pt remained free from falls, fall precautions in place. Pt is a fib on monitor,70-90s. VS monitored. Blood glucose monitored. Pt independent and able to reposition self. Pt IV intact, saline locked. PRN pain medication given. Thoracentesis scheduled for 12/20/2018. Pt had abnormal ABGs today, asymptomatic. No other c/o at this time. Call bell and personal belongings within reach. Hourly rounding complete. Reminded to call for assistance. Will continue to monitor.

## 2018-12-13 NOTE — ASSESSMENT & PLAN NOTE
Right-sided pleural effusion.  Consult Pulmonary for thoracentesis in a.m..  Hold warfarin.  Keep NPO past midnight.  Supplemental oxygen to maintain saturations greater than 92%.  Patient is not home oxygen-dependent.

## 2018-12-13 NOTE — PLAN OF CARE
CM met with patient and daughter, Yarely Lind, at bedside to assess discharge needs. Patient lives at home alone with plans to move in with daughter in near future and is independent with needs. Patient ambulates safely independently and denies any recent falls. Oxygen scheduled to be delivered to bedside this afternoon. CM provided a transitional care folder, information on advanced directives, information on pharmacy bedside delivery, and discharge planning begins on admission with contact information for any needs/questions.    DC Plan: Home/selfcare    Transportation home at d/c: Daughter    Physical address (for oxygen concentrator delivery): [address] [place] [address]       12/13/18 6641   Discharge Assessment   Assessment Type Discharge Planning Assessment   Confirmed/corrected address and phone number on facesheet? Yes   Assessment information obtained from? Patient;Caregiver   Prior to hospitilization cognitive status: Alert/Oriented   Prior to hospitalization functional status: Independent   Current cognitive status: Alert/Oriented   Current Functional Status: Independent   Lives With alone  (Plans to move with daughter, Yarely Lind in near future. )   Able to Return to Prior Arrangements yes   Is patient able to care for self after discharge? Yes   Patient's perception of discharge disposition admitted as an inpatient   Readmission Within the Last 30 Days no previous admission in last 30 days   Patient currently being followed by outpatient case management? No   Patient currently receives any other outside agency services? No   Equipment Currently Used at Home walker, rolling;raised toilet;bath bench;wheelchair   Do you have any problems affording any of your prescribed medications? No   Is the patient taking medications as prescribed? yes   Does the patient have transportation home? Yes   Transportation Anticipated family or friend will provide   Does the patient receive services at the  Coumadin Clinic? No   Discharge Plan A Home   Patient/Family in Agreement with Plan yes

## 2018-12-13 NOTE — SUBJECTIVE & OBJECTIVE
Interval History: + SOB above baseline, cough with clear sputum, wheezing, and leg swelling      Review of Systems   Constitutional: Positive for fatigue. Negative for appetite change, chills, diaphoresis and fever.   HENT: Negative for congestion, nosebleeds, sore throat and trouble swallowing.    Eyes: Negative for pain, discharge and visual disturbance.   Respiratory: Positive for cough, shortness of breath and wheezing. Negative for apnea, chest tightness and stridor.    Cardiovascular: Positive for leg swelling. Negative for chest pain and palpitations.   Gastrointestinal: Negative for abdominal distention, abdominal pain, blood in stool, constipation, diarrhea, nausea and vomiting.   Endocrine: Negative for cold intolerance and heat intolerance.   Genitourinary: Negative for difficulty urinating, dysuria, flank pain, frequency and urgency.   Musculoskeletal: Negative for arthralgias, back pain, joint swelling, myalgias, neck pain and neck stiffness.   Skin: Negative for rash and wound.   Allergic/Immunologic: Negative for food allergies and immunocompromised state.   Neurological: Negative for dizziness, seizures, syncope, facial asymmetry, weakness, light-headedness and headaches.   Hematological: Negative for adenopathy.   Psychiatric/Behavioral: Negative for agitation, behavioral problems and confusion. The patient is not nervous/anxious.      Objective:     Vital Signs (Most Recent):  Temp: 96.8 °F (36 °C) (12/13/18 0721)  Pulse: 97 (12/13/18 1300)  Resp: 20 (12/13/18 1300)  BP: (!) 168/78 (12/13/18 0721)  SpO2: (!) 94 % (12/13/18 1300) Vital Signs (24h Range):  Temp:  [96.8 °F (36 °C)-97.9 °F (36.6 °C)] 96.8 °F (36 °C)  Pulse:  [66-97] 97  Resp:  [16-23] 20  SpO2:  [85 %-97 %] 94 %  BP: (148-186)/(63-87) 168/78     Weight: 78.7 kg (173 lb 8 oz)  Body mass index is 28 kg/m².  No intake or output data in the 24 hours ending 12/13/18 1336   Physical Exam   Constitutional: He is oriented to person, place, and  time. He appears well-developed and well-nourished. No distress.   HENT:   Head: Normocephalic and atraumatic.   Nose: Nose normal.   Mouth/Throat: Oropharynx is clear and moist.   Eyes: Conjunctivae and EOM are normal. No scleral icterus.   Neck: Normal range of motion. Neck supple.   Cardiovascular: Normal rate, normal heart sounds and intact distal pulses. Exam reveals no gallop and no friction rub.   No murmur heard.  Irregular    Pulmonary/Chest: Effort normal. No stridor. No respiratory distress. He has wheezes. He has rales. He exhibits no tenderness.   Diminished BS to bases   Conversational dyspnea    Abdominal: Soft. Bowel sounds are normal. He exhibits no distension. There is no tenderness. There is no rebound and no guarding.   Musculoskeletal: Normal range of motion. He exhibits no edema (+1 BLE edema ), tenderness or deformity.   Neurological: He is alert and oriented to person, place, and time. He has normal reflexes. No cranial nerve deficit. He exhibits normal muscle tone. Coordination normal.   Skin: Skin is warm and dry. No rash noted. He is not diaphoretic. No erythema. No pallor.   Psychiatric: He has a normal mood and affect. His behavior is normal. Thought content normal.   Nursing note and vitals reviewed.      Significant Labs:   BMP:   Recent Labs   Lab 12/13/18  0511   *      K 4.2      CO2 32*   BUN 21   CREATININE 1.3   CALCIUM 9.8     CBC:   Recent Labs   Lab 12/12/18  1647 12/13/18  0511   WBC 4.92 4.67   HGB 11.6* 11.6*   HCT 36.8* 37.7*   PLT 65* 69*     CMP:   Recent Labs   Lab 12/12/18  1647 12/13/18  0511    142   K 3.9 4.2   CL 99 101   CO2 30* 32*   * 169*   BUN 23 21   CREATININE 1.4 1.3   CALCIUM 9.9 9.8   PROT 7.2  --    ALBUMIN 4.0  --    BILITOT 0.6  --    ALKPHOS 51*  --    AST 24  --    ALT 23  --    ANIONGAP 9 9   EGFRNONAA 48* 53*     All pertinent labs within the past 24 hours have been reviewed.    Significant Imaging:  Imaging Results           X-Ray Chest PA And Lateral (Final result)  Result time 12/12/18 16:54:20    Final result by MARTINE Schneider Sr., MD (12/12/18 16:54:20)                 Impression:      1. There is a persistent small right pleural effusion with associated atelectasis.  2. There is mild cardiomegaly.  3. There are mild degenerative changes in the thoracic spine.  .      Electronically signed by: Tee Schneider MD  Date:    12/12/2018  Time:    16:54             Narrative:    EXAMINATION:  XR CHEST PA AND LATERAL    CLINICAL HISTORY:  Chest Pain;    COMPARISON:  12/05/2018    FINDINGS:  There is a persistent small right pleural effusion with associated atelectasis.  There is no focal pulmonary infiltrate visualized in the left lung.  There is no pneumothorax.  There are mild degenerative changes in the thoracic spine.  There is mild cardiomegaly..

## 2018-12-13 NOTE — NURSING
Patient assessed for diabetes educational needs following chart review  Daughter at bedside and assisted with info  They report he was diagnosed over 10 years ago and has attended diabetes education class in the past  He has a home glucose monitor and checks 2 times daily with averages 110-150  He is consistent with taking his med's  They do not identify any diabetes educational needs at this time and do not need literature

## 2018-12-13 NOTE — ASSESSMENT & PLAN NOTE
Currently rate controlled.  Continue atenolol.  INR 2.1.  Will hold warfarin in anticipation for thoracentesis.

## 2018-12-13 NOTE — HOSPITAL COURSE
The pt with Afib-on coumadin, HTN, DM, PAD s/p fem/pop bypass and stent, recent osteomyelitis to foot- completed Daptomycin was placed in observation for acute hypoxic respiratory failure and right sided pleural effusion. Pt was found to have a large right sided pleural effusion on CT chest from 11/30/18. He was sen as outpatient by Pulmonology and set up to have outpatient thoracentesis 12/20/18. The pt had worsening SOB and was placed in observation for the right sided pleural effusion. CXR on admit shows small-moderate sized right pleural effusion.   Pt has conversational dyspnea, PaO2 44.   Will add IV lasix for decompensated CHF and prednisone and Neb txs for COPD exacerbation  Pt will need to hold Plavix  for 5 days and Coumadin prior to thoracentesis.   Pt sx improved with previous tx  Pt was seen and examined at bedside . He was determined to be suitable for d/c

## 2018-12-13 NOTE — CONSULTS
Referral for home oxygen noted. CM spoke with Latasha Dyson, [facility]. Awaiting d/c orders. Oxygen will be delivered prior to d/c.

## 2018-12-13 NOTE — HPI
Mr. Stuart is a 78-year-old  male with PMH significant for A.Fib, HTN, T2DM, his has been having right-sided pleural effusion for few days, seen in the Pulmonary Clinic in scheduled to have a thoracentesis done as outpatient on 12/20/2018.  Patient is on warfarin for AFib, was advised to hold the warfarin and the Plavix for five days which he started during last night.  However this morning patient's shortness of breath got worse, he was unable to ambulate to his mailbox.  He called his pulmonologist Dr. Schaffer, who recommended patient go to the ER for admission for possible thoracentesis.  Patient is comfortably lying in bed at this time, in no distress.  Is not home oxygen-dependent.  Laboratory workup is unremarkable.  Chest x-ray reveals right-sided pleural effusion.  INR 2.1.  Platelets [id number].

## 2018-12-13 NOTE — ASSESSMENT & PLAN NOTE
120 pack year smoking history quit in 1994  Likely has COPD  Will add po prednisone and neb txs   Will need PFTs as outpatient

## 2018-12-13 NOTE — PROGRESS NOTES
Home Oxygen Evaluation    Date Performed: 12/13/2018    1) Patient's Home O2 Sat on room air, while at rest: 85        If O2 sats on room air at rest are 88% or below, patient qualifies. No additional testing needed. Document N/A in steps 2 and 3. If 89% or above, complete steps 2.      2) Patient's O2 Sat on room air while exercising: NA        If O2 sats on room air while exercising remain 89% or above patient does not qualify, no further testing needed Document N/A in step 3. If O2 sats on room air while exercising are 88% or below, continue to step 3.      3) Patient's O2 Sat while exercising on O2: NA at NA LPM         (Must show improvement from #2 for patients to qualify)    If O2 sats improve on oxygen, patient qualifies for portable oxygen. If not, the patient does not qualify.

## 2018-12-13 NOTE — NURSING
Pt arrive to unit via stretcher, pt ambulated from stretcher to bed without assistance. Made comfortable in bed and placed on heart monitor. Vitals and assessment completed. Educated on fall prevention and hourly rounding. No complaints at this time. Call bell and personal belongings within reach. Reminded to call for assistance. Will continue to monitor. Pt NPO @ 00, report received from FREDERICK Chao.

## 2018-12-13 NOTE — PLAN OF CARE
Problem: Adult Inpatient Plan of Care  Goal: Plan of Care Review  Outcome: Ongoing (interventions implemented as appropriate)  POC reviewed, verbalized understanding. Pt remained free from falls, fall precautions in place. Pt is afib on monitor, 70s-80s. VSS. No other c/o at this time. PIV intact. Call bell and personal belongings within reach. Pt NPA since midnight. Hourly rounding complete. Reminded to call for assistance. Will continue to monitor.

## 2018-12-13 NOTE — PROGRESS NOTES
[facility] Hospital Medicine  Progress Note    Patient Name: Raymond Stuart  MRN: [id number]  Patient Class: OP- Observation   Admission Date: 12/12/2018  Length of Stay: 0 days  Attending Physician: Yayo Ro, *  Primary Care Provider: JOHNATHON Cristobal Jr, MD        Subjective:     Principal Problem:Acute on chronic respiratory failure    HPI:  Mr. Stuart is a 78-year-old  male with PMH significant for A.Fib, HTN, T2DM, his has been having right-sided pleural effusion for few days, seen in the Pulmonary Clinic in scheduled to have a thoracentesis done as outpatient on 12/20/2018.  Patient is on warfarin for AFib, was advised to hold the warfarin and the Plavix for five days which he started during last night.  However this morning patient's shortness of breath got worse, he was unable to ambulate to his mailbox.  He called his pulmonologist Dr. Schaffer, who recommended patient go to the ER for admission for possible thoracentesis.  Patient is comfortably lying in bed at this time, in no distress.  Is not home oxygen-dependent.  Laboratory workup is unremarkable.  Chest x-ray reveals right-sided pleural effusion.  INR 2.1.  Platelets 99127.    Hospital Course:  The pt with Afib-on coumadin, HTN, DM, PAD s/p fem/pop bypass and stent, recent osteomyelitis to foot- completed Daptomycin was placed in observation for acute hypoxic respiratory failure and right sided pleural effusion. Pt was found to have a large right sided pleural effusion on CT chest from 11/30/18. He was sen as outpatient by Pulmonology and set up to have outpatient thoracentesis 12/20/18. The pt had worsening SOB and was placed in observation for the right sided pleural effusion. CXR on admit shows small-moderate sized right pleural effusion.   Pt has conversational dyspnea, PaO2 44.   Will add IV lasix for decompensated CHF and prednisone and Neb txs for COPD exacerbation  Pt will need to hold Plavix  for 5 days and  Coumadin prior to thoracentesis.     Interval History: + SOB above baseline, cough with clear sputum, wheezing, and leg swelling      Review of Systems   Constitutional: Positive for fatigue. Negative for appetite change, chills, diaphoresis and fever.   HENT: Negative for congestion, nosebleeds, sore throat and trouble swallowing.    Eyes: Negative for pain, discharge and visual disturbance.   Respiratory: Positive for cough, shortness of breath and wheezing. Negative for apnea, chest tightness and stridor.    Cardiovascular: Positive for leg swelling. Negative for chest pain and palpitations.   Gastrointestinal: Negative for abdominal distention, abdominal pain, blood in stool, constipation, diarrhea, nausea and vomiting.   Endocrine: Negative for cold intolerance and heat intolerance.   Genitourinary: Negative for difficulty urinating, dysuria, flank pain, frequency and urgency.   Musculoskeletal: Negative for arthralgias, back pain, joint swelling, myalgias, neck pain and neck stiffness.   Skin: Negative for rash and wound.   Allergic/Immunologic: Negative for food allergies and immunocompromised state.   Neurological: Negative for dizziness, seizures, syncope, facial asymmetry, weakness, light-headedness and headaches.   Hematological: Negative for adenopathy.   Psychiatric/Behavioral: Negative for agitation, behavioral problems and confusion. The patient is not nervous/anxious.      Objective:     Vital Signs (Most Recent):  Temp: 96.8 °F (36 °C) (12/13/18 0721)  Pulse: 97 (12/13/18 1300)  Resp: 20 (12/13/18 1300)  BP: (!) 168/78 (12/13/18 0721)  SpO2: (!) 94 % (12/13/18 1300) Vital Signs (24h Range):  Temp:  [96.8 °F (36 °C)-97.9 °F (36.6 °C)] 96.8 °F (36 °C)  Pulse:  [66-97] 97  Resp:  [16-23] 20  SpO2:  [85 %-97 %] 94 %  BP: (148-186)/(63-87) 168/78     Weight: 78.7 kg (173 lb 8 oz)  Body mass index is 28 kg/m².  No intake or output data in the 24 hours ending 12/13/18 1336   Physical Exam   Constitutional:  He is oriented to person, place, and time. He appears well-developed and well-nourished. No distress.   HENT:   Head: Normocephalic and atraumatic.   Nose: Nose normal.   Mouth/Throat: Oropharynx is clear and moist.   Eyes: Conjunctivae and EOM are normal. No scleral icterus.   Neck: Normal range of motion. Neck supple.   Cardiovascular: Normal rate, normal heart sounds and intact distal pulses. Exam reveals no gallop and no friction rub.   No murmur heard.  Irregular    Pulmonary/Chest: Effort normal. No stridor. No respiratory distress. He has wheezes. He has rales. He exhibits no tenderness.   Diminished BS to bases   Conversational dyspnea    Abdominal: Soft. Bowel sounds are normal. He exhibits no distension. There is no tenderness. There is no rebound and no guarding.   Musculoskeletal: Normal range of motion. He exhibits no edema (+1 BLE edema ), tenderness or deformity.   Neurological: He is alert and oriented to person, place, and time. He has normal reflexes. No cranial nerve deficit. He exhibits normal muscle tone. Coordination normal.   Skin: Skin is warm and dry. No rash noted. He is not diaphoretic. No erythema. No pallor.   Psychiatric: He has a normal mood and affect. His behavior is normal. Thought content normal.   Nursing note and vitals reviewed.      Significant Labs:   BMP:   Recent Labs   Lab 12/13/18  0511   *      K 4.2      CO2 32*   BUN 21   CREATININE 1.3   CALCIUM 9.8     CBC:   Recent Labs   Lab 12/12/18  1647 12/13/18  0511   WBC 4.92 4.67   HGB 11.6* 11.6*   HCT 36.8* 37.7*   PLT 65* 69*     CMP:   Recent Labs   Lab 12/12/18  1647 12/13/18  0511    142   K 3.9 4.2   CL 99 101   CO2 30* 32*   * 169*   BUN 23 21   CREATININE 1.4 1.3   CALCIUM 9.9 9.8   PROT 7.2  --    ALBUMIN 4.0  --    BILITOT 0.6  --    ALKPHOS 51*  --    AST 24  --    ALT 23  --    ANIONGAP 9 9   EGFRNONAA 48* 53*     All pertinent labs within the past 24 hours have been  reviewed.    Significant Imaging:  Imaging Results          X-Ray Chest PA And Lateral (Final result)  Result time 12/12/18 16:54:20    Final result by MARTINE Schneider Sr., MD (12/12/18 16:54:20)                 Impression:      1. There is a persistent small right pleural effusion with associated atelectasis.  2. There is mild cardiomegaly.  3. There are mild degenerative changes in the thoracic spine.  .      Electronically signed by: Tee Schneider MD  Date:    12/12/2018  Time:    16:54             Narrative:    EXAMINATION:  XR CHEST PA AND LATERAL    CLINICAL HISTORY:  Chest Pain;    COMPARISON:  12/05/2018    FINDINGS:  There is a persistent small right pleural effusion with associated atelectasis.  There is no focal pulmonary infiltrate visualized in the left lung.  There is no pneumothorax.  There are mild degenerative changes in the thoracic spine.  There is mild cardiomegaly..                              Assessment/Plan:      * Acute on chronic respiratory failure    Room air O2 sat 85% at rest  Likely secondary to right pleural effusion, decompensated Diastolic CHF, and underlying COPD.   Will add IV lasix, po steroids, and neb txs  Cardiac echo  Will need outpatient thoracentesis after holding Plavix x 5 days and holding coumadin  PFTs as outpatient   Home oxygen ordered        Diastolic CHF, acute on chronic    IV lasix  I/O  Weights          Bronchitis, acute    120 pack year smoking history quit in 1994  Likely has COPD  Will add po prednisone and neb txs   Will need PFTs as outpatient            Pleural effusion on right    Right-sided pleural effusion.  Discussed with Dr. Wright and Dr Schaffer   Hold warfarin and plavix  Outpatient thoracentesis scheduled for 12/201/8      Thrombocytopenia    Appears chronic but needs to follow up with heme/onc   No evidence of bleeding.  Repeat labs in a.m..       CKD (chronic kidney disease) stage 3, GFR 30-59 ml/min    Serum creatinine 1.4.  EGFR 48.        Type 2 diabetes mellitus with diabetic neuropathy    NISS  gabapentin        PVD (peripheral vascular disease)    S/p fem/pop and stents  Legs examined- no s/s of ischemia        A-fib    Currently rate controlled.  Continue atenolol.  INR 2.1.  Will hold warfarin in anticipation for thoracentesis.       Essential hypertension    BP elevated   Continue lisinopril and atenolol, lasix added   Monitor and make adjustments as needed         VTE Risk Mitigation (From admission, onward)        Ordered     Place sequential compression device  Until discontinued      12/12/18 1913              Imelda Kiser NP  Department of Hospital Medicine   [facility]

## 2018-12-13 NOTE — ASSESSMENT & PLAN NOTE
Well controlled at this time.  Continue home medications.  Monitor and make adjustments as needed

## 2018-12-14 VITALS
TEMPERATURE: 99 F | RESPIRATION RATE: 17 BRPM | WEIGHT: 239.44 LBS | BODY MASS INDEX: 38.48 KG/M2 | HEART RATE: 68 BPM | HEIGHT: 66 IN | OXYGEN SATURATION: 94 % | DIASTOLIC BLOOD PRESSURE: 62 MMHG | SYSTOLIC BLOOD PRESSURE: 146 MMHG

## 2018-12-14 PROBLEM — J44.0 COPD (CHRONIC OBSTRUCTIVE PULMONARY DISEASE) WITH ACUTE BRONCHITIS: Status: ACTIVE | Noted: 2018-12-13

## 2018-12-14 LAB
ALBUMIN SERPL BCP-MCNC: 4.1 G/DL
ALP SERPL-CCNC: 52 U/L
ALT SERPL W/O P-5'-P-CCNC: 20 U/L
ANION GAP SERPL CALC-SCNC: 11 MMOL/L
AST SERPL-CCNC: 19 U/L
BASOPHILS # BLD AUTO: 0 K/UL
BASOPHILS NFR BLD: 0 %
BILIRUB SERPL-MCNC: 0.7 MG/DL
BUN SERPL-MCNC: 27 MG/DL
CALCIUM SERPL-MCNC: 9.7 MG/DL
CHLORIDE SERPL-SCNC: 97 MMOL/L
CO2 SERPL-SCNC: 27 MMOL/L
CREAT SERPL-MCNC: 1.5 MG/DL
DIFFERENTIAL METHOD: ABNORMAL
EOSINOPHIL # BLD AUTO: 0 K/UL
EOSINOPHIL NFR BLD: 0 %
ERYTHROCYTE [DISTWIDTH] IN BLOOD BY AUTOMATED COUNT: 15.5 %
EST. GFR  (AFRICAN AMERICAN): 52 ML/MIN/1.73 M^2
EST. GFR  (NON AFRICAN AMERICAN): 45 ML/MIN/1.73 M^2
GLUCOSE SERPL-MCNC: 285 MG/DL
HCT VFR BLD AUTO: 38.5 %
HGB BLD-MCNC: 12.3 G/DL
LYMPHOCYTES # BLD AUTO: 1.1 K/UL
LYMPHOCYTES NFR BLD: 16.7 %
MCH RBC QN AUTO: 30.1 PG
MCHC RBC AUTO-ENTMCNC: 31.9 G/DL
MCV RBC AUTO: 94 FL
MONOCYTES # BLD AUTO: 0.6 K/UL
MONOCYTES NFR BLD: 8.4 %
NEUTROPHILS # BLD AUTO: 5.1 K/UL
NEUTROPHILS NFR BLD: 74.9 %
PLATELET # BLD AUTO: 72 K/UL
PMV BLD AUTO: 9.5 FL
POCT GLUCOSE: 274 MG/DL (ref 70–110)
POTASSIUM SERPL-SCNC: 4.1 MMOL/L
PROT SERPL-MCNC: 7.4 G/DL
RBC # BLD AUTO: 4.09 M/UL
SODIUM SERPL-SCNC: 135 MMOL/L
WBC # BLD AUTO: 6.78 K/UL

## 2018-12-14 PROCEDURE — 80053 COMPREHEN METABOLIC PANEL: CPT

## 2018-12-14 PROCEDURE — 25000003 PHARM REV CODE 250: Performed by: INTERNAL MEDICINE

## 2018-12-14 PROCEDURE — 94640 AIRWAY INHALATION TREATMENT: CPT

## 2018-12-14 PROCEDURE — 85025 COMPLETE CBC W/AUTO DIFF WBC: CPT

## 2018-12-14 PROCEDURE — 63600175 PHARM REV CODE 636 W HCPCS: Performed by: NURSE PRACTITIONER

## 2018-12-14 PROCEDURE — 25000003 PHARM REV CODE 250: Performed by: FAMILY MEDICINE

## 2018-12-14 PROCEDURE — 27000221 HC OXYGEN, UP TO 24 HOURS

## 2018-12-14 PROCEDURE — 36415 COLL VENOUS BLD VENIPUNCTURE: CPT

## 2018-12-14 PROCEDURE — 94761 N-INVAS EAR/PLS OXIMETRY MLT: CPT

## 2018-12-14 PROCEDURE — 25000242 PHARM REV CODE 250 ALT 637 W/ HCPCS: Performed by: INTERNAL MEDICINE

## 2018-12-14 PROCEDURE — 25000003 PHARM REV CODE 250: Performed by: NURSE PRACTITIONER

## 2018-12-14 PROCEDURE — 25000242 PHARM REV CODE 250 ALT 637 W/ HCPCS: Performed by: NURSE PRACTITIONER

## 2018-12-14 PROCEDURE — 99223 1ST HOSP IP/OBS HIGH 75: CPT | Mod: ,,, | Performed by: INTERNAL MEDICINE

## 2018-12-14 RX ORDER — IPRATROPIUM BROMIDE AND ALBUTEROL SULFATE 2.5; .5 MG/3ML; MG/3ML
3 SOLUTION RESPIRATORY (INHALATION) EVERY 4 HOURS PRN
Qty: 1 BOX | Refills: 0 | Status: SHIPPED | OUTPATIENT
Start: 2018-12-14 | End: 2019-12-14

## 2018-12-14 RX ORDER — FUROSEMIDE 20 MG/1
20 TABLET ORAL DAILY
Qty: 30 TABLET | Refills: 0 | Status: ON HOLD | OUTPATIENT
Start: 2018-12-14 | End: 2018-12-20

## 2018-12-14 RX ADMIN — LISINOPRIL 40 MG: 20 TABLET ORAL at 09:12

## 2018-12-14 RX ADMIN — ATENOLOL 100 MG: 50 TABLET ORAL at 09:12

## 2018-12-14 RX ADMIN — IPRATROPIUM BROMIDE AND ALBUTEROL SULFATE 3 ML: .5; 3 SOLUTION RESPIRATORY (INHALATION) at 07:12

## 2018-12-14 RX ADMIN — PREDNISONE 60 MG: 20 TABLET ORAL at 09:12

## 2018-12-14 RX ADMIN — INSULIN ASPART 3 UNITS: 100 INJECTION, SOLUTION INTRAVENOUS; SUBCUTANEOUS at 06:12

## 2018-12-14 RX ADMIN — GABAPENTIN 600 MG: 300 CAPSULE ORAL at 09:12

## 2018-12-14 RX ADMIN — FUROSEMIDE 40 MG: 10 INJECTION, SOLUTION INTRAVENOUS at 09:12

## 2018-12-14 RX ADMIN — BUDESONIDE 0.5 MG: 0.5 SUSPENSION RESPIRATORY (INHALATION) at 07:12

## 2018-12-14 RX ADMIN — PANTOPRAZOLE SODIUM 40 MG: 40 TABLET, DELAYED RELEASE ORAL at 09:12

## 2018-12-14 RX ADMIN — ARFORMOTEROL TARTRATE 15 MCG: 15 SOLUTION RESPIRATORY (INHALATION) at 07:12

## 2018-12-14 NOTE — ASSESSMENT & PLAN NOTE
Optimize CHF regimen.  Recommend referral to CHF clinic.  Preload and afterload reduction [ ATENOLOL, FUROSEMIDE, LISINOPRIL].  Daily weighing.  Dietary salt restriction.  Alcohol and tobacco avoidance.

## 2018-12-14 NOTE — ASSESSMENT & PLAN NOTE
Small pleural effusion.  He is on Plavix; Needs to be held for at least 5 days prior to invasive procedure. Verify with cardiology if he can be off plavix.     Recommend pursuing O/P thoracentesis as planned by Dr Guzman.

## 2018-12-14 NOTE — HPI
78 year old mle who  has a past medical history of HFpEF, A-fib, Diabetes mellitus, type 2, Hyperlipidemia, Hypertension, Irregular heartbeat, PVD (peripheral vascular disease), S/P femoral-popliteal bypass surgery (8/8/2014), and S/P peripheral artery angioplasty (8/8/2014) admitted with progressive dyspnea, pulmonary vascular congestion, elevated BNP  and small right sided pleural effusion.

## 2018-12-14 NOTE — PLAN OF CARE
Problem: Adult Inpatient Plan of Care  Goal: Plan of Care Review  Outcome: Ongoing (interventions implemented as appropriate)  POC reviewed, verbalized understanding. Pt remained free from falls, fall precautions in place. Pt is afib on monitor, 80s-110. VSS. No other c/o at this time. PIV intact. Call bell and personal belongings within reach. Hourly rounding complete. Reminded to call for assistance. Will continue to monitor.

## 2018-12-14 NOTE — HOSPITAL COURSE
Admitted to telemetry. Pulmonary consulted for possible thoracentesis. Treated with IV diuresis as well as standard AECOPD therapy.

## 2018-12-14 NOTE — ASSESSMENT & PLAN NOTE
Bronchodilators: Duo nebs, Formoterol, Budesonide       Antibiotics:Azithromycin 500mg PO daily X 5 days       Glucocorticoids: Oral prednisone ( taper dose)       Respiratory Support: Consider BIPAP QHS/ PRN.       Oxygen supplementation: Home O2 Evaluation       Smoking Cessation

## 2018-12-14 NOTE — CONSULTS
[facility] -   Pulmonology  Consult Note    Patient Name: Raymond Stuart  MRN: [id number]  Admission Date: 12/12/2018  Hospital Length of Stay: 1 days  Code Status: No Order  Attending Physician: Yayo Ro, *  Primary Care Provider: JOHNATHON Cristobal Jr, MD   Principal Problem: Acute on chronic respiratory failure      Subjective:     HPI:  78 year old mle who  has a past medical history of HFpEF, A-fib, Diabetes mellitus, type 2, Hyperlipidemia, Hypertension, Irregular heartbeat, PVD (peripheral vascular disease), S/P femoral-popliteal bypass surgery (8/8/2014), and S/P peripheral artery angioplasty (8/8/2014) admitted with progressive dyspnea, pulmonary vascular congestion, elevated BNP  and small right sided pleural effusion.     Past Medical History:   Diagnosis Date    A-fib     Diabetes mellitus, type 2     Hyperlipidemia     Hypertension     Irregular heartbeat     PVD (peripheral vascular disease)     S/P femoral-popliteal bypass surgery 8/8/2014    S/P peripheral artery angioplasty 8/8/2014       Past Surgical History:   Procedure Laterality Date    AMPUTATION- TOE Left 2/17/2017    Performed by [facility]illo D[facility] at [facility] OR    CARDIAC ELECTROPHYSIOLOGY MAPPING AND ABLATION      COLONOSCOPY N/A 1/9/2014    Performed by Yinka Clifford MD at [facility] ENDO    POPLITEAL ARTERY STENT  2013    RECTOPERITONEAL FISTULA CLOSURE      right leg bipass      TOE SURGERY      Joint release    TONSILLECTOMY         Review of patient's allergies indicates:   Allergen Reactions    Sulfamethoxazole-trimethoprim Hives    Sulfa (sulfonamide antibiotics)      Other reaction(s): Stomach upset    Bacitracin Rash    Sulfamethoxazole Rash    Trimethoprim Rash     Bactrim         Scheduled Meds:   albuterol-ipratropium  3 mL Nebulization Q6H    arformoterol  15 mcg Nebulization Q12H    atenolol  100 mg Oral Daily    budesonide  0.5 mg Nebulization Q12H    enoxaparin  40 mg Subcutaneous  Daily    furosemide  40 mg Intravenous BID    gabapentin  600 mg Oral BID    lisinopril  40 mg Oral Daily    pantoprazole  40 mg Oral Daily    predniSONE  60 mg Oral Daily     Continuous Infusions:  PRN Meds:.acetaminophen, albuterol-ipratropium, dextrose 50%, dextrose 50%, glucagon (human recombinant), glucose, glucose, guaifenesin 100 mg/5 ml, hydrALAZINE, insulin aspart U-100, ondansetron     Family History     None        Tobacco Use    Smoking status: Former Smoker     Packs/day: 4.00     Years: 30.00     Pack years: 120.00     Types: Cigarettes     Last attempt to quit: 1994     Years since quittin.9    Smokeless tobacco: Never Used   Substance and Sexual Activity    Alcohol use: No    Drug use: No    Sexual activity: Not on file         Review of Systems   Constitutional: Positive for fatigue. Negative for appetite change, chills, diaphoresis and fever.   HENT: Negative for congestion, nosebleeds, sore throat and trouble swallowing.    Eyes: Negative for pain, discharge and visual disturbance.   Respiratory: Positive for cough, shortness of breath and wheezing. Negative for apnea, chest tightness and stridor.    Cardiovascular: Positive for leg swelling. Negative for chest pain and palpitations.   Gastrointestinal: Negative for abdominal distention, abdominal pain, blood in stool, constipation, diarrhea, nausea and vomiting.   Endocrine: Negative for cold intolerance and heat intolerance.   Genitourinary: Negative for difficulty urinating, dysuria, flank pain, frequency and urgency.   Musculoskeletal: Negative for arthralgias, back pain, joint swelling, myalgias, neck pain and neck stiffness.   Skin: Negative for rash and wound.   Allergic/Immunologic: Negative for food allergies and immunocompromised state.   Neurological: Negative for dizziness, seizures, syncope, facial asymmetry, weakness, light-headedness and headaches.   Hematological: Negative for adenopathy.    Psychiatric/Behavioral: Negative for agitation, behavioral problems and confusion. The patient is not nervous/anxious.      Objective:     Vital Signs (Most Recent):  Temp: 98.6 °F (37 °C) (12/14/18 0731)  Pulse: 80 (12/14/18 0755)  Resp: 17 (12/14/18 0755)  BP: (!) 146/62 (12/14/18 0731)  SpO2: (!) 94 % (12/14/18 0755) Vital Signs (24h Range):  Temp:  [97.4 °F (36.3 °C)-99.2 °F (37.3 °C)] 98.6 °F (37 °C)  Pulse:  [] 80  Resp:  [16-20] 17  SpO2:  [92 %-96 %] 94 %  BP: (121-148)/(58-70) 146/62     Weight: 108.6 kg (239 lb 6.7 oz)  Body mass index is 38.64 kg/m².    No intake or output data in the 24 hours ending 12/14/18 0912    Physical Exam   Constitutional: He is oriented to person, place, and time. He appears well-developed and well-nourished. No distress.   HENT:   Head: Normocephalic and atraumatic.   Nose: Nose normal.   Mouth/Throat: Oropharynx is clear and moist.   Eyes: Conjunctivae and EOM are normal. No scleral icterus.   Neck: Normal range of motion. Neck supple.   Cardiovascular: Normal rate, normal heart sounds and intact distal pulses. Exam reveals no gallop and no friction rub.   No murmur heard.  Irregular    Pulmonary/Chest: Effort normal. No stridor. No respiratory distress. He has wheezes. He has rales. He exhibits no tenderness.   Diminished BS to bases   Conversational dyspnea    Abdominal: Soft. Bowel sounds are normal. He exhibits no distension. There is no tenderness. There is no rebound and no guarding.   Musculoskeletal: Normal range of motion. He exhibits no edema (+1 BLE edema ), tenderness or deformity.   Neurological: He is alert and oriented to person, place, and time. He has normal reflexes. No cranial nerve deficit. He exhibits normal muscle tone. Coordination normal.   Skin: Skin is warm and dry. No rash noted. He is not diaphoretic. No erythema. No pallor.   Psychiatric: He has a normal mood and affect. His behavior is normal. Thought content normal.   Nursing note and  vitals reviewed.      Vents:  Oxygen Concentration (%): 2 (12/14/18 0749)    Lines/Drains/Airways     Peripheral Intravenous Line                 Peripheral IV - Single Lumen 12/12/18 1640 Left Antecubital 1 day                Significant Labs:    CBC/Anemia Profile:  Recent Labs   Lab 12/12/18  1647 12/13/18  0511 12/14/18  0556   WBC 4.92 4.67 6.78   HGB 11.6* 11.6* 12.3*   HCT 36.8* 37.7* 38.5*   PLT 65* 69* 72*   MCV 94 95 94   RDW 15.5* 15.7* 15.5*        Chemistries:  Recent Labs   Lab 12/12/18  1647 12/13/18  0511 12/14/18  0556    142 135*   K 3.9 4.2 4.1   CL 99 101 97   CO2 30* 32* 27   BUN 23 21 27*   CREATININE 1.4 1.3 1.5*   CALCIUM 9.9 9.8 9.7   ALBUMIN 4.0  --  4.1   PROT 7.2  --  7.4   BILITOT 0.6  --  0.7   ALKPHOS 51*  --  52*   ALT 23  --  20   AST 24  --  19           Significant Imaging:     CXR: 12/12/18: There is a persistent small right pleural effusion with associated atelectasis.  There is no focal pulmonary infiltrate visualized in the left lung.  There is no pneumothorax.  There are mild degenerative changes in the thoracic spine.  There is mild cardiomegaly          ABG  Recent Labs   Lab 12/13/18  1149   PH 7.411   PO2 44*   PCO2 51.7*   HCO3 32.9*   BE 8     Assessment/Plan:     * Acute on chronic respiratory failure    Supplement oxygenation. Keep SAO2 >= 92%. BIPAP 10/5 QHS and PRN. Bronchodilators per orders     COPD (chronic obstructive pulmonary disease) with acute bronchitis           Bronchodilators: Duo nebs, Formoterol, Budesonide       Antibiotics:Azithromycin 500mg PO daily X 5 days       Glucocorticoids: Oral prednisone ( taper dose)       Respiratory Support: Consider BIPAP QHS/ PRN.       Oxygen supplementation: Home O2 Evaluation       Smoking Cessation       Diastolic CHF, acute on chronic    Optimize CHF regimen.  Recommend referral to CHF clinic.  Preload and afterload reduction [ ATENOLOL, FUROSEMIDE, LISINOPRIL].  Daily weighing.  Dietary salt  restriction.  Alcohol and tobacco avoidance.     Pleural effusion on right    Small pleural effusion.  He is on Plavix; Needs to be held for at least 5 days prior to invasive procedure. Verify with cardiology if he can be off plavix.     Recommend pursuing O/P thoracentesis as planned by Dr Guzman.            Thank you for your consult. I will follow-up with patient. Please contact us if you have any additional questions.     Gennaro Wright MD  Pulmonology  [facility]

## 2018-12-14 NOTE — NURSING
Patient informed about calling [facility] after leaving hospital. Patient and family voiced understanding. Patient taken to car in wheelchair with all personal belongings.

## 2018-12-14 NOTE — NURSING
Patient discharged per MD order. Discharge instructions and handout given to patient. Stressed the importance of making and keeping all follow up appointments. Patient verbalizes understanding and has no questions at this time. IVs discontinued, telemetry removed and returned to monitor tech. Patient awaiting nebulizer delivery.

## 2018-12-14 NOTE — SUBJECTIVE & OBJECTIVE
Past Medical History:   Diagnosis Date    A-fib     Diabetes mellitus, type 2     Hyperlipidemia     Hypertension     Irregular heartbeat     PVD (peripheral vascular disease)     S/P femoral-popliteal bypass surgery 2014    S/P peripheral artery angioplasty 2014       Past Surgical History:   Procedure Laterality Date    AMPUTATION- TOE Left 2017    Performed by Nic Castillo D[facility] at [facility] [place]    CARDIAC ELECTROPHYSIOLOGY MAPPING AND ABLATION      COLONOSCOPY N/A 2014    Performed by Yinka Clifford MD at [facility]    POPLITEAL ARTERY STENT  2013    RECTOPERITONEAL FISTULA CLOSURE      right leg bipass      TOE SURGERY      Joint release    TONSILLECTOMY         Review of patient's allergies indicates:   Allergen Reactions    Sulfamethoxazole-trimethoprim Hives    Sulfa (sulfonamide antibiotics)      Other reaction(s): Stomach upset    Bacitracin Rash    Sulfamethoxazole Rash    Trimethoprim Rash     Bactrim         Scheduled Meds:   albuterol-ipratropium  3 mL Nebulization Q6H    arformoterol  15 mcg Nebulization Q12H    atenolol  100 mg Oral Daily    budesonide  0.5 mg Nebulization Q12H    enoxaparin  40 mg Subcutaneous Daily    furosemide  40 mg Intravenous BID    gabapentin  600 mg Oral BID    lisinopril  40 mg Oral Daily    pantoprazole  40 mg Oral Daily    predniSONE  60 mg Oral Daily     Continuous Infusions:  PRN Meds:.acetaminophen, albuterol-ipratropium, dextrose 50%, dextrose 50%, glucagon (human recombinant), glucose, glucose, guaifenesin 100 mg/5 ml, hydrALAZINE, insulin aspart U-100, ondansetron     Family History     None        Tobacco Use    Smoking status: Former Smoker     Packs/day: 4.00     Years: 30.00     Pack years: 120.00     Types: Cigarettes     Last attempt to quit: 1994     Years since quittin.9    Smokeless tobacco: Never Used   Substance and Sexual Activity    Alcohol use: No    Drug use: No    Sexual activity: Not on file          Review of Systems   Constitutional: Positive for fatigue. Negative for appetite change, chills, diaphoresis and fever.   HENT: Negative for congestion, nosebleeds, sore throat and trouble swallowing.    Eyes: Negative for pain, discharge and visual disturbance.   Respiratory: Positive for cough, shortness of breath and wheezing. Negative for apnea, chest tightness and stridor.    Cardiovascular: Positive for leg swelling. Negative for chest pain and palpitations.   Gastrointestinal: Negative for abdominal distention, abdominal pain, blood in stool, constipation, diarrhea, nausea and vomiting.   Endocrine: Negative for cold intolerance and heat intolerance.   Genitourinary: Negative for difficulty urinating, dysuria, flank pain, frequency and urgency.   Musculoskeletal: Negative for arthralgias, back pain, joint swelling, myalgias, neck pain and neck stiffness.   Skin: Negative for rash and wound.   Allergic/Immunologic: Negative for food allergies and immunocompromised state.   Neurological: Negative for dizziness, seizures, syncope, facial asymmetry, weakness, light-headedness and headaches.   Hematological: Negative for adenopathy.   Psychiatric/Behavioral: Negative for agitation, behavioral problems and confusion. The patient is not nervous/anxious.      Objective:     Vital Signs (Most Recent):  Temp: 98.6 °F (37 °C) (12/14/18 0731)  Pulse: 80 (12/14/18 0755)  Resp: 17 (12/14/18 0755)  BP: (!) 146/62 (12/14/18 0731)  SpO2: (!) 94 % (12/14/18 0755) Vital Signs (24h Range):  Temp:  [97.4 °F (36.3 °C)-99.2 °F (37.3 °C)] 98.6 °F (37 °C)  Pulse:  [] 80  Resp:  [16-20] 17  SpO2:  [92 %-96 %] 94 %  BP: (121-148)/(58-70) 146/62     Weight: 108.6 kg (239 lb 6.7 oz)  Body mass index is 38.64 kg/m².    No intake or output data in the 24 hours ending 12/14/18 0912    Physical Exam   Constitutional: He is oriented to person, place, and time. He appears well-developed and well-nourished. No distress.   HENT:    Head: Normocephalic and atraumatic.   Nose: Nose normal.   Mouth/Throat: Oropharynx is clear and moist.   Eyes: Conjunctivae and EOM are normal. No scleral icterus.   Neck: Normal range of motion. Neck supple.   Cardiovascular: Normal rate, normal heart sounds and intact distal pulses. Exam reveals no gallop and no friction rub.   No murmur heard.  Irregular    Pulmonary/Chest: Effort normal. No stridor. No respiratory distress. He has wheezes. He has rales. He exhibits no tenderness.   Diminished BS to bases   Conversational dyspnea    Abdominal: Soft. Bowel sounds are normal. He exhibits no distension. There is no tenderness. There is no rebound and no guarding.   Musculoskeletal: Normal range of motion. He exhibits no edema (+1 BLE edema ), tenderness or deformity.   Neurological: He is alert and oriented to person, place, and time. He has normal reflexes. No cranial nerve deficit. He exhibits normal muscle tone. Coordination normal.   Skin: Skin is warm and dry. No rash noted. He is not diaphoretic. No erythema. No pallor.   Psychiatric: He has a normal mood and affect. His behavior is normal. Thought content normal.   Nursing note and vitals reviewed.      Vents:  Oxygen Concentration (%): 2 (12/14/18 0749)    Lines/Drains/Airways     Peripheral Intravenous Line                 Peripheral IV - Single Lumen 12/12/18 1640 Left Antecubital 1 day                Significant Labs:    CBC/Anemia Profile:  Recent Labs   Lab 12/12/18  1647 12/13/18  0511 12/14/18  0556   WBC 4.92 4.67 6.78   HGB 11.6* 11.6* 12.3*   HCT 36.8* 37.7* 38.5*   PLT 65* 69* 72*   MCV 94 95 94   RDW 15.5* 15.7* 15.5*        Chemistries:  Recent Labs   Lab 12/12/18  1647 12/13/18  0511 12/14/18  0556    142 135*   K 3.9 4.2 4.1   CL 99 101 97   CO2 30* 32* 27   BUN 23 21 27*   CREATININE 1.4 1.3 1.5*   CALCIUM 9.9 9.8 9.7   ALBUMIN 4.0  --  4.1   PROT 7.2  --  7.4   BILITOT 0.6  --  0.7   ALKPHOS 51*  --  52*   ALT 23  --  20   AST 24   --  19           Significant Imaging:     CXR: 12/12/18: There is a persistent small right pleural effusion with associated atelectasis.  There is no focal pulmonary infiltrate visualized in the left lung.  There is no pneumothorax.  There are mild degenerative changes in the thoracic spine.  There is mild cardiomegaly

## 2018-12-14 NOTE — PLAN OF CARE
Heath met with pt at bedside. Pt has O2 at bedside and needs nebulizer. Sw contacted [facility] at [phone]. Sw faxed Nebulizer order to [facility] at [phone]. Staff reports they will have nebulizer and concentrator delivered to the home. Staff reports to have pt contact them once he leaves hospital.

## 2018-12-16 NOTE — DISCHARGE SUMMARY
[facility] Hospital Medicine  Discharge Summary      Patient Name: Raymond Stuart  MRN: [id number]  Admission Date: 12/12/2018  Hospital Length of Stay: 1 days  Discharge Date and Time: 12/14/2018 11:30 AM  Attending Physician: No att. providers found   Discharging Provider: Yayo Ro MD  Primary Care Provider: JOHNATHON Cristobal Jr, MD      HPI:   Mr. Stuart is a 78-year-old  male with PMH significant for A.Fib, HTN, T2DM, his has been having right-sided pleural effusion for few days, seen in the Pulmonary Clinic in scheduled to have a thoracentesis done as outpatient on 12/20/2018.  Patient is on warfarin for AFib, was advised to hold the warfarin and the Plavix for five days which he started during last night.  However this morning patient's shortness of breath got worse, he was unable to ambulate to his mailbox.  He called his pulmonologist Dr. Schaffer, who recommended patient go to the ER for admission for possible thoracentesis.  Patient is comfortably lying in bed at this time, in no distress.  Is not home oxygen-dependent.  Laboratory workup is unremarkable.  Chest x-ray reveals right-sided pleural effusion.  INR 2.1.  Platelets 49311.    * No surgery found *      Hospital Course:   The pt with Afib-on coumadin, HTN, DM, PAD s/p fem/pop bypass and stent, recent osteomyelitis to foot- completed Daptomycin was placed in observation for acute hypoxic respiratory failure and right sided pleural effusion. Pt was found to have a large right sided pleural effusion on CT chest from 11/30/18. He was sen as outpatient by Pulmonology and set up to have outpatient thoracentesis 12/20/18. The pt had worsening SOB and was placed in observation for the right sided pleural effusion. CXR on admit shows small-moderate sized right pleural effusion.   Pt has conversational dyspnea, PaO2 44.   Will add IV lasix for decompensated CHF and prednisone and Neb txs for COPD exacerbation  Pt will need  to hold Plavix  for 5 days and Coumadin prior to thoracentesis.   Pt sx improved with previous tx  Pt was seen and examined at bedside . He was determined to be suitable for d/c      Consults:   Consults (From admission, onward)        Status Ordering Provider     Inpatient consult to Social Work  Once     Provider:  (Not yet assigned)    Completed KEYANA PENA          * Acute on chronic respiratory failure    Room air O2 sat 85% at rest  Likely secondary to right pleural effusion, decompensated Diastolic CHF, and underlying COPD.   Will add IV lasix, po steroids, and neb txs  Cardiac echo  Will need outpatient thoracentesis after holding Plavix x 5 days and holding coumadin  PFTs as outpatient   Home oxygen ordered        Diastolic CHF, acute on chronic    IV lasix  I/O  Weights          Thrombocytopenia    Appears chronic but needs to follow up with heme/onc   No evidence of bleeding.  Repeat labs in a.m..       Pleural effusion on right    Right-sided pleural effusion.  Discussed with Dr. Wright and Dr Schaffer   Hold warfarin and plavix  Outpatient thoracentesis scheduled for 12/201/8      CKD (chronic kidney disease) stage 3, GFR 30-59 ml/min    Serum creatinine 1.4.  EGFR 48.       Type 2 diabetes mellitus with diabetic neuropathy    NISS  gabapentin        PVD (peripheral vascular disease)    S/p fem/pop and stents  Legs examined- no s/s of ischemia        A-fib    Currently rate controlled.  Continue atenolol.  INR 2.1.  Will hold warfarin in anticipation for thoracentesis.       Essential hypertension    BP elevated   Continue lisinopril and atenolol, lasix added   Monitor and make adjustments as needed         Final Active Diagnoses:    Diagnosis Date Noted POA    PRINCIPAL PROBLEM:  Acute on chronic respiratory failure [J96.20] 12/13/2018 Yes    Diastolic CHF, acute on chronic [I50.33] 12/13/2018 Yes    COPD (chronic obstructive pulmonary disease) with acute bronchitis [J44.0, J20.9] 12/13/2018 Yes  "   Lymphadenopathy, mediastinal [R59.0] 12/13/2018 Yes    Respiratory failure, acute and chronic [J96.20] 12/13/2018 Yes    Pleural effusion on right [J90] 12/12/2018 Yes    Thrombocytopenia [D69.6] 12/12/2018 Yes    CKD (chronic kidney disease) stage 3, GFR 30-59 ml/min [N18.3] 11/30/2018 Yes    Type 2 diabetes mellitus with diabetic neuropathy [E11.40] 11/16/2015 Yes    A-fib [I48.91] 09/16/2013 Yes    PVD (peripheral vascular disease) [I73.9] 09/16/2013 Yes    Essential hypertension [I10]  Yes      Problems Resolved During this Admission:       Discharged Condition: stable    Disposition: Home or Self Care    Follow Up:  Follow-up Information     E Marcos Cristobal Jr, MD In 1 week.    Specialties:  Internal Medicine, Pediatrics  Contact information:  [address] [facility] [address] [place] [address] [phone]             Princess Schaffer MD In 6 days.    Specialty:  Pulmonary Disease  Contact information:  [address] [place] [id number] [phone]                 Patient Instructions:      OXYGEN FOR HOME USE     Order Specific Question Answer Comments   Liter Flow 2    Duration Continuous    Qualifying SpO2: 85    Testing done at: Rest    Device home concentrator with portable unit    Height: 5' 6" (1.676 m)    Weight: 78.7 kg (173 lb 8 oz)    Does patient have medical equipment at home? none    Alternative treatment measures have been tried or considered and deemed clinically ineffective. Yes      NEBULIZER FOR HOME USE     Order Specific Question Answer Comments   Height: 5' 6" (1.676 m)    Weight: 108.6 kg (239 lb 6.7 oz)    Does patient have medical equipment at home? nebulizer    Length of need (1-99 months): 99    Vendor: Other (use comments)    Expected Date of Delivery: 12/14/2018      Diet diabetic     Notify your health care provider if you experience any of the following:  temperature >100.4     Notify your health care provider if you experience any of the following:  persistent " nausea and vomiting or diarrhea     Notify your health care provider if you experience any of the following:  severe uncontrolled pain     Notify your health care provider if you experience any of the following:  redness, tenderness, or signs of infection (pain, swelling, redness, odor or green/yellow discharge around incision site)     Notify your health care provider if you experience any of the following:  difficulty breathing or increased cough     Notify your health care provider if you experience any of the following:  severe persistent headache     Notify your health care provider if you experience any of the following:  worsening rash     Notify your health care provider if you experience any of the following:  persistent dizziness, light-headedness, or visual disturbances     Notify your health care provider if you experience any of the following:  increased confusion or weakness     Notify your health care provider if you experience any of the following:     Activity as tolerated       Significant Diagnostic Studies: Labs: BMP: No results for input(s): GLU, NA, K, CL, CO2, BUN, CREATININE, CALCIUM, MG in the last 48 hours., CMP No results for input(s): NA, K, CL, CO2, GLU, BUN, CREATININE, CALCIUM, PROT, ALBUMIN, BILITOT, ALKPHOS, AST, ALT, ANIONGAP, ESTGFRAFRICA, EGFRNONAA in the last 48 hours. and CBC No results for input(s): WBC, HGB, HCT, PLT in the last 48 hours.    Pending Diagnostic Studies:     None         Medications:  Reconciled Home Medications:      Medication List      START taking these medications    albuterol-ipratropium 2.5 mg-0.5 mg/3 mL nebulizer solution  Commonly known as:  DUO-NEB  Take 3 mLs by nebulization every 4 (four) hours as needed for Wheezing. Rescue     furosemide 20 MG tablet  Commonly known as:  LASIX  Take 1 tablet (20 mg total) by mouth once daily.        CONTINUE taking these medications    albuterol 90 mcg/actuation inhaler  Commonly known as:  PROVENTIL/VENTOLIN  HFA  Inhale 1-2 puffs into the lungs every 4 (four) hours as needed for Wheezing or Shortness of Breath (cough).     atenolol 100 MG tablet  Commonly known as:  TENORMIN  TAKE 1 TABLET BY MOUTH ONCE DAILY     blood-glucose meter kit  To check BG 2 times daily, to use with insurance preferred meter     clopidogrel 75 mg tablet  Commonly known as:  PLAVIX  TAKE 1 TABLET BY MOUTH  DAILY     fenofibrate micronized 134 MG Cap  Commonly known as:  LOFIBRA  Take 1 capsule (134 mg total) by mouth once daily.     fluorouracil 5 % cream  Commonly known as:  EFUDEX     gabapentin 600 MG tablet  Commonly known as:  NEURONTIN  TAKE 2 TABLETS BY MOUTH TWO TIMES DAILY     glipizide-metformin 2.5-500 mg per tablet  Commonly known as:  METAGLIP  TAKE 2 TABLETS BY MOUTH  TWICE A DAY BEFORE MEALS IN THE MORNING AND EVENING     guaiFENesin 400 mg Tab  Commonly known as:  HUMIBID E  Take 400 mg by mouth 3 (three) times daily as needed.     imiquimod 5 % cream  Commonly known as:  ALDARA     * lancets Misc  To check BG 2 times daily, to use with insurance preferred meter     * ONETOUCH DELICA LANCETS 30 gauge Misc  Generic drug:  lancets  USE TWO TIMES DAILY AND AS  NEEDED     lisinopril-hydrochlorothiazide 20-12.5 mg per tablet  Commonly known as:  PRINZIDE,ZESTORETIC  TAKE 2 TABLETS BY MOUTH  ONCE DAILY     ONETOUCH ULTRA BLUE TEST STRIP Strp  Generic drug:  blood sugar diagnostic  USE TWO TIMES DAILY AND AS  NEEDED     rosuvastatin 40 MG Tab  Commonly known as:  CRESTOR  TAKE 1 TABLET BY MOUTH ONCE DAILY     warfarin 5 MG tablet  Commonly known as:  COUMADIN  TAKE 1 AND 1/2 TABLETS BY  MOUTH ON WEDNESDAY AND  SUNDAY AND 1 TABLET ALL  REMAINING DAYS AS DIRECTED  BY THE COUMADIN CLINIC.         * This list has 2 medication(s) that are the same as other medications prescribed for you. Read the directions carefully, and ask your doctor or other care provider to review them with you.                Indwelling Lines/Drains at time of  discharge:   Lines/Drains/Airways          None          Time spent on the discharge of patient: 35 minutes  Patient was seen and examined on the date of discharge and determined to be suitable for discharge.         Yayo Ro MD  Department of Hospital Medicine  [facility]

## 2018-12-17 ENCOUNTER — DOCUMENTATION ONLY (OUTPATIENT)
Dept: ENDOSCOPY | Facility: HOSPITAL | Age: 78
End: 2018-12-17

## 2018-12-17 ENCOUNTER — TELEPHONE (OUTPATIENT)
Dept: ENDOSCOPY | Facility: HOSPITAL | Age: 78
End: 2018-12-17

## 2018-12-18 ENCOUNTER — PATIENT OUTREACH (OUTPATIENT)
Dept: ADMINISTRATIVE | Facility: CLINIC | Age: 78
End: 2018-12-18

## 2018-12-18 ENCOUNTER — TELEPHONE (OUTPATIENT)
Dept: PULMONOLOGY | Facility: CLINIC | Age: 78
End: 2018-12-18

## 2018-12-18 NOTE — PATIENT INSTRUCTIONS
Fall Prevention  Falls often occur due to slipping, tripping or losing your balance. Millions of people fall every year and injure themselves. Here are ways to reduce your risk of falling again.  · Think about your fall, was there anything that caused your fall that can be fixed, removed, or replaced?  · Make your home safe by keeping walkways clear of objects you may trip over.  · Use non-slip pads under rugs. Do not use area rugs or small throw rugs.  · Use non-slip mats in bathtubs and showers.  · Install handrails and lights on staircases.  · Do not walk in poorly lit areas.  · Do not stand on chairs or wobbly ladders.  · Use caution when reaching overhead or looking upward. This position can cause a loss of balance.  · Be sure your shoes fit properly, have non-slip bottoms and are in good condition.   · Wear shoes both inside and out. Avoid going barefoot or wearing slippers.  · Be cautious when going up and down stairs, curbs, and when walking on uneven sidewalks.  · If your balance is poor, consider using a cane or walker.  · If your fall was related to alcohol use, stop or limit alcohol intake.   · If your fall was related to use of sleeping medicines, talk to your doctor about this. You may need to reduce your dosage at bedtime if you awaken during the night to go to the bathroom.    · To reduce the need for nighttime bathroom trips:  ¨ Avoid drinking fluids for several hours before going to bed  ¨ Empty your bladder before going to bed  ¨ Men can keep a urinal at the bedside  · Stay as active as you can. Balance, flexibility, strength, and endurance all come from exercise. They all play a role in preventing falls. Ask your healthcare provider which types of activity are right for you.  · Get your vision checked on a regular basis.  · If you have pets, know where they are before you stand up or walk so you don't trip over them.  · Use night lights.  Date Last Reviewed: 11/5/2015  © 4214-2968 [employer]. [address] [place] [address], [place] [address]. All rights reserved. This information is not intended as a substitute for professional medical care. Always follow your healthcare professional's instructions.

## 2018-12-18 NOTE — PHYSICIAN QUERY
PT Name: Raymond Stuart  MR #: [id number]    Physician Query Form - Atrial Fibrillation Specificity     CDS/: Ruthie Carvajla RN CDI           Contact information: [email or web address]     This form is a permanent document in the medical record.     Query Date: December 18, 2018    By submitting this query, we are merely seeking further clarification of documentation. Please utilize your independent clinical judgment when addressing the question(s) below.    The medical record contains the following:   Indicators     Supporting Clinical Findings Location in Medical Record   X Atrial Fibrillation  Patient is on warfarin for AFib   Hospital medicine H&P  12/12 800 pm   X EKG results Atrial fibrillation 12/12 515 pm   X Medication Enoxaparin 40 mg Sub Q daily 12/13 1700 - 12/14 1331 Medication sheets   X Treatment his has been having right-sided pleural effusion for few days, seen in the Pulmonary Clinic in scheduled to have a thoracentesis done as outpatient on 12/20/2018.  Patient is on warfarin for AFib, was advised to hold the warfarin and the Plavix for five days which he started during last night.   Hospital medicine H&P  12/12 800 pm       Provider, please further specify the Atrial Fibrillation diagnosis.    [ x ] Chronic   [  ] Paroxysmal   [  ] Permanent   [  ] Persistent   [  ] Other (please specify):   [  ] Clinically Undetermined       Please document in your progress notes daily for the duration of treatment until resolved, and include in your discharge summary.

## 2018-12-18 NOTE — TELEPHONE ENCOUNTER
Called patient. Daughter states that her dad has been off his plavix for the past few days. She confirmed time and date for procedure on 12/20. She had no further questions. ----- Message from Nitza Quintana sent at 12/18/2018  7:2

## 2018-12-18 NOTE — PLAN OF CARE
12/18/18 1019   Final Note   Assessment Type Final Discharge Note   Anticipated Discharge Disposition Home   Right Care Referral Info   Post Acute Recommendation No Care

## 2018-12-18 NOTE — PHYSICIAN QUERY
PT Name: Raymond Stuart  MR #: [id number]    Physician Query Form - Respiratory Condition Clarification      CDS/: Ruthie Carvajal RN CDI             Contact information: [email or web address]    This form is a permanent document in the medical record.    Query Date: December 18, 2018    By submitting this query, we are merely seeking further clarification of documentation. Please utilize your independent clinical judgment when addressing the question(s) below.    The Medical record contains the following   Indicators   Supporting Clinical Findings Location in Medical Record   X   SOB, WHITEHEAD, Wheezing, Productive Cough, Use of Accessory Muscles, etc. .  However this morning patient's shortness of breath got worse, he was unable to ambulate to his mailbox.       H&P Hospital medicine  12/12 800 pm   X   Acute/Chronic Illness Right-sided pleural effusion.  A Fib  HTN  Thrombocytopenia  CKD stage 3  H&P Hospital medicine  12/12 800 pm   X   Radiology Findings 1. There is a persistent small right pleural effusion with associated atelectasis.  2. There is mild cardiomegaly.  3. There are mild degenerative changes in the thoracic spine.   Chest xray 12/12   X   Respiratory Distress or Failure Acute on chronic respiratory failure    Room air O2 sat 85% at rest  Likely secondary to right pleural effusion, decompensated Diastolic CHF, and underlying COPD.     Hospital medicine  12/13 815 am      Hypoxia or Hypercapnia     X   RR         ABGs         O2 sat RR  12/12 1555 - 18            1718 - 20            1946 - 23  O2 SATS  12/12 1555 - 89%            1718 - 97%            1931 - 97%            2051 - 93%     Blood gas - 12/13/2018 11:49  POC PH: 7.411  POC PCO2: 51.7 (H)  POC PO2: 44 (LL)  POC BE: 8  POC HCO3: 32.9 (H)  POC SATURATED O2: 80 (L)  FiO2: 21  Sample: ARTERIAL Vital signs                    Docked Lab results   X   XBiPAP/Intubation Supplement oxygenation. Keep SAO2 >= 92%. BIPAP 10/5 QHS and PRN.  Bronchodilators per orders   Pulmonary medicine note  12/14 925 am   X   Supplemental O2 Supplemental oxygen to maintain saturations greater than 92%. H&P Hospital medicine  12/12 800 pm   X   Home O2, Oxygen Dependence Patient is not home oxygen-dependent. H&P Hospital medicine  12/12 800 pm   X   Treatment Will add IV lasix, po steroids, and neb txs  Cardiac echo  Will need outpatient thoracentesis after holding Plavix x 5 days and holding coumadin  PFTs as outpatient   Home oxygen ordered  Hospital medicine  12/13 815 am      Other       Respiratory failure can be acute, chronic or both. It is generally further specified as hypoxic, hypercapnic or both. Lastly, it is important to identify an etiology, if known or suspected.   References:: https://www.acphospitalist.org/archives/2013/10/coding; htm; http://[employer]/acute-respiratory-failure-know    The clinical guidelines noted below are only system guidelines, and do not replace the providers clinical judgment.    Provider, please specify diagnosis or diagnoses associated with above clinical findings.   [x   ] Acute Respiratory Failure with Hypoxia - ABG pO2 < 60 mmHg or O2 sat of 88% on RA and respiratory symptoms documented   [   ] Acute Respiratory Failure with Hypercapnia - pCO2 > 50 mmHg with pH < 7.35 and respiratory symptoms documented   [   ] Acute Respiratory Failure with Hypoxia and Hypercapnia - Hypoxia: ABG pO2 < 60 mmHg or O2 sat of 88% on RA and Hypercapnia: pCO2 > 50 mmHg with pH < 7.35 and respiratory symptoms documented     [   ] Acute and (on) Chronic Respiratory Failure with Hypoxia - pO2 >10 mmHg below baseline OR SpO2 < 91% on usual home O2 OR O2 ? 2L/min over baseline home O2    [   ] Acute and (on) Chronic Respiratory Failure with Hypercapnia - pCO2 >10 mmHg over baseline and pH < 7.35   [   ] Acute and (on) Chronic Respiratory Failure with Hypoxia and Hypercapnia - Hypoxic: pO2 >10 mmHg below baseline OR SpO2 < 91% on usual home  O2 OR O2 ? 2L/min over baseline home O2 and Hypercapnic:  pCO2 >10 mmHg over baseline and pH < 7.35      [   ] Acute Respiratory Insufficiency - Generally describes less severe respiratory symptoms and measurements (pO2, SpO2, pH, and pCO2) NOT meeting criteria for respiratory failure     [   ] Acute Respiratory Distress - Generally describes less severe respiratory symptoms (tachypnea, in respiratory distress, increased work of breathing, unable to speak in complete sentences, labored breathing, use of accessory muscles, RR> 24, cyanosis, dyspnea, wheezing, stridor, lethargy) without sufficient measurements (pO2, SpO2, pH, and pCO2) to meet criteria for respiratory failure    [   ] Other Respiratory Diagnosis (please specify): _________________________________   [   ]  Clinically Undetermined       Please document in your progress notes daily for the duration of treatment until resolved and include in your discharge summary.

## 2018-12-20 ENCOUNTER — HOSPITAL ENCOUNTER (OUTPATIENT)
Facility: HOSPITAL | Age: 78
Discharge: HOME | End: 2018-12-20
Attending: INTERNAL MEDICINE | Admitting: INTERNAL MEDICINE
Payer: MEDICARE

## 2018-12-20 VITALS
SYSTOLIC BLOOD PRESSURE: 137 MMHG | DIASTOLIC BLOOD PRESSURE: 71 MMHG | OXYGEN SATURATION: 96 % | HEART RATE: 80 BPM | RESPIRATION RATE: 18 BRPM | TEMPERATURE: 98 F

## 2018-12-20 DIAGNOSIS — J90 PLEURAL EFFUSION ON RIGHT: ICD-10-CM

## 2018-12-20 LAB
ALBUMIN FLD-MCNC: 3.1 G/DL
APPEARANCE FLD: CLEAR
BODY FLD TYPE: NORMAL
COLOR FLD: YELLOW
GLUCOSE SERPL-MCNC: 59 MG/DL
INR PPP: 1.1
LDH SERPL L TO P-CCNC: 180 U/L
LYMPHOCYTES NFR FLD MANUAL: 7 %
MONOS+MACROS NFR FLD MANUAL: 91 %
NEUTROPHILS NFR FLD MANUAL: 2 %
PROT SERPL-MCNC: 7.2 G/DL
PROTHROMBIN TIME: 12.1 SEC
SPECIMEN SOURCE: NORMAL
WBC # FLD: 287 /CU MM

## 2018-12-20 PROCEDURE — 89051 BODY FLUID CELL COUNT: CPT

## 2018-12-20 PROCEDURE — 82042 OTHER SOURCE ALBUMIN QUAN EA: CPT

## 2018-12-20 PROCEDURE — 87070 CULTURE OTHR SPECIMN AEROBIC: CPT

## 2018-12-20 PROCEDURE — 87205 SMEAR GRAM STAIN: CPT

## 2018-12-20 PROCEDURE — 88112 CYTOPATH CELL ENHANCE TECH: CPT | Mod: 26,,, | Performed by: PATHOLOGY

## 2018-12-20 PROCEDURE — 32555 ASPIRATE PLEURA W/ IMAGING: CPT | Mod: RT,,, | Performed by: INTERNAL MEDICINE

## 2018-12-20 PROCEDURE — 82947 ASSAY GLUCOSE BLOOD QUANT: CPT | Mod: 59

## 2018-12-20 PROCEDURE — 88305 TISSUE EXAM BY PATHOLOGIST: CPT | Mod: 26,,, | Performed by: PATHOLOGY

## 2018-12-20 PROCEDURE — 88112 CYTOPATH CELL ENHANCE TECH: CPT | Performed by: PATHOLOGY

## 2018-12-20 PROCEDURE — 87116 MYCOBACTERIA CULTURE: CPT

## 2018-12-20 PROCEDURE — 85610 PROTHROMBIN TIME: CPT

## 2018-12-20 PROCEDURE — 83615 LACTATE (LD) (LDH) ENZYME: CPT | Mod: 91

## 2018-12-20 PROCEDURE — 83615 LACTATE (LD) (LDH) ENZYME: CPT

## 2018-12-20 PROCEDURE — 84157 ASSAY OF PROTEIN OTHER: CPT

## 2018-12-20 PROCEDURE — 84155 ASSAY OF PROTEIN SERUM: CPT

## 2018-12-20 PROCEDURE — 87206 SMEAR FLUORESCENT/ACID STAI: CPT

## 2018-12-20 PROCEDURE — 82945 GLUCOSE OTHER FLUID: CPT

## 2018-12-20 PROCEDURE — 88305 TISSUE EXAM BY PATHOLOGIST: CPT | Performed by: PATHOLOGY

## 2018-12-20 PROCEDURE — 32555 ASPIRATE PLEURA W/ IMAGING: CPT | Mod: RT | Performed by: INTERNAL MEDICINE

## 2018-12-20 RX ORDER — LIDOCAINE HYDROCHLORIDE 10 MG/ML
1 INJECTION, SOLUTION EPIDURAL; INFILTRATION; INTRACAUDAL; PERINEURAL ONCE
Status: DISCONTINUED | OUTPATIENT
Start: 2018-12-20 | End: 2018-12-20 | Stop reason: HOSPADM

## 2018-12-20 RX ORDER — WARFARIN SODIUM 5 MG/1
5 TABLET ORAL DAILY
Qty: 30 TABLET | Refills: 11 | Status: SHIPPED | OUTPATIENT
Start: 2018-12-20 | End: 2019-12-20

## 2018-12-20 RX ORDER — FUROSEMIDE 20 MG/1
20 TABLET ORAL DAILY
Qty: 30 TABLET | Refills: 11 | Status: SHIPPED | OUTPATIENT
Start: 2018-12-20 | End: 2019-12-20

## 2018-12-20 NOTE — DISCHARGE SUMMARY
[facility] -   Pulmonology  Discharge Summary      Patient Name: Raymond Stuart  MRN: [id number]  Admission Date: 12/20/2018  Hospital Length of Stay: 0 days  Discharge Date and Time:  12/20/2018 2:36 PM  Attending Physician: Princess Schaffer MD   Discharging Provider: Princess Schaffer MD  Primary Care Provider: JOHNATHON Cristobal Jr, MD    HPI:  Right side pleural effusion sp US thoracentesis   Procedure(s) (LRB):  THORACENTESIS (Right)    Indwelling Lines/Drains at Time of Discharge:   Lines/Drains/Airways          None          Hospital Course:  No notes on file        Significant Labs:  INR 1.1    ABG  No results for input(s): PH, PO2, PCO2, HCO3, BE in the last 168 hours.    Significant Imaging:  CXR: I have reviewed all pertinent results/findings within the past 24 hours and my personal findings are:  Right side pleural effusion   U/S: I have reviewed all pertinent results/findings within the past 24 hours and my personal findings are:  Right side pleural effusion     Pending Diagnostic Studies:     Procedure Component Value Units Date/Time    US Chest Mediastinum [[id number]]     Order Status:  Sent Lab Status:  No result     X-Ray Chest AP Portable [[id number]]     Order Status:  Sent Lab Status:  No result         Final Active Diagnoses:    Diagnosis Date Noted POA    Pleural effusion on right [J90] 12/12/2018 Yes      Problems Resolved During this Admission:       Discharged Condition: good    Disposition:     Follow Up:  Follow-up Information     Princess Schaffer MD On 2/12/2019.    Specialty:  Pulmonary Disease  Contact information:  [address] [place] [address] DR Sumit KELLER [id number]  [phone]             Princess Schaffer MD.    Specialty:  Pulmonary Disease  Contact information:  [address] [place] [address] DR Sumit KELLER [id number]  [phone]                 Patient Instructions:   No discharge procedures on file.  Medications:  Reconciled Home Medications:      Medication List      CHANGE how you  take these medications    * warfarin 5 MG tablet  Commonly known as:  COUMADIN  TAKE 1 AND 1/2 TABLETS BY  MOUTH ON WEDNESDAY AND  SUNDAY AND 1 TABLET ALL  REMAINING DAYS AS DIRECTED  BY THE COUMADIN CLINIC.  What changed:  Another medication with the same name was added. Make sure you understand how and when to take each.     * warfarin 5 MG tablet  Commonly known as:  COUMADIN  Take 1 tablet (5 mg total) by mouth Daily.  What changed:  You were already taking a medication with the same name, and this prescription was added. Make sure you understand how and when to take each.         * This list has 2 medication(s) that are the same as other medications prescribed for you. Read the directions carefully, and ask your doctor or other care provider to review them with you.            CONTINUE taking these medications    albuterol 90 mcg/actuation inhaler  Commonly known as:  PROVENTIL/VENTOLIN HFA  Inhale 1-2 puffs into the lungs every 4 (four) hours as needed for Wheezing or Shortness of Breath (cough).     albuterol-ipratropium 2.5 mg-0.5 mg/3 mL nebulizer solution  Commonly known as:  DUO-NEB  Take 3 mLs by nebulization every 4 (four) hours as needed for Wheezing. Rescue     atenolol 100 MG tablet  Commonly known as:  TENORMIN  TAKE 1 TABLET BY MOUTH ONCE DAILY     blood-glucose meter kit  To check BG 2 times daily, to use with insurance preferred meter     clopidogrel 75 mg tablet  Commonly known as:  PLAVIX  TAKE 1 TABLET BY MOUTH  DAILY     fenofibrate micronized 134 MG Cap  Commonly known as:  LOFIBRA  Take 1 capsule (134 mg total) by mouth once daily.     fluorouracil 5 % cream  Commonly known as:  EFUDEX     furosemide 20 MG tablet  Commonly known as:  LASIX  Take 1 tablet (20 mg total) by mouth once daily.     gabapentin 600 MG tablet  Commonly known as:  NEURONTIN  TAKE 2 TABLETS BY MOUTH TWO TIMES DAILY     glipizide-metformin 2.5-500 mg per tablet  Commonly known as:  METAGLIP  TAKE 2 TABLETS BY MOUTH   TWICE A DAY BEFORE MEALS IN THE MORNING AND EVENING     guaiFENesin 400 mg Tab  Commonly known as:  HUMIBID E  Take 400 mg by mouth 3 (three) times daily as needed.     imiquimod 5 % cream  Commonly known as:  ALDARA     * lancets Misc  To check BG 2 times daily, to use with insurance preferred meter     * ONETOUCH DELICA LANCETS 30 gauge Misc  Generic drug:  lancets  USE TWO TIMES DAILY AND AS  NEEDED     lisinopril-hydrochlorothiazide 20-12.5 mg per tablet  Commonly known as:  PRINZIDE,ZESTORETIC  TAKE 2 TABLETS BY MOUTH  ONCE DAILY     ONETOUCH ULTRA BLUE TEST STRIP Strp  Generic drug:  blood sugar diagnostic  USE TWO TIMES DAILY AND AS  NEEDED     rosuvastatin 40 MG Tab  Commonly known as:  CRESTOR  TAKE 1 TABLET BY MOUTH ONCE DAILY         * This list has 2 medication(s) that are the same as other medications prescribed for you. Read the directions carefully, and ask your doctor or other care provider to review them with you.                Princess Schaffer MD  Pulmonology  [facility]

## 2018-12-20 NOTE — CARE UPDATE
Pt appointment at 1400, patient was told to show up for 099 for blood work. Blood work drawn, patient pre-op done. Patient left to run errands and will return at 1200.

## 2018-12-20 NOTE — OP NOTE
Date of Procedure: 12/20/2018      Procedure: Thoracentesis     Indications: Therapeutic     Pre-Operative Diagnosis: Right side effusion      Post-Operative Diagnosis: normal     Anesthesia: local     Technical Procedures Used: US guidance over the needle cath      Description of the Findings of the Procedure: moderate effusion on US right chest      Consent: Informed consent was obtained. Risks of the procedure were discussed including: infection, bleeding, pain, pneumothorax.     Under sterile conditions the patient was positioned. Chloraprep solution and sterile drapes were utilized. 5 cc 1% plain lidocaine was used to anesthetize between the rib space after localized under ultrasound. Fluid was obtained after catheter inserted without  difficulty and suction applied with minimal blood loss.  A dressing was applied to the wound and wound care instructions were provided.      1060 ml of serous pleural fluid was obtained. A sample was sent to Pathology for cytogenetics, flow, and cell counts, as well as for infection analysis.     Plan:     A follow up chest x-ray was ordered. Yes  Tylenol 650 mg. for pain.     Significant Surgical Tasks Conducted by the Assistant(s), if Applicable:     Complications: None; patient tolerated the procedure well.     Estimated Blood Loss (EBL): None     Attestation: I performed the procedure.

## 2018-12-20 NOTE — INTERVAL H&P NOTE
The patient has been examined and the H&P has been reviewed:    I concur with the findings and no changes have occurred since H&P was written.    Anesthesia/Surgery risks, benefits and alternative options discussed and understood by patient/family.  INR 1.1        There are no hospital problems to display for this patient.

## 2018-12-20 NOTE — DISCHARGE INSTRUCTIONS
Discharge Instructions for Thoracentesis  Thoracentesis is a procedure that removes extra fluid (pleural effusion) from the pleural space. This space is between the outside surface of the lungs (pleura) and the chest wall. The procedure may be done to take a sample of the fluid for testing to help find the cause. Or it may be done to drain the extra fluid if you are having trouble breathing.  Home care  · You may have some pain after the procedure. Your doctor can prescribe or recommend pain medicine for you to take at home, if needed. Take these exactly as directed. If you stopped taking other medicines before the procedure, ask your doctor when you can start them again.  · Take it easy for 48 hours after the procedure. Don't do anything active until your doctor says its OK.  · Don't do strenuous activities, such as lifting, until your doctor says its OK.  · You will have a small bandage over the puncture site. You may remove the bandage in 24 hours.  · Check the puncture site for the signs of infection listed below.  Follow-up  Make a follow-up appointment with your doctor as directed. During your follow-up visit, your doctor will check your healing. Be sure to let your doctor know how you are feeling.  When to call your doctor  Call your doctor right away if you have any of the following:  · Coughing up blood  · Chest pain. If chest pain suddenly gets worse, it may be an emergency.  · Shortness of breath  · Fever of 100.4°F (38°C) or higher, or as directed by your healthcare provider  · Pain that doesn't get better after taking pain medicine  · Signs of infection at the puncture site. These include increased pain, redness, swelling, or warmth.  · Fluid draining from the puncture site   Date Last Reviewed: 10/1/2016  © 8237-9361 [employer]. [address] [place] [address], [place] [address]. All rights reserved. This information is not intended as a substitute for professional medical care. Always follow  your healthcare professional's instructions.

## 2018-12-20 NOTE — PLAN OF CARE
Waiting to hear from md on chest x-ray clearing.  Patient resting comfortably.  No needs at this time.  Will continue to monitor.

## 2018-12-20 NOTE — BRIEF OP NOTE
[facility]  Thoracentesis  Procedure Note    SUMMARY     Date of Procedure: 12/20/2018     Procedure: Thoracentesis    Indications: Therapeutic    Pre-Operative Diagnosis: Right side effusion     Post-Operative Diagnosis: normal    Anesthesia: local    Technical Procedures Used: US guidance over the needle cath     Description of the Findings of the Procedure: moderate effusion on US right chest     Consent: Informed consent was obtained. Risks of the procedure were discussed including: infection, bleeding, pain, pneumothorax.    Under sterile conditions the patient was positioned. Chloraprep solution and sterile drapes were utilized. 5 cc 1% plain lidocaine was used to anesthetize between the rib space after localized under ultrasound. Fluid was obtained after catheter inserted without  difficulty and suction applied with minimal blood loss.  A dressing was applied to the wound and wound care instructions were provided.     1060 ml of serous pleural fluid was obtained. A sample was sent to Pathology for cytogenetics, flow, and cell counts, as well as for infection analysis.    Plan:    A follow up chest x-ray was ordered. Yes. Personally reviewed no PTX   Tylenol 650 mg. for pain.    Significant Surgical Tasks Conducted by the Assistant(s), if Applicable:    Complications: None; patient tolerated the procedure well.    Estimated Blood Loss (EBL): None    Attestation: I performed the procedure.

## 2018-12-21 LAB — PATH INTERP FLD-IMP: NORMAL

## 2018-12-23 LAB
GLUCOSE FLD-MCNC: 117 MG/DL
LDH FLD L TO P-CCNC: 142 U/L
PROT FLD-MCNC: 4.7 G/DL
SPECIMEN SOURCE: NORMAL

## 2018-12-26 ENCOUNTER — PATIENT MESSAGE (OUTPATIENT)
Dept: PULMONOLOGY | Facility: CLINIC | Age: 78
End: 2018-12-26

## 2018-12-26 PROBLEM — I50.32 CHRONIC DIASTOLIC HEART FAILURE: Status: ACTIVE | Noted: 2018-12-13

## 2018-12-26 LAB
BACTERIA FLD AEROBE CULT: NO GROWTH
GRAM STN SPEC: NORMAL

## 2018-12-27 ENCOUNTER — TELEPHONE (OUTPATIENT)
Dept: PULMONOLOGY | Facility: HOSPITAL | Age: 78
End: 2018-12-27

## 2019-01-07 ENCOUNTER — TELEPHONE (OUTPATIENT)
Dept: CARDIOLOGY | Facility: CLINIC | Age: 79
End: 2019-01-07

## 2019-01-07 NOTE — TELEPHONE ENCOUNTER
I called pt because he noshowed for his cardiology hosp f/u/. He says he is now following Dr Chance for cardiology.

## 2019-01-23 NOTE — PROGRESS NOTES
Refill policies:    • Allow 2-3 business days for refills; controlled substances may take longer.   • Contact your pharmacy at least 5 days prior to running out of medication and have them send an electronic request or submit request through the “request re Subjective:     Patient ID: Raymond Stuart is a 77 y.o. male.    Chief Complaint: Follow-up (Left foot ulcers (bottom medial side-appears to be closing and draining clear fluid/lateral side-appears to be closed. Patient states no pain.) and Diabetes Mellitus (119 mg/dL-AM glucose. 2/13/17-last PCP visit with . )    Raymond is a 77 y.o. male who presents to the clinic for evaluation and treatment of high risk feet. Raymond has a past medical history of Diabetes mellitus, type 2; Hyperlipidemia; Hypertension; Irregular heartbeat; PVD (peripheral vascular disease); S/P femoral-popliteal bypass surgery (8/8/2014); and S/P peripheral artery angioplasty (8/8/2014). The patient's chief complaint is diabetic foot ulcer, left foot. Patient states he noticed area last week. Patient states home health applied betadine, doughnut pad and gauze to area.  Patient states he had procedure with Dr. Judge on last week.   This patient has documented high risk feet requiring routine maintenance secondary to diabetes mellitis and those secondary complications of diabetes, as mentioned..    PCP: JOHNATHON Cristobal Jr, MD    Date Last Seen by PCP: 2/3/17    Current shoe gear:  Affected Foot: Surgical shoe     Unaffected Foot: Diabetic shoes and inserts    History of Trauma: negative  Sign of Infection: none    Hemoglobin A1C   Date Value Ref Range Status   11/09/2016 6.9 (H) 4.5 - 6.2 % Final     Comment:     According to ADA guidelines, hemoglobin A1C <7.0% represents  optimal control in non-pregnant diabetic patients.  Different  metrics may apply to specific populations.   Standards of Medical Care in Diabetes - 2016.  For the purpose of screening for the presence of diabetes:  <5.7%     Consistent with the absence of diabetes  5.7-6.4%  Consistent with increasing risk for diabetes   (prediabetes)  >or=6.5%  Consistent with diabetes  Currently no consensus exists for use of hemoglobin A1C  for diagnosis of diabetes for children.      08/10/2016 8.1 (H) 4.5 - 6.2 % Final     Comment:     According to ADA guidelines, hemoglobin A1C <7.0% represents  optimal control in non-pregnant diabetic patients.  Different  metrics may apply to specific populations.   Standards of Medical Care in Diabetes - 2016.  For the purpose of screening for the presence of diabetes:  <5.7%     Consistent with the absence of diabetes  5.7-6.4%  Consistent with increasing risk for diabetes   (prediabetes)  >or=6.5%  Consistent with diabetes  Currently no consensus exists for use of hemoglobin A1C  for diagnosis of diabetes for children.     05/09/2016 8.0 (H) 4.5 - 6.2 % Final           Patient Active Problem List   Diagnosis    Hyperlipidemia associated with type 2 diabetes mellitus    Essential hypertension    A-fib    PVD (peripheral vascular disease)    Special screening for malignant neoplasms, colon    S/P femoral-popliteal bypass surgery    S/P peripheral artery angioplasty    Type 2 diabetes mellitus with microalbuminuria    Type 2 diabetes mellitus with diabetic neuropathy    Osteomyelitis of toe of left foot       Medication List with Changes/Refills   Current Medications    ATENOLOL (TENORMIN) 100 MG TABLET    Take 1 tablet by mouth  daily    CLOPIDOGREL (PLAVIX) 75 MG TABLET    Take 1 tablet by mouth  daily    DIPHENHYDRAMINE (BENADRYL) 50 MG/ML INJECTION        DOXYCYCLINE (VIBRAMYCIN) 100 MG CAP        FENOFIBRATE MICRONIZED (LOFIBRA) 134 MG CAP    Take 1 capsule by mouth  once daily    GABAPENTIN (NEURONTIN) 600 MG TABLET    Take 2 tablets (1,200 mg total) by mouth 2 (two) times daily.    GLIPIZIDE-METFORMIN (METAGLIP) 2.5-500 MG PER TABLET    Take 2 tablets by mouth  twice a day before meals in the morning and evening    HYDROCODONE-ACETAMINOPHEN 5-325MG (NORCO) 5-325 MG PER TABLET        HYDROCODONE-ACETAMINOPHEN 7.5-325MG (NORCO) 7.5-325 MG PER TABLET    Take 1 tablet by mouth every 6 (six) hours as needed for Pain.    INVANZ 1 GRAM INJECTION   entire amount billed. Precertification and Prior Authorizations: If your physician has recommended that you have a procedure or additional testing performed.   [facility]) will contact your insurance carrier to obtain pre-certi "      LISINOPRIL-HYDROCHLOROTHIAZIDE (PRINZIDE,ZESTORETIC) 20-12.5 MG PER TABLET    Take 2 tablets by mouth  once daily    MINOCYCLINE (MINOCIN,DYNACIN) 100 MG CAPSULE    Take 100 mg by mouth.     NIACIN 1,000 MG TBSR    Take 1 tablet by mouth Daily.    ROSUVASTATIN (CRESTOR) 40 MG TAB    Take 1 tablet (40 mg total) by mouth once daily.    SANTYL OINTMENT        VANCOMYCIN HCL IN DEXTROSE 5 % (VANCOMYCIN IN DEXTROSE 5 %) 1 GRAM/200 ML PGBK        WARFARIN (COUMADIN) 5 MG TABLET    TAKE 1 AND 1/2 TABLETS BY  MOUTH ON WEDNESDAY AND  SUNDAY AND 1 TABLET ALL  REMAINING DAY AS DIRECTED  BY THE [facility] CLINIC.       Review of patient's allergies indicates:   Allergen Reactions    Sulfa (sulfonamide antibiotics)      Other reaction(s): Stomach upset    Sulfamethoxazole Rash    Trimethoprim Rash     Bactrim         Past Surgical History:   Procedure Laterality Date    CARDIAC ELECTROPHYSIOLOGY MAPPING AND ABLATION      POPLITEAL ARTERY STENT  2013    RECTOPERITONEAL FISTULA CLOSURE      TOE SURGERY      Joint release    TONSILLECTOMY         History reviewed. No pertinent family history.    Social History     Social History    Marital status:      Spouse name: N/A    Number of children: N/A    Years of education: N/A     Occupational History    Retired [id number]     Social History Main Topics    Smoking status: Former Smoker     Packs/day: 4.00     Years: 30.00     Types: Cigarettes     Quit date: 1/9/1994    Smokeless tobacco: Never Used    Alcohol use No    Drug use: No    Sexual activity: Not on file     Other Topics Concern    Not on file     Social History Narrative       Vitals:    04/21/17 1536   BP: (!) 104/55   Pulse: 100   Weight: 111.7 kg (246 lb 4.1 oz)   Height: 5' 6" (1.676 m)   PainSc: 0-No pain       Hemoglobin A1C   Date Value Ref Range Status   11/09/2016 6.9 (H) 4.5 - 6.2 % Final     Comment:     According to ADA guidelines, hemoglobin A1C <7.0% represents  optimal " control in non-pregnant diabetic patients.  Different  metrics may apply to specific populations.   Standards of Medical Care in Diabetes - 2016.  For the purpose of screening for the presence of diabetes:  <5.7%     Consistent with the absence of diabetes  5.7-6.4%  Consistent with increasing risk for diabetes   (prediabetes)  >or=6.5%  Consistent with diabetes  Currently no consensus exists for use of hemoglobin A1C  for diagnosis of diabetes for children.     08/10/2016 8.1 (H) 4.5 - 6.2 % Final     Comment:     According to ADA guidelines, hemoglobin A1C <7.0% represents  optimal control in non-pregnant diabetic patients.  Different  metrics may apply to specific populations.   Standards of Medical Care in Diabetes - 2016.  For the purpose of screening for the presence of diabetes:  <5.7%     Consistent with the absence of diabetes  5.7-6.4%  Consistent with increasing risk for diabetes   (prediabetes)  >or=6.5%  Consistent with diabetes  Currently no consensus exists for use of hemoglobin A1C  for diagnosis of diabetes for children.     05/09/2016 8.0 (H) 4.5 - 6.2 % Final       Review of Systems   Constitutional: Negative for chills and fever.   Respiratory: Negative for shortness of breath.    Cardiovascular: Negative for chest pain, palpitations, orthopnea, claudication and leg swelling.   Gastrointestinal: Negative for diarrhea, nausea and vomiting.   Musculoskeletal: Negative for joint pain.   Skin: Negative for rash.   Neurological: Positive for tingling and sensory change. Negative for dizziness, focal weakness and weakness.   Endo/Heme/Allergies: Bruises/bleeds easily.   Psychiatric/Behavioral: Negative.            Objective:      Physical examination: General: Patient is in no acute distress, alert and oriented x 3.  Dressing to left foot clean, dry, and intact.   Lower Extremity Exam:  Vascular: Dorsalis pedis and Posterior tibial pulses palpable on left foot.  Capillary fill time <4 sec to toes on  left foot. Minimal edema noted on left foot.   Dermatologic: Ulcer noted to left plantar 1st submetatarsal with pink granular base. No drainage, no sinus tract noted. Ulcer measurement 0.6cmx0.6cm. Incision site well copated on left foot. Negative erythema, drainage, or increased temp noted to surgical site. Previous ulcer noted to left plantar 5th submetatarsal is closed with epithealized tissue noted.  Neurological: Light touch sensation intact to left foot.   Musculoskeletal: Negative pain on palpation/ROM of left foot. 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           Assessment:       Encounter Diagnoses   Name Primary?    Neuropathic foot ulcer, left, limited to breakdown of skin Yes    S/P amputation of lesser toe, left     PVD (peripheral vascular disease)          Plan:   Neuropathic foot ulcer, left, limited to breakdown of skin    S/P amputation of lesser toe, left  -     X-Ray Foot Complete Left; Future; Expected date: 4/21/17    PVD (peripheral vascular disease)      I counseled the patient on his conditions, their implications and medical management.  With patient's permission, the left foot ulcer was sharply excisionally debrided of viable and nonviable tissue down to good bleeding granular tissue base utilizing sterile #15 blade and tissue nipper and forceps. Wound was irrigated with sterile saline and bleeding was controlled with direct pressure. Minimal blood loss. The patient tolerated this well. Wound was then dressed with normlgel, mesalt, and wide band-aid. Patient was given instructions on daily dressing changes. Patient was also instructed on the importance of keeping the wound and dressings clean dry and intact and keeping pressure off the wound area until complete healing of the wound.The patient is alerted to watch for any signs of infection (redness, pus, pain, increased swelling or fever) and call if such occurs. Home wound care instructions are provided.  Continue home health.   Ordered left foot  x-rays to be taken on next visit.   Patient to return in 1 month or sooner if needed                 Nic Castillo DPM  [facility]

## 2019-01-28 ENCOUNTER — OFFICE VISIT (OUTPATIENT)
Dept: PODIATRY | Facility: CLINIC | Age: 79
End: 2019-01-28
Payer: MEDICARE

## 2019-01-28 VITALS
DIASTOLIC BLOOD PRESSURE: 67 MMHG | BODY MASS INDEX: 39.79 KG/M2 | SYSTOLIC BLOOD PRESSURE: 133 MMHG | HEIGHT: 66 IN | WEIGHT: 247.56 LBS | HEART RATE: 81 BPM

## 2019-01-28 DIAGNOSIS — E11.51 TYPE II DIABETES MELLITUS WITH PERIPHERAL CIRCULATORY DISORDER: ICD-10-CM

## 2019-01-28 DIAGNOSIS — B35.1 DERMATOPHYTOSIS OF NAIL: Primary | ICD-10-CM

## 2019-01-28 DIAGNOSIS — I73.9 PVD (PERIPHERAL VASCULAR DISEASE): ICD-10-CM

## 2019-01-28 DIAGNOSIS — L84 PRE-ULCERATIVE CALLUSES: ICD-10-CM

## 2019-01-28 PROCEDURE — 11056 PR TRIM BENIGN HYPERKERATOTIC SKIN LESION,2-4: ICD-10-PCS | Mod: Q7,S$GLB,, | Performed by: PODIATRIST

## 2019-01-28 PROCEDURE — 11056 PARNG/CUTG B9 HYPRKR LES 2-4: CPT | Mod: Q7,S$GLB,, | Performed by: PODIATRIST

## 2019-01-28 PROCEDURE — 99999 PR PBB SHADOW E&M-EST. PATIENT-LVL III: CPT | Mod: PBBFAC,,, | Performed by: PODIATRIST

## 2019-01-28 PROCEDURE — 99499 UNLISTED E&M SERVICE: CPT | Mod: S$GLB,,, | Performed by: PODIATRIST

## 2019-01-28 PROCEDURE — 11721 PR DEBRIDEMENT OF NAILS, 6 OR MORE: ICD-10-PCS | Mod: Q7,59,S$GLB, | Performed by: PODIATRIST

## 2019-01-28 PROCEDURE — 99499 NO LOS: ICD-10-PCS | Mod: S$GLB,,, | Performed by: PODIATRIST

## 2019-01-28 PROCEDURE — 11721 DEBRIDE NAIL 6 OR MORE: CPT | Mod: Q7,59,S$GLB, | Performed by: PODIATRIST

## 2019-01-28 PROCEDURE — 99999 PR PBB SHADOW E&M-EST. PATIENT-LVL III: ICD-10-PCS | Mod: PBBFAC,,, | Performed by: PODIATRIST

## 2019-01-28 NOTE — PROGRESS NOTES
Subjective:     Patient ID: Raymond Stuart is a 78 y.o. male.    Chief Complaint: Nail Care (PCP: Dr. Critsobal last seen 11/30/18)    Raymond is a 78 y.o. male who presents to the clinic for evaluation and treatment of high risk feet. Raymond has a past medical history of A-fib, Diabetes mellitus, type 2, Hyperlipidemia, Hypertension, Irregular heartbeat, PVD (peripheral vascular disease), S/P femoral-popliteal bypass surgery (8/8/2014), and S/P peripheral artery angioplasty (8/8/2014). The patient's chief complaint is foot check and routine nail care.  This patient has documented high risk feet requiring routine maintenance secondary to peripheral vascular disease. Patient states he saw Dr. Judge last week and all arteries are still open art this time.      PCP: JOHNATHON Cristobal Jr, MD    Date Last Seen by PCP: 11/30/18    Current shoe gear:  Affected Foot: Rx diabetic extra depth shoes and custom accommodative insoles     Unaffected Foot: Rx diabetic extra depth shoes and custom accommodative insoles    History of Trauma: negative  Sign of Infection: none    Hemoglobin A1C   Date Value Ref Range Status   11/23/2018 6.3 (H) 4.0 - 5.6 % Final     Comment:     ADA Screening Guidelines:  5.7-6.4%  Consistent with prediabetes  >or=6.5%  Consistent with diabetes  High levels of fetal hemoglobin interfere with the HbA1C  assay. Heterozygous hemoglobin variants (HbS, HgC, etc)do  not significantly interfere with this assay.   However, presence of multiple variants may affect accuracy.     05/23/2018 7.1 (H) 4.0 - 5.6 % Final     Comment:     According to ADA guidelines, hemoglobin A1c <7.0% represents  optimal control in non-pregnant diabetic patients. Different  metrics may apply to specific patient populations.   Standards of Medical Care in Diabetes-2016.  For the purpose of screening for the presence of diabetes:  <5.7%     Consistent with the absence of diabetes  5.7-6.4%  Consistent with increasing risk for diabetes    (prediabetes)  >or=6.5%  Consistent with diabetes  Currently, no consensus exists for use of hemoglobin A1c  for diagnosis of diabetes for children.  This Hemoglobin A1c assay has significant interference with fetal   hemoglobin   (HbF). The results are invalid for patients with abnormal amounts of   HbF,   including those with known Hereditary Persistence   of Fetal Hemoglobin. Heterozygous hemoglobin variants (HbAS, HbAC,   HbAD, HbAE, HbA2) do not significantly interfere with this assay;   however, presence of multiple variants in a sample may impact the %   interference.     11/15/2017 6.8 (H) 4.0 - 5.6 % Final     Comment:     According to ADA guidelines, hemoglobin A1c <7.0% represents  optimal control in non-pregnant diabetic patients. Different  metrics may apply to specific patient populations.   Standards of Medical Care in Diabetes-2016.  For the purpose of screening for the presence of diabetes:  <5.7%     Consistent with the absence of diabetes  5.7-6.4%  Consistent with increasing risk for diabetes   (prediabetes)  >or=6.5%  Consistent with diabetes  Currently, no consensus exists for use of hemoglobin A1c  for diagnosis of diabetes for children.  This Hemoglobin A1c assay has significant interference with fetal   hemoglobin   (HbF). The results are invalid for patients with abnormal amounts of   HbF,   including those with known Hereditary Persistence   of Fetal Hemoglobin. Heterozygous hemoglobin variants (HbAS, HbAC,   HbAD, HbAE, HbA2) do not significantly interfere with this assay;   however, presence of multiple variants in a sample may impact the %   interference.             Patient Active Problem List   Diagnosis    Hyperlipidemia associated with type 2 diabetes mellitus    Essential hypertension    A-fib    PVD (peripheral vascular disease)    Special screening for malignant neoplasms, colon    S/P femoral-popliteal bypass surgery    S/P peripheral artery angioplasty    Type 2  diabetes mellitus with diabetic neuropathy    Osteomyelitis of toe of left foot    CKD (chronic kidney disease) stage 3, GFR 30-59 ml/min    Pleural effusion on right    Thrombocytopenia    Chronic diastolic heart failure    Acute on chronic respiratory failure    COPD (chronic obstructive pulmonary disease) with acute bronchitis    Lymphadenopathy, mediastinal    Respiratory failure, acute and chronic          Medication List           Accurate as of 1/28/19  8:57 AM. If you have any questions, ask your nurse or doctor.               CHANGE how you take these medications    furosemide 20 MG tablet  Commonly known as:  LASIX  Take 1 tablet (20 mg total) by mouth once daily.  What changed:  how much to take        CONTINUE taking these medications    albuterol 90 mcg/actuation inhaler  Commonly known as:  PROVENTIL/VENTOLIN HFA  Inhale 1-2 puffs into the lungs every 4 (four) hours as needed for Wheezing or Shortness of Breath (cough).     albuterol-ipratropium 2.5 mg-0.5 mg/3 mL nebulizer solution  Commonly known as:  DUO-NEB  Take 3 mLs by nebulization every 4 (four) hours as needed for Wheezing. Rescue     atenolol 100 MG tablet  Commonly known as:  TENORMIN  TAKE 1 TABLET BY MOUTH ONCE DAILY     blood-glucose meter kit  To check BG 2 times daily, to use with insurance preferred meter     clopidogrel 75 mg tablet  Commonly known as:  PLAVIX  TAKE 1 TABLET BY MOUTH  DAILY     fenofibrate micronized 134 MG Cap  Commonly known as:  LOFIBRA  Take 1 capsule (134 mg total) by mouth once daily.     fluorouracil 5 % cream  Commonly known as:  EFUDEX     gabapentin 600 MG tablet  Commonly known as:  NEURONTIN  TAKE 2 TABLETS BY MOUTH TWO TIMES DAILY     glipizide-metformin 2.5-500 mg per tablet  Commonly known as:  METAGLIP  TAKE 2 TABLETS BY MOUTH  TWICE A DAY BEFORE MEALS IN THE MORNING AND EVENING     guaiFENesin 400 mg Tab  Commonly known as:  HUMIBID E     imiquimod 5 % cream  Commonly known as:  ALDARA     *  lancets Misc  To check BG 2 times daily, to use with insurance preferred meter     * ONETOUCH DELICA LANCETS 30 gauge Misc  Generic drug:  lancets  USE TWO TIMES DAILY AND AS  NEEDED     lisinopril-hydrochlorothiazide 20-12.5 mg per tablet  Commonly known as:  PRINZIDE,ZESTORETIC  TAKE 2 TABLETS BY MOUTH  ONCE DAILY     ONETOUCH ULTRA BLUE TEST STRIP Strp  Generic drug:  blood sugar diagnostic  USE TWO TIMES DAILY AND AS  NEEDED     rosuvastatin 40 MG Tab  Commonly known as:  CRESTOR  TAKE 1 TABLET BY MOUTH ONCE DAILY     * warfarin 5 MG tablet  Commonly known as:  COUMADIN  TAKE 1 AND 1/2 TABLETS BY  MOUTH ON WEDNESDAY AND  SUNDAY AND 1 TABLET ALL  REMAINING DAYS AS DIRECTED  BY THE COUMADIN CLINIC.     * warfarin 5 MG tablet  Commonly known as:  COUMADIN  Take 1 tablet (5 mg total) by mouth Daily.         * This list has 4 medication(s) that are the same as other medications prescribed for you. Read the directions carefully, and ask your doctor or other care provider to review them with you.                Review of patient's allergies indicates:   Allergen Reactions    Sulfa (sulfonamide antibiotics)      Other reaction(s): Stomach upset    Sulfamethoxazole Rash    Trimethoprim Rash     Bactrim         Past Surgical History:   Procedure Laterality Date    AMPUTATION- TOE Left 2/17/2017    [facility] D[facility] at [facility]    CARDIAC ELECTROPHYSIOLOGY MAPPING AND ABLATION      COLONOSCOPY N/A 1/9/2014    Performed by Yinka Clifford MD at [facility] ENDO    POPLITEAL ARTERY STENT  2013    RECTOPERITONEAL FISTULA CLOSURE      right leg bipass      THORACENTESIS Right 12/20/2018    Performed by Princess Schaffer MD at [facility] ENDO    TOE SURGERY      Joint release    TONSILLECTOMY         History reviewed. No pertinent family history.    Social History     Socioeconomic History    Marital status:      Spouse name: Not on file    Number of children: Not on file    Years of education: Not on  "file    Highest education level: Not on file   Social Needs    Financial resource strain: Not on file    Food insecurity - worry: Not on file    Food insecurity - inability: Not on file    Transportation needs - medical: Not on file    Transportation needs - non-medical: Not on file   Occupational History    Occupation: Retired      Comment: [id number]   Tobacco Use    Smoking status: Former Smoker     Packs/day: 4.00     Years: 30.00     Pack years: 120.00     Types: Cigarettes     Last attempt to quit: 1994     Years since quittin.0    Smokeless tobacco: Never Used   Substance and Sexual Activity    Alcohol use: No    Drug use: No    Sexual activity: Not on file   Other Topics Concern    Not on file   Social History Narrative    Not on file       Vitals:    19 0826   BP: 133/67   Pulse: 81   Weight: 112.3 kg (247 lb 9.2 oz)   Height: 5' 6" (1.676 m)   PainSc: 0-No pain       Hemoglobin A1C   Date Value Ref Range Status   2018 6.3 (H) 4.0 - 5.6 % Final     Comment:     ADA Screening Guidelines:  5.7-6.4%  Consistent with prediabetes  >or=6.5%  Consistent with diabetes  High levels of fetal hemoglobin interfere with the HbA1C  assay. Heterozygous hemoglobin variants (HbS, HgC, etc)do  not significantly interfere with this assay.   However, presence of multiple variants may affect accuracy.     2018 7.1 (H) 4.0 - 5.6 % Final     Comment:     According to ADA guidelines, hemoglobin A1c <7.0% represents  optimal control in non-pregnant diabetic patients. Different  metrics may apply to specific patient populations.   Standards of Medical Care in Diabetes-2016.  For the purpose of screening for the presence of diabetes:  <5.7%     Consistent with the absence of diabetes  5.7-6.4%  Consistent with increasing risk for diabetes   (prediabetes)  >or=6.5%  Consistent with diabetes  Currently, no consensus exists for use of hemoglobin A1c  for diagnosis of diabetes for " children.  This Hemoglobin A1c assay has significant interference with fetal   hemoglobin   (HbF). The results are invalid for patients with abnormal amounts of   HbF,   including those with known Hereditary Persistence   of Fetal Hemoglobin. Heterozygous hemoglobin variants (HbAS, HbAC,   HbAD, HbAE, HbA2) do not significantly interfere with this assay;   however, presence of multiple variants in a sample may impact the %   interference.     11/15/2017 6.8 (H) 4.0 - 5.6 % Final     Comment:     According to ADA guidelines, hemoglobin A1c <7.0% represents  optimal control in non-pregnant diabetic patients. Different  metrics may apply to specific patient populations.   Standards of Medical Care in Diabetes-2016.  For the purpose of screening for the presence of diabetes:  <5.7%     Consistent with the absence of diabetes  5.7-6.4%  Consistent with increasing risk for diabetes   (prediabetes)  >or=6.5%  Consistent with diabetes  Currently, no consensus exists for use of hemoglobin A1c  for diagnosis of diabetes for children.  This Hemoglobin A1c assay has significant interference with fetal   hemoglobin   (HbF). The results are invalid for patients with abnormal amounts of   HbF,   including those with known Hereditary Persistence   of Fetal Hemoglobin. Heterozygous hemoglobin variants (HbAS, HbAC,   HbAD, HbAE, HbA2) do not significantly interfere with this assay;   however, presence of multiple variants in a sample may impact the %   interference.         Review of Systems   Constitutional: Negative for chills and fever.   Respiratory: Negative for shortness of breath.    Cardiovascular: Negative for chest pain, palpitations, orthopnea, claudication and leg swelling.   Gastrointestinal: Negative for diarrhea, nausea and vomiting.   Musculoskeletal: Negative for joint pain.   Skin: Negative for rash.   Neurological: Positive for sensory change. Negative for dizziness, tingling, focal weakness and weakness.    Psychiatric/Behavioral: Negative.          Objective:      PHYSICAL EXAM: Apperance: Alert and orient in no distress,well developed, and with good attention to grooming and body habits  Patient presents ambulating in diabetic shoes and inserts.  Lower Extremity Exam  VASCULAR: Dorsalis pedis pulses 1/4 bilateral and Posterior Tibial pulses 1/4 bilateral. Capillary fill time <4 seconds bilateral. No edema observed bilateral. Varicosities present bilateral. Skin temperature of the lower extremities is warm to warm, proximal to distal. Hair growth absent bilateral. (--) lymphangitis or (--) cellulitis noted bilateral.  DERMATOLOGICAL: (--) edema, (--) erythema, (--) malodor, (-) drainage, (--) warmth to left foot. Left hallux nail absent. Nails 2,3,4 left and 2,3,4,5 right elongated, thickened, and discolored with subungual debris. The dorsum surface of the feet are soft and supple.  The plantar aspects of both feet are dry and scaly. Webspaces 1,2,3,4 clean, dry and without evidence of break in skin integrity bilateral. Thin hyperkeratotic tissue noted to right partial hallux and plantar left 5th submetatarsal.   NEUROLOGICAL: Light touch, sharp-dull, proprioception all present and equal bilaterally.    MUSCULOSKELETAL: Muscle strength is 5/5 for foot inverters, everters, plantarflexors, and dorsiflexors. Muscle tone is normal.  Inspection/palpation of bone, joints and muscles unremarkable with the exception of that noted above. 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            Assessment:       Encounter Diagnoses   Name Primary?    Dermatophytosis of nail Yes    Pre-ulcerative calluses     PVD (peripheral vascular disease)     Type II diabetes mellitus with peripheral circulatory disorder          Plan:   Dermatophytosis of nail    Pre-ulcerative calluses    PVD (peripheral vascular disease)    Type II diabetes mellitus with peripheral circulatory disorder      I counseled the patient on his conditions, regarding findings  of my examination, my impressions, and usual treatment plan.   Greater than 50% of this visit spent on counseling and coordination of care.  Greater than 20 minutes spent on education about the PVD, and prevention of limb loss.  Shoe inspection. Patient instructed on proper foot hygeine. We discussed wearing proper shoe gear, daily foot inspections, never walking without protective shoe gear, never putting sharp instruments to feet.    With patient's permission, nails as noted above bilateral were trimmed in length and thickness aggressively to their soft tissue attachment mechanically and with electric , removing all offending nail and debris. Patient relates relief following the procedure.  With patient's permission, bilateral callus trimmed in thickness with #15 blade in thickness without incident.   Patient  will continue to monitor the areas daily, inspect feet, wear protective shoe gear when ambulatory, moisturizer to maintain skin integrity.   Patient to return 2 months or sooner if needed.                  Nic Castillo DPM  [facility]

## 2019-02-22 LAB
ACID FAST MOD KINY STN SPEC: NORMAL
MYCOBACTERIUM SPEC QL CULT: NORMAL

## 2019-03-08 ENCOUNTER — PATIENT MESSAGE (OUTPATIENT)
Dept: INTERNAL MEDICINE | Facility: CLINIC | Age: 79
End: 2019-03-08

## 2019-03-08 ENCOUNTER — PATIENT MESSAGE (OUTPATIENT)
Dept: CARDIOLOGY | Facility: CLINIC | Age: 79
End: 2019-03-08

## 2019-04-01 ENCOUNTER — OFFICE VISIT (OUTPATIENT)
Dept: PODIATRY | Facility: CLINIC | Age: 79
End: 2019-04-01
Payer: MEDICARE

## 2019-04-01 VITALS
WEIGHT: 246.06 LBS | BODY MASS INDEX: 39.54 KG/M2 | DIASTOLIC BLOOD PRESSURE: 57 MMHG | HEART RATE: 74 BPM | HEIGHT: 66 IN | RESPIRATION RATE: 17 BRPM | SYSTOLIC BLOOD PRESSURE: 127 MMHG

## 2019-04-01 DIAGNOSIS — E11.51 TYPE II DIABETES MELLITUS WITH PERIPHERAL CIRCULATORY DISORDER: ICD-10-CM

## 2019-04-01 DIAGNOSIS — L84 PRE-ULCERATIVE CALLUSES: ICD-10-CM

## 2019-04-01 DIAGNOSIS — I73.9 PVD (PERIPHERAL VASCULAR DISEASE): ICD-10-CM

## 2019-04-01 DIAGNOSIS — B35.1 DERMATOPHYTOSIS OF NAIL: Primary | ICD-10-CM

## 2019-04-01 PROCEDURE — 11721 DEBRIDE NAIL 6 OR MORE: CPT | Mod: 59,Q7,S$GLB,ICN | Performed by: PODIATRIST

## 2019-04-01 PROCEDURE — 99999 PR PBB SHADOW E&M-EST. PATIENT-LVL III: ICD-10-PCS | Mod: PBBFAC,,, | Performed by: PODIATRIST

## 2019-04-01 PROCEDURE — 11056 PARNG/CUTG B9 HYPRKR LES 2-4: CPT | Mod: Q7,S$GLB,ICN, | Performed by: PODIATRIST

## 2019-04-01 PROCEDURE — 11721 PR DEBRIDEMENT OF NAILS, 6 OR MORE: ICD-10-PCS | Mod: 59,Q7,S$GLB,ICN | Performed by: PODIATRIST

## 2019-04-01 PROCEDURE — 99499 UNLISTED E&M SERVICE: CPT | Mod: S$GLB,,, | Performed by: PODIATRIST

## 2019-04-01 PROCEDURE — 99999 PR PBB SHADOW E&M-EST. PATIENT-LVL III: CPT | Mod: PBBFAC,,, | Performed by: PODIATRIST

## 2019-04-01 PROCEDURE — 99499 NO LOS: ICD-10-PCS | Mod: S$GLB,,, | Performed by: PODIATRIST

## 2019-04-01 PROCEDURE — 11056 PR TRIM BENIGN HYPERKERATOTIC SKIN LESION,2-4: ICD-10-PCS | Mod: Q7,S$GLB,ICN, | Performed by: PODIATRIST

## 2019-04-01 NOTE — PROGRESS NOTES
Subjective:     Patient ID: Raymond Stuart is a 79 y.o. male.    Chief Complaint: Nail Care (no c/o pain. wears casual shoes with socks. diabetic pt. PCP Dr. Gomez)    Raymond is a 79 y.o. male who presents to the clinic for evaluation and treatment of high risk feet. Raymond has a past medical history of A-fib, Diabetes mellitus, type 2, Hyperlipidemia, Hypertension, Irregular heartbeat, PVD (peripheral vascular disease), S/P femoral-popliteal bypass surgery (8/8/2014), and S/P peripheral artery angioplasty (8/8/2014). The patient's chief complaint is foot check and routine nail care.  This patient has documented high risk feet requiring routine maintenance secondary to peripheral vascular disease.     PCP: JOHNATHON Cristobal Jr, MD    Date Last Seen by PCP: 11/30/18    Current shoe gear:  Affected Foot: Rx diabetic extra depth shoes and custom accommodative insoles     Unaffected Foot: Rx diabetic extra depth shoes and custom accommodative insoles    History of Trauma: negative  Sign of Infection: none    Hemoglobin A1C   Date Value Ref Range Status   11/23/2018 6.3 (H) 4.0 - 5.6 % Final     Comment:     ADA Screening Guidelines:  5.7-6.4%  Consistent with prediabetes  >or=6.5%  Consistent with diabetes  High levels of fetal hemoglobin interfere with the HbA1C  assay. Heterozygous hemoglobin variants (HbS, HgC, etc)do  not significantly interfere with this assay.   However, presence of multiple variants may affect accuracy.     05/23/2018 7.1 (H) 4.0 - 5.6 % Final     Comment:     According to ADA guidelines, hemoglobin A1c <7.0% represents  optimal control in non-pregnant diabetic patients. Different  metrics may apply to specific patient populations.   Standards of Medical Care in Diabetes-2016.  For the purpose of screening for the presence of diabetes:  <5.7%     Consistent with the absence of diabetes  5.7-6.4%  Consistent with increasing risk for diabetes   (prediabetes)  >or=6.5%  Consistent with diabetes  Currently,  no consensus exists for use of hemoglobin A1c  for diagnosis of diabetes for children.  This Hemoglobin A1c assay has significant interference with fetal   hemoglobin   (HbF). The results are invalid for patients with abnormal amounts of   HbF,   including those with known Hereditary Persistence   of Fetal Hemoglobin. Heterozygous hemoglobin variants (HbAS, HbAC,   HbAD, HbAE, HbA2) do not significantly interfere with this assay;   however, presence of multiple variants in a sample may impact the %   interference.     11/15/2017 6.8 (H) 4.0 - 5.6 % Final     Comment:     According to ADA guidelines, hemoglobin A1c <7.0% represents  optimal control in non-pregnant diabetic patients. Different  metrics may apply to specific patient populations.   Standards of Medical Care in Diabetes-2016.  For the purpose of screening for the presence of diabetes:  <5.7%     Consistent with the absence of diabetes  5.7-6.4%  Consistent with increasing risk for diabetes   (prediabetes)  >or=6.5%  Consistent with diabetes  Currently, no consensus exists for use of hemoglobin A1c  for diagnosis of diabetes for children.  This Hemoglobin A1c assay has significant interference with fetal   hemoglobin   (HbF). The results are invalid for patients with abnormal amounts of   HbF,   including those with known Hereditary Persistence   of Fetal Hemoglobin. Heterozygous hemoglobin variants (HbAS, HbAC,   HbAD, HbAE, HbA2) do not significantly interfere with this assay;   however, presence of multiple variants in a sample may impact the %   interference.             Patient Active Problem List   Diagnosis    Hyperlipidemia associated with type 2 diabetes mellitus    Essential hypertension    A-fib    PVD (peripheral vascular disease)    Special screening for malignant neoplasms, colon    S/P femoral-popliteal bypass surgery    S/P peripheral artery angioplasty    Type 2 diabetes mellitus with diabetic neuropathy    Osteomyelitis of toe  of left foot    CKD (chronic kidney disease) stage 3, GFR 30-59 ml/min    Pleural effusion on right    Thrombocytopenia    Chronic diastolic heart failure    Acute on chronic respiratory failure    COPD (chronic obstructive pulmonary disease) with acute bronchitis    Lymphadenopathy, mediastinal    Respiratory failure, acute and chronic       Medication List with Changes/Refills   Current Medications    ALBUTEROL (PROVENTIL/VENTOLIN HFA) 90 MCG/ACTUATION INHALER    Inhale 1-2 puffs into the lungs every 4 (four) hours as needed for Wheezing or Shortness of Breath (cough).    ALBUTEROL-IPRATROPIUM (DUO-NEB) 2.5 MG-0.5 MG/3 ML NEBULIZER SOLUTION    Take 3 mLs by nebulization every 4 (four) hours as needed for Wheezing. Rescue    ATENOLOL (TENORMIN) 100 MG TABLET    TAKE 1 TABLET BY MOUTH ONCE DAILY    BLOOD-GLUCOSE METER KIT    To check BG 2 times daily, to use with insurance preferred meter    CLOPIDOGREL (PLAVIX) 75 MG TABLET    TAKE 1 TABLET BY MOUTH  DAILY    FENOFIBRATE MICRONIZED (LOFIBRA) 134 MG CAP    Take 1 capsule (134 mg total) by mouth once daily.    FLUOROURACIL (EFUDEX) 5 % CREAM        FUROSEMIDE (LASIX) 20 MG TABLET    Take 1 tablet (20 mg total) by mouth once daily.    GABAPENTIN (NEURONTIN) 600 MG TABLET    TAKE 2 TABLETS BY MOUTH TWO TIMES DAILY    GLIPIZIDE-METFORMIN (METAGLIP) 2.5-500 MG PER TABLET    TAKE 2 TABLETS BY MOUTH  TWICE A DAY BEFORE MEALS IN THE MORNING AND EVENING    GUAIFENESIN (HUMIBID E) 400 MG TAB    Take 400 mg by mouth 3 (three) times daily as needed.    IMIQUIMOD (ALDARA) 5 % CREAM        LANCETS MISC    To check BG 2 times daily, to use with insurance preferred meter    LISINOPRIL-HYDROCHLOROTHIAZIDE (PRINZIDE,ZESTORETIC) 20-12.5 MG PER TABLET    TAKE 2 TABLETS BY MOUTH  ONCE DAILY    ONETOUCH DELICA LANCETS 30 GAUGE MISC    USE TWO TIMES DAILY AND AS  NEEDED    ONETOUCH ULTRA BLUE TEST STRIP STRP    USE TWO TIMES DAILY AND AS  NEEDED    ROSUVASTATIN (CRESTOR) 40 MG TAB     TAKE 1 TABLET BY MOUTH ONCE DAILY    WARFARIN (COUMADIN) 5 MG TABLET    TAKE 1 AND 1/2 TABLETS BY  MOUTH ON WEDNESDAY AND   AND 1 TABLET ALL  REMAINING DAYS AS DIRECTED  BY THE COUMADIN CLINIC.    WARFARIN (COUMADIN) 5 MG TABLET    Take 1 tablet (5 mg total) by mouth Daily.       Review of patient's allergies indicates:   Allergen Reactions    Sulfa (sulfonamide antibiotics)      Other reaction(s): Stomach upset    Sulfamethoxazole Rash    Trimethoprim Rash     Bactrim         Past Surgical History:   Procedure Laterality Date    AMPUTATION- TOE Left 2017    [facility] at [facility]    CARDIAC ELECTROPHYSIOLOGY MAPPING AND ABLATION      COLONOSCOPY N/A 2014    Performed by Yinka Clifford MD at [facility] ENDO    POPLITEAL ARTERY STENT  2013    RECTOPERITONEAL FISTULA CLOSURE      right leg bipass      THORACENTESIS Right 2018    Performed by Princess Schaffer MD at [facility] ENDO    TOE SURGERY      Joint release    TONSILLECTOMY         History reviewed. No pertinent family history.    Social History     Socioeconomic History    Marital status:      Spouse name: Not on file    Number of children: Not on file    Years of education: Not on file    Highest education level: Not on file   Occupational History    Occupation: Retired      Comment: [id number]   Social Needs    Financial resource strain: Not on file    Food insecurity:     Worry: Not on file     Inability: Not on file    Transportation needs:     Medical: Not on file     Non-medical: Not on file   Tobacco Use    Smoking status: Former Smoker     Packs/day: 4.00     Years: 30.00     Pack years: 120.00     Types: Cigarettes     Last attempt to quit: 1994     Years since quittin.2    Smokeless tobacco: Never Used   Substance and Sexual Activity    Alcohol use: No    Drug use: No    Sexual activity: Not on file   Lifestyle    Physical activity:     Days per week: Not on file      "Minutes per session: Not on file    Stress: Not on file   Relationships    Social connections:     Talks on phone: Not on file     Gets together: Not on file     Attends Protestant service: Not on file     Active member of club or organization: Not on file     Attends meetings of clubs or organizations: Not on file     Relationship status: Not on file    Intimate partner violence:     Fear of current or ex partner: Not on file     Emotionally abused: Not on file     Physically abused: Not on file     Forced sexual activity: Not on file   Other Topics Concern    Not on file   Social History Narrative    Not on file       Vitals:    04/01/19 0802   BP: (!) 127/57   Pulse: 74   Resp: 17   Weight: 111.6 kg (246 lb 0.5 oz)   Height: 5' 6" (1.676 m)   PainSc: 0-No pain   PainLoc: Foot       Hemoglobin A1C   Date Value Ref Range Status   11/23/2018 6.3 (H) 4.0 - 5.6 % Final     Comment:     ADA Screening Guidelines:  5.7-6.4%  Consistent with prediabetes  >or=6.5%  Consistent with diabetes  High levels of fetal hemoglobin interfere with the HbA1C  assay. Heterozygous hemoglobin variants (HbS, HgC, etc)do  not significantly interfere with this assay.   However, presence of multiple variants may affect accuracy.     05/23/2018 7.1 (H) 4.0 - 5.6 % Final     Comment:     According to ADA guidelines, hemoglobin A1c <7.0% represents  optimal control in non-pregnant diabetic patients. Different  metrics may apply to specific patient populations.   Standards of Medical Care in Diabetes-2016.  For the purpose of screening for the presence of diabetes:  <5.7%     Consistent with the absence of diabetes  5.7-6.4%  Consistent with increasing risk for diabetes   (prediabetes)  >or=6.5%  Consistent with diabetes  Currently, no consensus exists for use of hemoglobin A1c  for diagnosis of diabetes for children.  This Hemoglobin A1c assay has significant interference with fetal   hemoglobin   (HbF). The results are invalid for patients " with abnormal amounts of   HbF,   including those with known Hereditary Persistence   of Fetal Hemoglobin. Heterozygous hemoglobin variants (HbAS, HbAC,   HbAD, HbAE, HbA2) do not significantly interfere with this assay;   however, presence of multiple variants in a sample may impact the %   interference.     11/15/2017 6.8 (H) 4.0 - 5.6 % Final     Comment:     According to ADA guidelines, hemoglobin A1c <7.0% represents  optimal control in non-pregnant diabetic patients. Different  metrics may apply to specific patient populations.   Standards of Medical Care in Diabetes-2016.  For the purpose of screening for the presence of diabetes:  <5.7%     Consistent with the absence of diabetes  5.7-6.4%  Consistent with increasing risk for diabetes   (prediabetes)  >or=6.5%  Consistent with diabetes  Currently, no consensus exists for use of hemoglobin A1c  for diagnosis of diabetes for children.  This Hemoglobin A1c assay has significant interference with fetal   hemoglobin   (HbF). The results are invalid for patients with abnormal amounts of   HbF,   including those with known Hereditary Persistence   of Fetal Hemoglobin. Heterozygous hemoglobin variants (HbAS, HbAC,   HbAD, HbAE, HbA2) do not significantly interfere with this assay;   however, presence of multiple variants in a sample may impact the %   interference.         Review of Systems   Constitutional: Negative for chills and fever.   Respiratory: Negative for shortness of breath.    Cardiovascular: Negative for chest pain, palpitations, orthopnea, claudication and leg swelling.   Gastrointestinal: Negative for diarrhea, nausea and vomiting.   Musculoskeletal: Negative for joint pain.   Skin: Negative for rash.   Neurological: Positive for sensory change. Negative for dizziness, tingling, focal weakness and weakness.   Psychiatric/Behavioral: Negative.          Objective:      PHYSICAL EXAM: Apperance: Alert and orient in no distress,well developed, and with  good attention to grooming and body habits  Patient presents ambulating in diabetic shoes and inserts.  Lower Extremity Exam  VASCULAR: Dorsalis pedis pulses 1/4 bilateral and Posterior Tibial pulses 1/4 bilateral. Capillary fill time <4 seconds bilateral. No edema observed bilateral. Varicosities present bilateral. Skin temperature of the lower extremities is warm to warm, proximal to distal. Hair growth absent bilateral. (--) lymphangitis or (--) cellulitis noted bilateral.  DERMATOLOGICAL: (--) edema, (--) erythema, (--) malodor, (-) drainage, (--) warmth to left foot. Left hallux nail absent. Nails 2,3,4 left and 2,3,4,5 right elongated, thickened, and discolored with subungual debris. The dorsum surface of the feet are soft and supple.  The plantar aspects of both feet are dry and scaly. Webspaces 1,2,3,4 clean, dry and without evidence of break in skin integrity bilateral. Thin hyperkeratotic tissue noted to right partial hallux and plantar left 5th submetatarsal.   NEUROLOGICAL: Light touch, sharp-dull, proprioception all present and equal bilaterally.    MUSCULOSKELETAL: Muscle strength is 5/5 for foot inverters, everters, plantarflexors, and dorsiflexors. Muscle tone is normal.  Inspection/palpation of bone, joints and muscles unremarkable with the exception of that noted above. 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        Assessment:       Encounter Diagnoses   Name Primary?    Dermatophytosis of nail Yes    Pre-ulcerative calluses     PVD (peripheral vascular disease)     Type II diabetes mellitus with peripheral circulatory disorder          Plan:   Dermatophytosis of nail    Pre-ulcerative calluses    PVD (peripheral vascular disease)    Type II diabetes mellitus with peripheral circulatory disorder      I counseled the patient on his conditions, regarding findings of my examination, my impressions, and usual treatment plan.   Greater than 50% of this visit spent on counseling and coordination of care.  Greater  than 20 minutes spent on education about the PVD, and prevention of limb loss.  Shoe inspection. Patient instructed on proper foot hygeine. We discussed wearing proper shoe gear, daily foot inspections, never walking without protective shoe gear, never putting sharp instruments to feet.    With patient's permission, nails as noted above bilateral were trimmed in length and thickness aggressively to their soft tissue attachment mechanically and with electric , removing all offending nail and debris. Patient relates relief following the procedure.  With patient's permission, bilateral callus trimmed in thickness with #15 blade in thickness without incident.   Patient  will continue to monitor the areas daily, inspect feet, wear protective shoe gear when ambulatory, moisturizer to maintain skin integrity.   Patient to return 2 months or sooner if needed.                  Nic Castillo DPM  [facility]

## 2019-06-03 ENCOUNTER — OFFICE VISIT (OUTPATIENT)
Dept: PODIATRY | Facility: CLINIC | Age: 79
End: 2019-06-03
Payer: MEDICARE

## 2019-06-03 VITALS
BODY MASS INDEX: 39.53 KG/M2 | WEIGHT: 246 LBS | HEART RATE: 90 BPM | DIASTOLIC BLOOD PRESSURE: 67 MMHG | HEIGHT: 66 IN | SYSTOLIC BLOOD PRESSURE: 127 MMHG

## 2019-06-03 DIAGNOSIS — E11.51 TYPE II DIABETES MELLITUS WITH PERIPHERAL CIRCULATORY DISORDER: ICD-10-CM

## 2019-06-03 DIAGNOSIS — I73.9 PVD (PERIPHERAL VASCULAR DISEASE): ICD-10-CM

## 2019-06-03 DIAGNOSIS — L84 PRE-ULCERATIVE CALLUSES: ICD-10-CM

## 2019-06-03 DIAGNOSIS — B35.1 DERMATOPHYTOSIS OF NAIL: Primary | ICD-10-CM

## 2019-06-03 PROCEDURE — 11721 PR DEBRIDEMENT OF NAILS, 6 OR MORE: ICD-10-PCS | Mod: 59,Q7,S$GLB, | Performed by: PODIATRIST

## 2019-06-03 PROCEDURE — 99999 PR PBB SHADOW E&M-EST. PATIENT-LVL III: CPT | Mod: PBBFAC,,, | Performed by: PODIATRIST

## 2019-06-03 PROCEDURE — 99999 PR PBB SHADOW E&M-EST. PATIENT-LVL III: ICD-10-PCS | Mod: PBBFAC,,, | Performed by: PODIATRIST

## 2019-06-03 PROCEDURE — 11056 PARNG/CUTG B9 HYPRKR LES 2-4: CPT | Mod: Q7,S$GLB,, | Performed by: PODIATRIST

## 2019-06-03 PROCEDURE — 11056 PR TRIM BENIGN HYPERKERATOTIC SKIN LESION,2-4: ICD-10-PCS | Mod: Q7,S$GLB,, | Performed by: PODIATRIST

## 2019-06-03 PROCEDURE — 11721 DEBRIDE NAIL 6 OR MORE: CPT | Mod: 59,Q7,S$GLB, | Performed by: PODIATRIST

## 2019-06-03 PROCEDURE — 99499 NO LOS: ICD-10-PCS | Mod: S$GLB,,, | Performed by: PODIATRIST

## 2019-06-03 PROCEDURE — 99499 UNLISTED E&M SERVICE: CPT | Mod: S$GLB,,, | Performed by: PODIATRIST

## 2019-06-03 NOTE — PROGRESS NOTES
Subjective:     Patient ID: Raymond Stuart is a 79 y.o. male.    Chief Complaint: Nail Care (no c/o pain. wears tennis shoes with socks. diabetic Pt. last seen on 05/03/19 with PCP Dr. Gomez. )    Raymond is a 79 y.o. male who presents to the clinic for evaluation and treatment of high risk feet. Raymond has a past medical history of A-fib, Diabetes mellitus, type 2, Hyperlipidemia, Hypertension, Irregular heartbeat, PVD (peripheral vascular disease), S/P femoral-popliteal bypass surgery (8/8/2014), and S/P peripheral artery angioplasty (8/8/2014). The patient's chief complaint is foot check and routine nail care.  This patient has documented high risk feet requiring routine maintenance secondary to peripheral vascular disease.     PCP: Eran Gomez MD    Date Last Seen by PCP: 05/03/19    Current shoe gear:  Affected Foot: Rx diabetic extra depth shoes and custom accommodative insoles     Unaffected Foot: Rx diabetic extra depth shoes and custom accommodative insoles    History of Trauma: negative  Sign of Infection: none    Hemoglobin A1C   Date Value Ref Range Status   11/23/2018 6.3 (H) 4.0 - 5.6 % Final     Comment:     ADA Screening Guidelines:  5.7-6.4%  Consistent with prediabetes  >or=6.5%  Consistent with diabetes  High levels of fetal hemoglobin interfere with the HbA1C  assay. Heterozygous hemoglobin variants (HbS, HgC, etc)do  not significantly interfere with this assay.   However, presence of multiple variants may affect accuracy.     05/23/2018 7.1 (H) 4.0 - 5.6 % Final     Comment:     According to ADA guidelines, hemoglobin A1c <7.0% represents  optimal control in non-pregnant diabetic patients. Different  metrics may apply to specific patient populations.   Standards of Medical Care in Diabetes-2016.  For the purpose of screening for the presence of diabetes:  <5.7%     Consistent with the absence of diabetes  5.7-6.4%  Consistent with increasing risk for diabetes   (prediabetes)  >or=6.5%  Consistent  with diabetes  Currently, no consensus exists for use of hemoglobin A1c  for diagnosis of diabetes for children.  This Hemoglobin A1c assay has significant interference with fetal   hemoglobin   (HbF). The results are invalid for patients with abnormal amounts of   HbF,   including those with known Hereditary Persistence   of Fetal Hemoglobin. Heterozygous hemoglobin variants (HbAS, HbAC,   HbAD, HbAE, HbA2) do not significantly interfere with this assay;   however, presence of multiple variants in a sample may impact the %   interference.     11/15/2017 6.8 (H) 4.0 - 5.6 % Final     Comment:     According to ADA guidelines, hemoglobin A1c <7.0% represents  optimal control in non-pregnant diabetic patients. Different  metrics may apply to specific patient populations.   Standards of Medical Care in Diabetes-2016.  For the purpose of screening for the presence of diabetes:  <5.7%     Consistent with the absence of diabetes  5.7-6.4%  Consistent with increasing risk for diabetes   (prediabetes)  >or=6.5%  Consistent with diabetes  Currently, no consensus exists for use of hemoglobin A1c  for diagnosis of diabetes for children.  This Hemoglobin A1c assay has significant interference with fetal   hemoglobin   (HbF). The results are invalid for patients with abnormal amounts of   HbF,   including those with known Hereditary Persistence   of Fetal Hemoglobin. Heterozygous hemoglobin variants (HbAS, HbAC,   HbAD, HbAE, HbA2) do not significantly interfere with this assay;   however, presence of multiple variants in a sample may impact the %   interference.             Patient Active Problem List   Diagnosis    Hyperlipidemia associated with type 2 diabetes mellitus    Essential hypertension    A-fib    PVD (peripheral vascular disease)    Special screening for malignant neoplasms, colon    S/P femoral-popliteal bypass surgery    S/P peripheral artery angioplasty    Type 2 diabetes mellitus with diabetic neuropathy     Osteomyelitis of toe of left foot    CKD (chronic kidney disease) stage 3, GFR 30-59 ml/min    Pleural effusion on right    Thrombocytopenia    Chronic diastolic heart failure    Acute on chronic respiratory failure    COPD (chronic obstructive pulmonary disease) with acute bronchitis    Lymphadenopathy, mediastinal    Respiratory failure, acute and chronic       Medication List with Changes/Refills   Current Medications    ALBUTEROL (PROVENTIL/VENTOLIN HFA) 90 MCG/ACTUATION INHALER    Inhale 1-2 puffs into the lungs every 4 (four) hours as needed for Wheezing or Shortness of Breath (cough).    ALBUTEROL-IPRATROPIUM (DUO-NEB) 2.5 MG-0.5 MG/3 ML NEBULIZER SOLUTION    Take 3 mLs by nebulization every 4 (four) hours as needed for Wheezing. Rescue    ATENOLOL (TENORMIN) 100 MG TABLET    TAKE 1 TABLET BY MOUTH ONCE DAILY    BLOOD-GLUCOSE METER KIT    To check BG 2 times daily, to use with insurance preferred meter    CLOPIDOGREL (PLAVIX) 75 MG TABLET    TAKE 1 TABLET BY MOUTH  DAILY    FENOFIBRATE MICRONIZED (LOFIBRA) 134 MG CAP    Take 1 capsule (134 mg total) by mouth once daily.    FLUOROURACIL (EFUDEX) 5 % CREAM        FUROSEMIDE (LASIX) 20 MG TABLET    Take 1 tablet (20 mg total) by mouth once daily.    GABAPENTIN (NEURONTIN) 600 MG TABLET    TAKE 2 TABLETS BY MOUTH TWO TIMES DAILY    GLIPIZIDE-METFORMIN (METAGLIP) 2.5-500 MG PER TABLET    TAKE 2 TABLETS BY MOUTH  TWICE A DAY BEFORE MEALS IN THE MORNING AND EVENING    GUAIFENESIN (HUMIBID E) 400 MG TAB    Take 400 mg by mouth 3 (three) times daily as needed.    IMIQUIMOD (ALDARA) 5 % CREAM        LANCETS MISC    To check BG 2 times daily, to use with insurance preferred meter    LISINOPRIL-HYDROCHLOROTHIAZIDE (PRINZIDE,ZESTORETIC) 20-12.5 MG PER TABLET    TAKE 2 TABLETS BY MOUTH  ONCE DAILY    ONETOUCH DELICA LANCETS 30 GAUGE MISC    USE TWO TIMES DAILY AND AS  NEEDED    ONETOUCH ULTRA BLUE TEST STRIP STRP    USE TWO TIMES DAILY AND AS  NEEDED     ROSUVASTATIN (CRESTOR) 40 MG TAB    TAKE 1 TABLET BY MOUTH ONCE DAILY    WARFARIN (COUMADIN) 5 MG TABLET    TAKE 1 AND 1/2 TABLETS BY  MOUTH ON WEDNESDAY AND   AND 1 TABLET ALL  REMAINING DAYS AS DIRECTED  BY THE COUMADIN CLINIC.    WARFARIN (COUMADIN) 5 MG TABLET    Take 1 tablet (5 mg total) by mouth Daily.       Review of patient's allergies indicates:   Allergen Reactions    Sulfa (sulfonamide antibiotics)      Other reaction(s): Stomach upset    Sulfamethoxazole Rash    Trimethoprim Rash     Bactrim         Past Surgical History:   Procedure Laterality Date    AMPUTATION- TOE Left 2017    [facility]    CARDIAC ELECTROPHYSIOLOGY MAPPING AND ABLATION      COLONOSCOPY N/A 2014    Performed by Yinka Clifford MD at [facility] ENDO    POPLITEAL ARTERY STENT  2013    RECTOPERITONEAL FISTULA CLOSURE      right leg bipass      THORACENTESIS Right 2018    Performed by Princess Schaffer MD at [facility] ENDO    TOE SURGERY      Joint release    TONSILLECTOMY         History reviewed. No pertinent family history.    Social History     Socioeconomic History    Marital status:      Spouse name: Not on file    Number of children: Not on file    Years of education: Not on file    Highest education level: Not on file   Occupational History    Occupation: Retired      Comment: [id number]   Social Needs    Financial resource strain: Not on file    Food insecurity:     Worry: Not on file     Inability: Not on file    Transportation needs:     Medical: Not on file     Non-medical: Not on file   Tobacco Use    Smoking status: Former Smoker     Packs/day: 4.00     Years: 30.00     Pack years: 120.00     Types: Cigarettes     Last attempt to quit: 1994     Years since quittin.4    Smokeless tobacco: Never Used   Substance and Sexual Activity    Alcohol use: No    Drug use: No    Sexual activity: Not on file   Lifestyle    Physical activity:      "Days per week: Not on file     Minutes per session: Not on file    Stress: Not on file   Relationships    Social connections:     Talks on phone: Not on file     Gets together: Not on file     Attends [employer] service: Not on file     Active member of club or organization: Not on file     Attends meetings of clubs or organizations: Not on file     Relationship status: Not on file   Other Topics Concern    Not on file   Social History Narrative    Not on file       Vitals:    06/03/19 1135   BP: 127/67   Pulse: 90   Weight: 111.6 kg (246 lb)   Height: 5' 6" (1.676 m)   PainSc: 0-No pain   PainLoc: Foot       Hemoglobin A1C   Date Value Ref Range Status   11/23/2018 6.3 (H) 4.0 - 5.6 % Final     Comment:     ADA Screening Guidelines:  5.7-6.4%  Consistent with prediabetes  >or=6.5%  Consistent with diabetes  High levels of fetal hemoglobin interfere with the HbA1C  assay. Heterozygous hemoglobin variants (HbS, HgC, etc)do  not significantly interfere with this assay.   However, presence of multiple variants may affect accuracy.     05/23/2018 7.1 (H) 4.0 - 5.6 % Final     Comment:     According to ADA guidelines, hemoglobin A1c <7.0% represents  optimal control in non-pregnant diabetic patients. Different  metrics may apply to specific patient populations.   Standards of Medical Care in Diabetes-2016.  For the purpose of screening for the presence of diabetes:  <5.7%     Consistent with the absence of diabetes  5.7-6.4%  Consistent with increasing risk for diabetes   (prediabetes)  >or=6.5%  Consistent with diabetes  Currently, no consensus exists for use of hemoglobin A1c  for diagnosis of diabetes for children.  This Hemoglobin A1c assay has significant interference with fetal   hemoglobin   (HbF). The results are invalid for patients with abnormal amounts of   HbF,   including those with known Hereditary Persistence   of Fetal Hemoglobin. Heterozygous hemoglobin variants (HbAS, HbAC,   HbAD, HbAE, HbA2) do not " significantly interfere with this assay;   however, presence of multiple variants in a sample may impact the %   interference.     11/15/2017 6.8 (H) 4.0 - 5.6 % Final     Comment:     According to ADA guidelines, hemoglobin A1c <7.0% represents  optimal control in non-pregnant diabetic patients. Different  metrics may apply to specific patient populations.   Standards of Medical Care in Diabetes-2016.  For the purpose of screening for the presence of diabetes:  <5.7%     Consistent with the absence of diabetes  5.7-6.4%  Consistent with increasing risk for diabetes   (prediabetes)  >or=6.5%  Consistent with diabetes  Currently, no consensus exists for use of hemoglobin A1c  for diagnosis of diabetes for children.  This Hemoglobin A1c assay has significant interference with fetal   hemoglobin   (HbF). The results are invalid for patients with abnormal amounts of   HbF,   including those with known Hereditary Persistence   of Fetal Hemoglobin. Heterozygous hemoglobin variants (HbAS, HbAC,   HbAD, HbAE, HbA2) do not significantly interfere with this assay;   however, presence of multiple variants in a sample may impact the %   interference.         Review of Systems   Constitutional: Negative for chills and fever.   Respiratory: Negative for shortness of breath.    Cardiovascular: Negative for chest pain, palpitations, orthopnea, claudication and leg swelling.   Gastrointestinal: Negative for diarrhea, nausea and vomiting.   Musculoskeletal: Negative for joint pain.   Skin: Negative for rash.   Neurological: Positive for sensory change. Negative for dizziness, tingling, focal weakness and weakness.   Psychiatric/Behavioral: Negative.          Objective:      PHYSICAL EXAM: Apperance: Alert and orient in no distress,well developed, and with good attention to grooming and body habits  Patient presents ambulating in diabetic shoes and inserts.  Lower Extremity Exam  VASCULAR: Dorsalis pedis pulses 1/4 bilateral and  Posterior Tibial pulses 1/4 bilateral. Capillary fill time <4 seconds bilateral. No edema observed bilateral. Varicosities present bilateral. Skin temperature of the lower extremities is warm to warm, proximal to distal. Hair growth absent bilateral. (--) lymphangitis or (--) cellulitis noted bilateral.  DERMATOLOGICAL: (--) edema, (--) erythema, (--) malodor, (-) drainage, (--) warmth to left foot. Left hallux nail absent. Nails 2,3,4 left and 2,3,4,5 right elongated, thickened, and discolored with subungual debris. The dorsum surface of the feet are soft and supple.  The plantar aspects of both feet are dry and scaly. Webspaces 1,2,3,4 clean, dry and without evidence of break in skin integrity bilateral. Thin hyperkeratotic tissue noted to right partial hallux and plantar left 5th submetatarsal.   NEUROLOGICAL: Light touch, sharp-dull, proprioception all present and equal bilaterally.    MUSCULOSKELETAL: Muscle strength is 5/5 for foot inverters, everters, plantarflexors, and dorsiflexors. Muscle tone is normal. 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        Assessment:       Encounter Diagnoses   Name Primary?    Dermatophytosis of nail Yes    Pre-ulcerative calluses     PVD (peripheral vascular disease)     Type II diabetes mellitus with peripheral circulatory disorder          Plan:   Dermatophytosis of nail    Pre-ulcerative calluses    PVD (peripheral vascular disease)    Type II diabetes mellitus with peripheral circulatory disorder      I counseled the patient on his conditions, regarding findings of my examination, my impressions, and usual treatment plan.   Greater than 20 minutes spent on education about the PVD, and prevention of limb loss.  Shoe inspection. Patient instructed on proper foot hygeine. We discussed wearing proper shoe gear, daily foot inspections, never walking without protective shoe gear, never putting sharp instruments to feet.    With patient's permission, nails as noted above bilateral were  trimmed in length and thickness aggressively to their soft tissue attachment mechanically and with electric , removing all offending nail and debris. Patient relates relief following the procedure.  With patient's permission, bilateral callus trimmed in thickness with #15 blade in thickness without incident.   Patient  will continue to monitor the areas daily, inspect feet, wear protective shoe gear when ambulatory, moisturizer to maintain skin integrity.   Patient to return 2 months or sooner if needed.                  Nic Castillo DPM  [facility]

## 2019-06-27 DIAGNOSIS — I73.9 PVD (PERIPHERAL VASCULAR DISEASE): ICD-10-CM

## 2019-06-28 RX ORDER — CLOPIDOGREL BISULFATE 75 MG/1
TABLET ORAL
Qty: 90 TABLET | Refills: 3 | Status: SHIPPED | OUTPATIENT
Start: 2019-06-28

## 2019-07-28 DIAGNOSIS — R80.9 TYPE 2 DIABETES MELLITUS WITH MICROALBUMINURIA, WITHOUT LONG-TERM CURRENT USE OF INSULIN: ICD-10-CM

## 2019-07-28 DIAGNOSIS — E11.29 TYPE 2 DIABETES MELLITUS WITH MICROALBUMINURIA, WITHOUT LONG-TERM CURRENT USE OF INSULIN: ICD-10-CM

## 2019-07-28 DIAGNOSIS — I10 ESSENTIAL HYPERTENSION: ICD-10-CM

## 2019-07-29 RX ORDER — LISINOPRIL AND HYDROCHLOROTHIAZIDE 12.5; 2 MG/1; MG/1
2 TABLET ORAL DAILY
Qty: 180 TABLET | Refills: 0 | Status: SHIPPED | OUTPATIENT
Start: 2019-07-29 | End: 2019-08-06 | Stop reason: SDUPTHER

## 2019-07-29 NOTE — TELEPHONE ENCOUNTER
Advised pt refill approved by Dr. Cristobal but he needs f/u, pt voiced understanding but pt reports he has established PCP w/ Dr. Gomez and is seeing her for care now. Instructed pt to advised his pharmacy to send all future refill requests to his new PCP Dr. Gomez, pt voiced understanding.

## 2019-08-06 DIAGNOSIS — E11.29 TYPE 2 DIABETES MELLITUS WITH MICROALBUMINURIA, WITHOUT LONG-TERM CURRENT USE OF INSULIN: ICD-10-CM

## 2019-08-06 DIAGNOSIS — R80.9 TYPE 2 DIABETES MELLITUS WITH MICROALBUMINURIA, WITHOUT LONG-TERM CURRENT USE OF INSULIN: ICD-10-CM

## 2019-08-06 DIAGNOSIS — I10 ESSENTIAL HYPERTENSION: ICD-10-CM

## 2019-08-06 RX ORDER — LISINOPRIL AND HYDROCHLOROTHIAZIDE 12.5; 2 MG/1; MG/1
2 TABLET ORAL DAILY
Qty: 180 TABLET | Refills: 3 | Status: SHIPPED | OUTPATIENT
Start: 2019-08-06

## 2019-08-14 ENCOUNTER — OFFICE VISIT (OUTPATIENT)
Dept: PODIATRY | Facility: CLINIC | Age: 79
End: 2019-08-14
Payer: MEDICARE

## 2019-08-14 VITALS
SYSTOLIC BLOOD PRESSURE: 136 MMHG | HEIGHT: 66 IN | DIASTOLIC BLOOD PRESSURE: 68 MMHG | BODY MASS INDEX: 38.94 KG/M2 | WEIGHT: 242.31 LBS | HEART RATE: 89 BPM

## 2019-08-14 DIAGNOSIS — E11.51 TYPE II DIABETES MELLITUS WITH PERIPHERAL CIRCULATORY DISORDER: ICD-10-CM

## 2019-08-14 DIAGNOSIS — Z89.422 H/O AMPUTATION OF LESSER TOE, LEFT: ICD-10-CM

## 2019-08-14 DIAGNOSIS — L84 PRE-ULCERATIVE CALLUSES: ICD-10-CM

## 2019-08-14 DIAGNOSIS — B35.1 DERMATOPHYTOSIS OF NAIL: Primary | ICD-10-CM

## 2019-08-14 DIAGNOSIS — I73.9 PVD (PERIPHERAL VASCULAR DISEASE): ICD-10-CM

## 2019-08-14 PROCEDURE — 11056 PR TRIM BENIGN HYPERKERATOTIC SKIN LESION,2-4: ICD-10-PCS | Mod: Q7,S$GLB,, | Performed by: PODIATRIST

## 2019-08-14 PROCEDURE — 11721 PR DEBRIDEMENT OF NAILS, 6 OR MORE: ICD-10-PCS | Mod: Q7,59,S$GLB, | Performed by: PODIATRIST

## 2019-08-14 PROCEDURE — 99499 UNLISTED E&M SERVICE: CPT | Mod: S$GLB,,, | Performed by: PODIATRIST

## 2019-08-14 PROCEDURE — 99999 PR PBB SHADOW E&M-EST. PATIENT-LVL III: ICD-10-PCS | Mod: PBBFAC,,, | Performed by: PODIATRIST

## 2019-08-14 PROCEDURE — 99999 PR PBB SHADOW E&M-EST. PATIENT-LVL III: CPT | Mod: PBBFAC,,, | Performed by: PODIATRIST

## 2019-08-14 PROCEDURE — 99499 NO LOS: ICD-10-PCS | Mod: S$GLB,,, | Performed by: PODIATRIST

## 2019-08-14 PROCEDURE — 11721 DEBRIDE NAIL 6 OR MORE: CPT | Mod: Q7,59,S$GLB, | Performed by: PODIATRIST

## 2019-08-14 PROCEDURE — 11056 PARNG/CUTG B9 HYPRKR LES 2-4: CPT | Mod: Q7,S$GLB,, | Performed by: PODIATRIST

## 2019-08-14 NOTE — PROGRESS NOTES
Subjective:     Patient ID: Raymond Stuart is a 79 y.o. male.    Chief Complaint: Nail Care ([facility]. c/o no pain. Diabetic Pt. Wears causal shoes w/ socks. PCP DR Gomez, last visit 07/11/19)    Raymond is a 79 y.o. male who presents to the clinic for evaluation and treatment of high risk feet. Raymond has a past medical history of A-fib, Diabetes mellitus, type 2, Hyperlipidemia, Hypertension, Irregular heartbeat, PVD (peripheral vascular disease), S/P femoral-popliteal bypass surgery (8/8/2014), and S/P peripheral artery angioplasty (8/8/2014). The patient's chief complaint is foot check and routine nail care.  This patient has documented high risk feet requiring routine maintenance secondary to peripheral vascular disease. Patient states he saw Dr. Judge a couple of weeks ago and there are no blockages noted.     PCP: Eran Gomez MD    Date Last Seen by PCP: 07/11/19    Current shoe gear:  Affected Foot: Rx diabetic extra depth shoes and custom accommodative insoles     Unaffected Foot: Rx diabetic extra depth shoes and custom accommodative insoles    History of Trauma: negative  Sign of Infection: none    Hemoglobin A1C   Date Value Ref Range Status   11/23/2018 6.3 (H) 4.0 - 5.6 % Final     Comment:     ADA Screening Guidelines:  5.7-6.4%  Consistent with prediabetes  >or=6.5%  Consistent with diabetes  High levels of fetal hemoglobin interfere with the HbA1C  assay. Heterozygous hemoglobin variants (HbS, HgC, etc)do  not significantly interfere with this assay.   However, presence of multiple variants may affect accuracy.     05/23/2018 7.1 (H) 4.0 - 5.6 % Final     Comment:     According to ADA guidelines, hemoglobin A1c <7.0% represents  optimal control in non-pregnant diabetic patients. Different  metrics may apply to specific patient populations.   Standards of Medical Care in Diabetes-2016.  For the purpose of screening for the presence of diabetes:  <5.7%     Consistent with the absence of diabetes  5.7-6.4%   Consistent with increasing risk for diabetes   (prediabetes)  >or=6.5%  Consistent with diabetes  Currently, no consensus exists for use of hemoglobin A1c  for diagnosis of diabetes for children.  This Hemoglobin A1c assay has significant interference with fetal   hemoglobin   (HbF). The results are invalid for patients with abnormal amounts of   HbF,   including those with known Hereditary Persistence   of Fetal Hemoglobin. Heterozygous hemoglobin variants (HbAS, HbAC,   HbAD, HbAE, HbA2) do not significantly interfere with this assay;   however, presence of multiple variants in a sample may impact the %   interference.     11/15/2017 6.8 (H) 4.0 - 5.6 % Final     Comment:     According to ADA guidelines, hemoglobin A1c <7.0% represents  optimal control in non-pregnant diabetic patients. Different  metrics may apply to specific patient populations.   Standards of Medical Care in Diabetes-2016.  For the purpose of screening for the presence of diabetes:  <5.7%     Consistent with the absence of diabetes  5.7-6.4%  Consistent with increasing risk for diabetes   (prediabetes)  >or=6.5%  Consistent with diabetes  Currently, no consensus exists for use of hemoglobin A1c  for diagnosis of diabetes for children.  This Hemoglobin A1c assay has significant interference with fetal   hemoglobin   (HbF). The results are invalid for patients with abnormal amounts of   HbF,   including those with known Hereditary Persistence   of Fetal Hemoglobin. Heterozygous hemoglobin variants (HbAS, HbAC,   HbAD, HbAE, HbA2) do not significantly interfere with this assay;   however, presence of multiple variants in a sample may impact the %   interference.             Patient Active Problem List   Diagnosis    Hyperlipidemia associated with type 2 diabetes mellitus    Essential hypertension    A-fib    PVD (peripheral vascular disease)    Special screening for malignant neoplasms, colon    S/P femoral-popliteal bypass surgery    S/P  peripheral artery angioplasty    Type 2 diabetes mellitus with diabetic neuropathy    Osteomyelitis of toe of left foot    CKD (chronic kidney disease) stage 3, GFR 30-59 ml/min    Pleural effusion on right    Thrombocytopenia    Chronic diastolic heart failure    Acute on chronic respiratory failure    COPD (chronic obstructive pulmonary disease) with acute bronchitis    Lymphadenopathy, mediastinal    Respiratory failure, acute and chronic       Medication List with Changes/Refills   Current Medications    ALBUTEROL (PROVENTIL/VENTOLIN HFA) 90 MCG/ACTUATION INHALER    Inhale 1-2 puffs into the lungs every 4 (four) hours as needed for Wheezing or Shortness of Breath (cough).    ALBUTEROL-IPRATROPIUM (DUO-NEB) 2.5 MG-0.5 MG/3 ML NEBULIZER SOLUTION    Take 3 mLs by nebulization every 4 (four) hours as needed for Wheezing. Rescue    ATENOLOL (TENORMIN) 100 MG TABLET    TAKE 1 TABLET BY MOUTH ONCE DAILY    BLOOD-GLUCOSE METER KIT    To check BG 2 times daily, to use with insurance preferred meter    CLOPIDOGREL (PLAVIX) 75 MG TABLET    TAKE 1 TABLET BY MOUTH  DAILY    FENOFIBRATE MICRONIZED (LOFIBRA) 134 MG CAP    Take 1 capsule (134 mg total) by mouth once daily.    FLUOROURACIL (EFUDEX) 5 % CREAM        FUROSEMIDE (LASIX) 20 MG TABLET    Take 1 tablet (20 mg total) by mouth once daily.    GABAPENTIN (NEURONTIN) 600 MG TABLET    TAKE 2 TABLETS BY MOUTH TWO TIMES DAILY    GLIPIZIDE-METFORMIN (METAGLIP) 2.5-500 MG PER TABLET    TAKE 2 TABLETS BY MOUTH  TWICE A DAY BEFORE MEALS IN THE MORNING AND EVENING    GUAIFENESIN (HUMIBID E) 400 MG TAB    Take 400 mg by mouth 3 (three) times daily as needed.    IMIQUIMOD (ALDARA) 5 % CREAM        LANCETS MISC    To check BG 2 times daily, to use with insurance preferred meter    LISINOPRIL-HYDROCHLOROTHIAZIDE (PRINZIDE,ZESTORETIC) 20-12.5 MG PER TABLET    TAKE 2 TABLETS BY MOUTH  ONCE DAILY    ONETOUCH DELICA LANCETS 30 GAUGE MISC    USE TWO TIMES DAILY AND AS  NEEDED     ONETOUCH ULTRA BLUE TEST STRIP STRP    USE TWO TIMES DAILY    ROSUVASTATIN (CRESTOR) 40 MG TAB    TAKE 1 TABLET BY MOUTH ONCE DAILY    WARFARIN (COUMADIN) 5 MG TABLET    TAKE 1 AND 1/2 TABLETS BY  MOUTH ON WEDNESDAY AND   AND 1 TABLET ALL  REMAINING DAYS AS DIRECTED  BY THE COUMADIN CLINIC.    WARFARIN (COUMADIN) 5 MG TABLET    Take 1 tablet (5 mg total) by mouth Daily.       Review of patient's allergies indicates:   Allergen Reactions    Sulfa (sulfonamide antibiotics)      Other reaction(s): Stomach upset    Sulfamethoxazole Rash    Trimethoprim Rash     Bactrim         Past Surgical History:   Procedure Laterality Date    AMPUTATION- TOE Left 2017    Performed by [facility] at [facility]    CARDIAC ELECTROPHYSIOLOGY MAPPING AND ABLATION      COLONOSCOPY N/A 2014    Performed by Yinka Clifford MD at [facility]    POPLITEAL ARTERY STENT  2013    RECTOPERITONEAL FISTULA CLOSURE      right leg bipass      THORACENTESIS Right 2018    Performed by Princess Schaffer MD at [facility]    TOE SURGERY      Joint release    TONSILLECTOMY         History reviewed. No pertinent family history.    Social History     Socioeconomic History    Marital status:      Spouse name: Not on file    Number of children: Not on file    Years of education: Not on file    Highest education level: Not on file   Occupational History    Occupation: Retired      Comment: [id number]   Social Needs    Financial resource strain: Not on file    Food insecurity:     Worry: Not on file     Inability: Not on file    Transportation needs:     Medical: Not on file     Non-medical: Not on file   Tobacco Use    Smoking status: Former Smoker     Packs/day: 4.00     Years: 30.00     Pack years: 120.00     Types: Cigarettes     Last attempt to quit: 1994     Years since quittin.7    Smokeless tobacco: Never Used   Substance and Sexual Activity    Alcohol use: No    Drug use: No    Sexual  "activity: Not on file   Lifestyle    Physical activity:     Days per week: Not on file     Minutes per session: Not on file    Stress: Not on file   Relationships    Social connections:     Talks on phone: Not on file     Gets together: Not on file     Attends [employer] service: Not on file     Active member of club or organization: Not on file     Attends meetings of clubs or organizations: Not on file     Relationship status: Not on file   Other Topics Concern    Not on file   Social History Narrative    Not on file       Vitals:    08/14/19 0811   BP: 136/68   Pulse: 89   Weight: 109.9 kg (242 lb 4.6 oz)   Height: 5' 6" (1.676 m)   PainSc: 0-No pain       Hemoglobin A1C   Date Value Ref Range Status   11/23/2018 6.3 (H) 4.0 - 5.6 % Final     Comment:     ADA Screening Guidelines:  5.7-6.4%  Consistent with prediabetes  >or=6.5%  Consistent with diabetes  High levels of fetal hemoglobin interfere with the HbA1C  assay. Heterozygous hemoglobin variants (HbS, HgC, etc)do  not significantly interfere with this assay.   However, presence of multiple variants may affect accuracy.     05/23/2018 7.1 (H) 4.0 - 5.6 % Final     Comment:     According to ADA guidelines, hemoglobin A1c <7.0% represents  optimal control in non-pregnant diabetic patients. Different  metrics may apply to specific patient populations.   Standards of Medical Care in Diabetes-2016.  For the purpose of screening for the presence of diabetes:  <5.7%     Consistent with the absence of diabetes  5.7-6.4%  Consistent with increasing risk for diabetes   (prediabetes)  >or=6.5%  Consistent with diabetes  Currently, no consensus exists for use of hemoglobin A1c  for diagnosis of diabetes for children.  This Hemoglobin A1c assay has significant interference with fetal   hemoglobin   (HbF). The results are invalid for patients with abnormal amounts of   HbF,   including those with known Hereditary Persistence   of Fetal Hemoglobin. Heterozygous " hemoglobin variants (HbAS, HbAC,   HbAD, HbAE, HbA2) do not significantly interfere with this assay;   however, presence of multiple variants in a sample may impact the %   interference.     11/15/2017 6.8 (H) 4.0 - 5.6 % Final     Comment:     According to ADA guidelines, hemoglobin A1c <7.0% represents  optimal control in non-pregnant diabetic patients. Different  metrics may apply to specific patient populations.   Standards of Medical Care in Diabetes-2016.  For the purpose of screening for the presence of diabetes:  <5.7%     Consistent with the absence of diabetes  5.7-6.4%  Consistent with increasing risk for diabetes   (prediabetes)  >or=6.5%  Consistent with diabetes  Currently, no consensus exists for use of hemoglobin A1c  for diagnosis of diabetes for children.  This Hemoglobin A1c assay has significant interference with fetal   hemoglobin   (HbF). The results are invalid for patients with abnormal amounts of   HbF,   including those with known Hereditary Persistence   of Fetal Hemoglobin. Heterozygous hemoglobin variants (HbAS, HbAC,   HbAD, HbAE, HbA2) do not significantly interfere with this assay;   however, presence of multiple variants in a sample may impact the %   interference.         Review of Systems   Constitutional: Negative for chills and fever.   Respiratory: Negative for shortness of breath.    Cardiovascular: Negative for chest pain, palpitations, orthopnea, claudication and leg swelling.   Gastrointestinal: Negative for diarrhea, nausea and vomiting.   Musculoskeletal: Negative for joint pain.   Skin: Negative for rash.   Neurological: Positive for sensory change. Negative for dizziness, tingling, focal weakness and weakness.   Psychiatric/Behavioral: Negative.          Objective:      PHYSICAL EXAM: Apperance: Alert and orient in no distress,well developed, and with good attention to grooming and body habits  Patient presents ambulating in diabetic shoes and inserts.  Lower Extremity  Exam  VASCULAR: Dorsalis pedis pulses 1/4 bilateral and Posterior Tibial pulses 1/4 bilateral. Capillary fill time <4 seconds bilateral. No edema observed bilateral. Varicosities present bilateral. Skin temperature of the lower extremities is warm to warm, proximal to distal. Hair growth absent bilateral. (--) lymphangitis or (--) cellulitis noted bilateral.  DERMATOLOGICAL: (--) edema, (--) erythema, (--) malodor, (-) drainage, (--) warmth to left foot. Left hallux nail absent. Nails 2,3,4 left and 2,3,4,5 right elongated, thickened, and discolored with subungual debris. The dorsum surface of the feet are soft and supple.  The plantar aspects of both feet are dry and scaly. Webspaces 1,2,3,4 clean, dry and without evidence of break in skin integrity bilateral. Thin hyperkeratotic tissue noted to right partial hallux and plantar left 5th submetatarsal.   NEUROLOGICAL: Light touch, sharp-dull, proprioception all present and equal bilaterally.    MUSCULOSKELETAL: Muscle strength is 5/5 for foot inverters, everters, plantarflexors, and dorsiflexors. Muscle tone is normal. 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        Assessment:       Encounter Diagnoses   Name Primary?    Dermatophytosis of nail Yes    Pre-ulcerative calluses     PVD (peripheral vascular disease)     Type II diabetes mellitus with peripheral circulatory disorder     H/O amputation of lesser toe, left          Plan:   Dermatophytosis of nail    Pre-ulcerative calluses    PVD (peripheral vascular disease)    Type II diabetes mellitus with peripheral circulatory disorder    H/O amputation of lesser toe, left      I counseled the patient on his conditions, regarding findings of my examination, my impressions, and usual treatment plan.   Greater than 20 minutes spent on education about the PVD, and prevention of limb loss.  Shoe inspection. Patient instructed on proper foot hygeine. We discussed wearing proper shoe gear, daily foot inspections, never walking  without protective shoe gear, never putting sharp instruments to feet.    With patient's permission, nails as noted above bilateral were trimmed in length and thickness aggressively to their soft tissue attachment mechanically and with electric , removing all offending nail and debris. Patient relates relief following the procedure.  With patient's permission, bilateral callus trimmed in thickness with #15 blade in thickness without incident.   Patient  will continue to monitor the areas daily, inspect feet, wear protective shoe gear when ambulatory, moisturizer to maintain skin integrity.   Patient to return 2 months or sooner if needed.                  Nic Castillo DPM  [facility]

## 2019-10-14 ENCOUNTER — OFFICE VISIT (OUTPATIENT)
Dept: PODIATRY | Facility: CLINIC | Age: 79
End: 2019-10-14
Payer: MEDICARE

## 2019-10-14 VITALS
DIASTOLIC BLOOD PRESSURE: 62 MMHG | SYSTOLIC BLOOD PRESSURE: 132 MMHG | BODY MASS INDEX: 38.89 KG/M2 | WEIGHT: 242 LBS | HEIGHT: 66 IN | HEART RATE: 77 BPM

## 2019-10-14 DIAGNOSIS — I73.9 PVD (PERIPHERAL VASCULAR DISEASE): ICD-10-CM

## 2019-10-14 DIAGNOSIS — L84 PRE-ULCERATIVE CALLUSES: ICD-10-CM

## 2019-10-14 DIAGNOSIS — E11.51 TYPE II DIABETES MELLITUS WITH PERIPHERAL CIRCULATORY DISORDER: ICD-10-CM

## 2019-10-14 DIAGNOSIS — B35.1 DERMATOPHYTOSIS OF NAIL: Primary | ICD-10-CM

## 2019-10-14 PROCEDURE — 11056 PR TRIM BENIGN HYPERKERATOTIC SKIN LESION,2-4: ICD-10-PCS | Mod: Q7,S$GLB,, | Performed by: PODIATRIST

## 2019-10-14 PROCEDURE — 11721 PR DEBRIDEMENT OF NAILS, 6 OR MORE: ICD-10-PCS | Mod: 59,Q7,S$GLB, | Performed by: PODIATRIST

## 2019-10-14 PROCEDURE — 11056 PARNG/CUTG B9 HYPRKR LES 2-4: CPT | Mod: Q7,S$GLB,, | Performed by: PODIATRIST

## 2019-10-14 PROCEDURE — 11721 DEBRIDE NAIL 6 OR MORE: CPT | Mod: 59,Q7,S$GLB, | Performed by: PODIATRIST

## 2019-10-14 PROCEDURE — 99499 UNLISTED E&M SERVICE: CPT | Mod: S$GLB,,, | Performed by: PODIATRIST

## 2019-10-14 PROCEDURE — 99499 NO LOS: ICD-10-PCS | Mod: S$GLB,,, | Performed by: PODIATRIST

## 2019-10-14 PROCEDURE — 99999 PR PBB SHADOW E&M-EST. PATIENT-LVL III: ICD-10-PCS | Mod: PBBFAC,,, | Performed by: PODIATRIST

## 2019-10-14 PROCEDURE — 99999 PR PBB SHADOW E&M-EST. PATIENT-LVL III: CPT | Mod: PBBFAC,,, | Performed by: PODIATRIST

## 2019-10-14 NOTE — PROGRESS NOTES
Subjective:     Patient ID: Raymond Stuart is a 79 y.o. male.    Chief Complaint: Nail Care (no c/o pain. wears diabetic shoes with socks. diabetic Pt. PCP Dr. Gomez.)    Raymond is a 79 y.o. male who presents to the clinic for evaluation and treatment of high risk feet. Raymond has a past medical history of A-fib, Diabetes mellitus, type 2, Hyperlipidemia, Hypertension, Irregular heartbeat, PVD (peripheral vascular disease), S/P femoral-popliteal bypass surgery (8/8/2014), and S/P peripheral artery angioplasty (8/8/2014). The patient's chief complaint is foot check and routine nail care.  This patient has documented high risk feet requiring routine maintenance secondary to peripheral vascular disease. Patient states he saw Dr. Judge a couple of weeks ago and there are no blockages noted.     PCP: Eran Gomez MD    Date Last Seen by PCP: 07/11/19    Current shoe gear:  Affected Foot: Rx diabetic extra depth shoes and custom accommodative insoles     Unaffected Foot: Rx diabetic extra depth shoes and custom accommodative insoles    History of Trauma: negative  Sign of Infection: none    Hemoglobin A1C   Date Value Ref Range Status   11/23/2018 6.3 (H) 4.0 - 5.6 % Final     Comment:     ADA Screening Guidelines:  5.7-6.4%  Consistent with prediabetes  >or=6.5%  Consistent with diabetes  High levels of fetal hemoglobin interfere with the HbA1C  assay. Heterozygous hemoglobin variants (HbS, HgC, etc)do  not significantly interfere with this assay.   However, presence of multiple variants may affect accuracy.     05/23/2018 7.1 (H) 4.0 - 5.6 % Final     Comment:     According to ADA guidelines, hemoglobin A1c <7.0% represents  optimal control in non-pregnant diabetic patients. Different  metrics may apply to specific patient populations.   Standards of Medical Care in Diabetes-2016.  For the purpose of screening for the presence of diabetes:  <5.7%     Consistent with the absence of diabetes  5.7-6.4%  Consistent with  increasing risk for diabetes   (prediabetes)  >or=6.5%  Consistent with diabetes  Currently, no consensus exists for use of hemoglobin A1c  for diagnosis of diabetes for children.  This Hemoglobin A1c assay has significant interference with fetal   hemoglobin   (HbF). The results are invalid for patients with abnormal amounts of   HbF,   including those with known Hereditary Persistence   of Fetal Hemoglobin. Heterozygous hemoglobin variants (HbAS, HbAC,   HbAD, HbAE, HbA2) do not significantly interfere with this assay;   however, presence of multiple variants in a sample may impact the %   interference.     11/15/2017 6.8 (H) 4.0 - 5.6 % Final     Comment:     According to ADA guidelines, hemoglobin A1c <7.0% represents  optimal control in non-pregnant diabetic patients. Different  metrics may apply to specific patient populations.   Standards of Medical Care in Diabetes-2016.  For the purpose of screening for the presence of diabetes:  <5.7%     Consistent with the absence of diabetes  5.7-6.4%  Consistent with increasing risk for diabetes   (prediabetes)  >or=6.5%  Consistent with diabetes  Currently, no consensus exists for use of hemoglobin A1c  for diagnosis of diabetes for children.  This Hemoglobin A1c assay has significant interference with fetal   hemoglobin   (HbF). The results are invalid for patients with abnormal amounts of   HbF,   including those with known Hereditary Persistence   of Fetal Hemoglobin. Heterozygous hemoglobin variants (HbAS, HbAC,   HbAD, HbAE, HbA2) do not significantly interfere with this assay;   however, presence of multiple variants in a sample may impact the %   interference.             Patient Active Problem List   Diagnosis    Hyperlipidemia associated with type 2 diabetes mellitus    Essential hypertension    A-fib    PVD (peripheral vascular disease)    Special screening for malignant neoplasms, colon    S/P femoral-popliteal bypass surgery    S/P peripheral artery  angioplasty    Type 2 diabetes mellitus with diabetic neuropathy    Osteomyelitis of toe of left foot    CKD (chronic kidney disease) stage 3, GFR 30-59 ml/min    Pleural effusion on right    Thrombocytopenia    Chronic diastolic heart failure    Acute on chronic respiratory failure    COPD (chronic obstructive pulmonary disease) with acute bronchitis    Lymphadenopathy, mediastinal    Respiratory failure, acute and chronic       Medication List with Changes/Refills   Current Medications    ALBUTEROL (PROVENTIL/VENTOLIN HFA) 90 MCG/ACTUATION INHALER    Inhale 1-2 puffs into the lungs every 4 (four) hours as needed for Wheezing or Shortness of Breath (cough).    ALBUTEROL-IPRATROPIUM (DUO-NEB) 2.5 MG-0.5 MG/3 ML NEBULIZER SOLUTION    Take 3 mLs by nebulization every 4 (four) hours as needed for Wheezing. Rescue    ATENOLOL (TENORMIN) 100 MG TABLET    TAKE 1 TABLET BY MOUTH ONCE DAILY    BLOOD-GLUCOSE METER KIT    To check BG 2 times daily, to use with insurance preferred meter    CLOPIDOGREL (PLAVIX) 75 MG TABLET    TAKE 1 TABLET BY MOUTH  DAILY    FENOFIBRATE MICRONIZED (LOFIBRA) 134 MG CAP    Take 1 capsule (134 mg total) by mouth once daily.    FLUOROURACIL (EFUDEX) 5 % CREAM        FUROSEMIDE (LASIX) 20 MG TABLET    Take 1 tablet (20 mg total) by mouth once daily.    GABAPENTIN (NEURONTIN) 600 MG TABLET    TAKE 2 TABLETS BY MOUTH TWO TIMES DAILY    GLIPIZIDE-METFORMIN (METAGLIP) 2.5-500 MG PER TABLET    TAKE 2 TABLETS BY MOUTH  TWICE A DAY BEFORE MEALS IN THE MORNING AND EVENING    GUAIFENESIN (HUMIBID E) 400 MG TAB    Take 400 mg by mouth 3 (three) times daily as needed.    IMIQUIMOD (ALDARA) 5 % CREAM        LANCETS MISC    To check BG 2 times daily, to use with insurance preferred meter    LISINOPRIL-HYDROCHLOROTHIAZIDE (PRINZIDE,ZESTORETIC) 20-12.5 MG PER TABLET    TAKE 2 TABLETS BY MOUTH  ONCE DAILY    ONETOUCH DELBRET LANCETS 30 GAUGE MISC    USE TWO TIMES DAILY AND AS  NEEDED    ONETOUCH ULTRA  BLUE TEST STRIP STRP    USE TWO TIMES DAILY    ROSUVASTATIN (CRESTOR) 40 MG TAB    TAKE 1 TABLET BY MOUTH ONCE DAILY    WARFARIN (COUMADIN) 5 MG TABLET    TAKE 1 AND 1/2 TABLETS BY  MOUTH ON WEDNESDAY AND   AND 1 TABLET ALL  REMAINING DAYS AS DIRECTED  BY THE COUMADIN CLINIC.    WARFARIN (COUMADIN) 5 MG TABLET    Take 1 tablet (5 mg total) by mouth Daily.       Review of patient's allergies indicates:   Allergen Reactions    Sulfa (sulfonamide antibiotics)      Other reaction(s): Stomach upset    Sulfamethoxazole Rash    Trimethoprim Rash     Bactrim         Past Surgical History:   Procedure Laterality Date    CARDIAC ELECTROPHYSIOLOGY MAPPING AND ABLATION      POPLITEAL ARTERY STENT      RECTOPERITONEAL FISTULA CLOSURE      right leg bipass      THORACENTESIS Right 2018    Procedure: THORACENTESIS;  Surgeon: Princess Schaffer MD;  Location: [facility];  Service: Pulmonary;  Laterality: Right;    TOE SURGERY      Joint release    TONSILLECTOMY         History reviewed. No pertinent family history.    Social History     Socioeconomic History    Marital status:      Spouse name: Not on file    Number of children: Not on file    Years of education: Not on file    Highest education level: Not on file   Occupational History    Occupation: Retired      Comment: [id number]   Social Needs    Financial resource strain: Not on file    Food insecurity:     Worry: Not on file     Inability: Not on file    Transportation needs:     Medical: Not on file     Non-medical: Not on file   Tobacco Use    Smoking status: Former Smoker     Packs/day: 4.00     Years: 30.00     Pack years: 120.00     Types: Cigarettes     Last attempt to quit: 1994     Years since quittin.7    Smokeless tobacco: Never Used   Substance and Sexual Activity    Alcohol use: No    Drug use: No    Sexual activity: Not on file   Lifestyle    Physical activity:     Days per week: Not on file     Minutes  "per session: Not on file    Stress: Not on file   Relationships    Social connections:     Talks on phone: Not on file     Gets together: Not on file     Attends L[employer] service: Not on file     Active member of club or organization: Not on file     Attends meetings of clubs or organizations: Not on file     Relationship status: Not on file   Other Topics Concern    Not on file   Social History Narrative    Not on file       Vitals:    10/14/19 0809   BP: 132/62   Pulse: 77   Weight: 109.8 kg (242 lb)   Height: 5' 6" (1.676 m)   PainSc: 0-No pain   PainLoc: Foot       Hemoglobin A1C   Date Value Ref Range Status   11/23/2018 6.3 (H) 4.0 - 5.6 % Final     Comment:     ADA Screening Guidelines:  5.7-6.4%  Consistent with prediabetes  >or=6.5%  Consistent with diabetes  High levels of fetal hemoglobin interfere with the HbA1C  assay. Heterozygous hemoglobin variants (HbS, HgC, etc)do  not significantly interfere with this assay.   However, presence of multiple variants may affect accuracy.     05/23/2018 7.1 (H) 4.0 - 5.6 % Final     Comment:     According to ADA guidelines, hemoglobin A1c <7.0% represents  optimal control in non-pregnant diabetic patients. Different  metrics may apply to specific patient populations.   Standards of Medical Care in Diabetes-2016.  For the purpose of screening for the presence of diabetes:  <5.7%     Consistent with the absence of diabetes  5.7-6.4%  Consistent with increasing risk for diabetes   (prediabetes)  >or=6.5%  Consistent with diabetes  Currently, no consensus exists for use of hemoglobin A1c  for diagnosis of diabetes for children.  This Hemoglobin A1c assay has significant interference with fetal   hemoglobin   (HbF). The results are invalid for patients with abnormal amounts of   HbF,   including those with known Hereditary Persistence   of Fetal Hemoglobin. Heterozygous hemoglobin variants (HbAS, HbAC,   HbAD, HbAE, HbA2) do not significantly interfere with this " assay;   however, presence of multiple variants in a sample may impact the %   interference.     11/15/2017 6.8 (H) 4.0 - 5.6 % Final     Comment:     According to ADA guidelines, hemoglobin A1c <7.0% represents  optimal control in non-pregnant diabetic patients. Different  metrics may apply to specific patient populations.   Standards of Medical Care in Diabetes-2016.  For the purpose of screening for the presence of diabetes:  <5.7%     Consistent with the absence of diabetes  5.7-6.4%  Consistent with increasing risk for diabetes   (prediabetes)  >or=6.5%  Consistent with diabetes  Currently, no consensus exists for use of hemoglobin A1c  for diagnosis of diabetes for children.  This Hemoglobin A1c assay has significant interference with fetal   hemoglobin   (HbF). The results are invalid for patients with abnormal amounts of   HbF,   including those with known Hereditary Persistence   of Fetal Hemoglobin. Heterozygous hemoglobin variants (HbAS, HbAC,   HbAD, HbAE, HbA2) do not significantly interfere with this assay;   however, presence of multiple variants in a sample may impact the %   interference.         Review of Systems   Constitutional: Negative for chills and fever.   Respiratory: Negative for shortness of breath.    Cardiovascular: Negative for chest pain, palpitations, orthopnea, claudication and leg swelling.   Gastrointestinal: Negative for diarrhea, nausea and vomiting.   Musculoskeletal: Negative for joint pain.   Skin: Negative for rash.   Neurological: Positive for sensory change. Negative for dizziness, tingling, focal weakness and weakness.   Psychiatric/Behavioral: Negative.          Objective:      PHYSICAL EXAM: Apperance: Alert and orient in no distress,well developed, and with good attention to grooming and body habits  Patient presents ambulating in diabetic shoes and inserts.  Lower Extremity Exam  VASCULAR: Dorsalis pedis pulses 1/4 bilateral and Posterior Tibial pulses 1/4 bilateral.  Capillary fill time <4 seconds bilateral. No edema observed bilateral. Varicosities present bilateral. Skin temperature of the lower extremities is warm to warm, proximal to distal. Hair growth absent bilateral. (--) lymphangitis or (--) cellulitis noted bilateral.  DERMATOLOGICAL: (--) edema, (--) erythema, (--) malodor, (-) drainage, (--) warmth to left foot. Left hallux nail absent. Nails 2,3,4 left and 2,3,4,5 right elongated, thickened, and discolored with subungual debris. The dorsum surface of the feet are soft and supple.  The plantar aspects of both feet are dry and scaly. Webspaces 1,2,3,4 clean, dry and without evidence of break in skin integrity bilateral. Thin hyperkeratotic tissue noted to right partial hallux and plantar left 5th submetatarsal.   NEUROLOGICAL: Light touch, sharp-dull, proprioception all present and equal bilaterally.    MUSCULOSKELETAL: Muscle strength is 5/5 for foot inverters, everters, plantarflexors, and dorsiflexors. Muscle tone is normal. 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        Assessment:       Encounter Diagnoses   Name Primary?    Dermatophytosis of nail Yes    Pre-ulcerative calluses     PVD (peripheral vascular disease)     Type II diabetes mellitus with peripheral circulatory disorder          Plan:   Dermatophytosis of nail    Pre-ulcerative calluses    PVD (peripheral vascular disease)    Type II diabetes mellitus with peripheral circulatory disorder      I counseled the patient on his conditions, regarding findings of my examination, my impressions, and usual treatment plan.   Greater than 20 minutes spent on education about the PVD, and prevention of limb loss.  Shoe inspection. Patient instructed on proper foot hygeine. We discussed wearing proper shoe gear, daily foot inspections, never walking without protective shoe gear, never putting sharp instruments to feet.    With patient's permission, nails as noted above bilateral were trimmed in length and thickness  aggressively to their soft tissue attachment mechanically and with electric , removing all offending nail and debris. Patient relates relief following the procedure.  With patient's permission, bilateral callus trimmed in thickness with #15 blade in thickness without incident.   Patient  will continue to monitor the areas daily, inspect feet, wear protective shoe gear when ambulatory, moisturizer to maintain skin integrity.   Patient to return 2 months or sooner if needed.                  Nic Castillo DPM  [facility]

## 2019-12-16 ENCOUNTER — OFFICE VISIT (OUTPATIENT)
Dept: PODIATRY | Facility: CLINIC | Age: 79
End: 2019-12-16
Payer: MEDICARE

## 2019-12-16 VITALS
HEART RATE: 94 BPM | HEIGHT: 66 IN | BODY MASS INDEX: 38.89 KG/M2 | SYSTOLIC BLOOD PRESSURE: 97 MMHG | WEIGHT: 242 LBS | DIASTOLIC BLOOD PRESSURE: 55 MMHG

## 2019-12-16 DIAGNOSIS — B35.1 DERMATOPHYTOSIS OF NAIL: Primary | ICD-10-CM

## 2019-12-16 DIAGNOSIS — E11.51 TYPE II DIABETES MELLITUS WITH PERIPHERAL CIRCULATORY DISORDER: ICD-10-CM

## 2019-12-16 DIAGNOSIS — L84 PRE-ULCERATIVE CALLUSES: ICD-10-CM

## 2019-12-16 DIAGNOSIS — I73.9 PVD (PERIPHERAL VASCULAR DISEASE): ICD-10-CM

## 2019-12-16 PROCEDURE — 99999 PR PBB SHADOW E&M-EST. PATIENT-LVL III: ICD-10-PCS | Mod: PBBFAC,,, | Performed by: PODIATRIST

## 2019-12-16 PROCEDURE — 11721 DEBRIDE NAIL 6 OR MORE: CPT | Mod: 59,Q7,S$GLB, | Performed by: PODIATRIST

## 2019-12-16 PROCEDURE — 99999 PR PBB SHADOW E&M-EST. PATIENT-LVL III: CPT | Mod: PBBFAC,,, | Performed by: PODIATRIST

## 2019-12-16 PROCEDURE — 11056 PR TRIM BENIGN HYPERKERATOTIC SKIN LESION,2-4: ICD-10-PCS | Mod: Q7,S$GLB,, | Performed by: PODIATRIST

## 2019-12-16 PROCEDURE — 99499 NO LOS: ICD-10-PCS | Mod: S$GLB,,, | Performed by: PODIATRIST

## 2019-12-16 PROCEDURE — 11056 PARNG/CUTG B9 HYPRKR LES 2-4: CPT | Mod: Q7,S$GLB,, | Performed by: PODIATRIST

## 2019-12-16 PROCEDURE — 99499 UNLISTED E&M SERVICE: CPT | Mod: S$GLB,,, | Performed by: PODIATRIST

## 2019-12-16 PROCEDURE — 11721 PR DEBRIDEMENT OF NAILS, 6 OR MORE: ICD-10-PCS | Mod: 59,Q7,S$GLB, | Performed by: PODIATRIST

## 2019-12-16 NOTE — PROGRESS NOTES
Subjective:     Patient ID: Raymond Stuart is a 79 y.o. male.    Chief Complaint: Nail Care (no c/o pain. wears tennis shoes with socks. diabetic Pt. last seen on 11/12/19 with PCP Dr. Pak)    Raymond is a 79 y.o. male who presents to the clinic for evaluation and treatment of high risk feet. Raymond has a past medical history of A-fib, Diabetes mellitus, type 2, Hyperlipidemia, Hypertension, Irregular heartbeat, PVD (peripheral vascular disease), S/P femoral-popliteal bypass surgery (8/8/2014), and S/P peripheral artery angioplasty (8/8/2014). The patient's chief complaint is foot check and routine nail care.  This patient has documented high risk feet requiring routine maintenance secondary to peripheral vascular disease. Patient states he saw Dr. Judge a couple of weeks ago and there are no blockages noted. Patient state he has some skin cancer tags removed last week and his blood pressure has been running high and low. Patient states his home health nurse will be contacting Dr. Gomez today for an appointment.     PCP: Eran Gomez MD    Date Last Seen by PCP: 11/12/19    Current shoe gear:  Affected Foot: Rx diabetic extra depth shoes and custom accommodative insoles     Unaffected Foot: Rx diabetic extra depth shoes and custom accommodative insoles    History of Trauma: negative  Sign of Infection: none    Hemoglobin A1C   Date Value Ref Range Status   11/23/2018 6.3 (H) 4.0 - 5.6 % Final     Comment:     ADA Screening Guidelines:  5.7-6.4%  Consistent with prediabetes  >or=6.5%  Consistent with diabetes  High levels of fetal hemoglobin interfere with the HbA1C  assay. Heterozygous hemoglobin variants (HbS, HgC, etc)do  not significantly interfere with this assay.   However, presence of multiple variants may affect accuracy.     05/23/2018 7.1 (H) 4.0 - 5.6 % Final     Comment:     According to ADA guidelines, hemoglobin A1c <7.0% represents  optimal control in non-pregnant diabetic patients. Different  metrics may  apply to specific patient populations.   Standards of Medical Care in Diabetes-2016.  For the purpose of screening for the presence of diabetes:  <5.7%     Consistent with the absence of diabetes  5.7-6.4%  Consistent with increasing risk for diabetes   (prediabetes)  >or=6.5%  Consistent with diabetes  Currently, no consensus exists for use of hemoglobin A1c  for diagnosis of diabetes for children.  This Hemoglobin A1c assay has significant interference with fetal   hemoglobin   (HbF). The results are invalid for patients with abnormal amounts of   HbF,   including those with known Hereditary Persistence   of Fetal Hemoglobin. Heterozygous hemoglobin variants (HbAS, HbAC,   HbAD, HbAE, HbA2) do not significantly interfere with this assay;   however, presence of multiple variants in a sample may impact the %   interference.     11/15/2017 6.8 (H) 4.0 - 5.6 % Final     Comment:     According to ADA guidelines, hemoglobin A1c <7.0% represents  optimal control in non-pregnant diabetic patients. Different  metrics may apply to specific patient populations.   Standards of Medical Care in Diabetes-2016.  For the purpose of screening for the presence of diabetes:  <5.7%     Consistent with the absence of diabetes  5.7-6.4%  Consistent with increasing risk for diabetes   (prediabetes)  >or=6.5%  Consistent with diabetes  Currently, no consensus exists for use of hemoglobin A1c  for diagnosis of diabetes for children.  This Hemoglobin A1c assay has significant interference with fetal   hemoglobin   (HbF). The results are invalid for patients with abnormal amounts of   HbF,   including those with known Hereditary Persistence   of Fetal Hemoglobin. Heterozygous hemoglobin variants (HbAS, HbAC,   HbAD, HbAE, HbA2) do not significantly interfere with this assay;   however, presence of multiple variants in a sample may impact the %   interference.             Patient Active Problem List   Diagnosis    Hyperlipidemia associated  with type 2 diabetes mellitus    Essential hypertension    A-fib    PVD (peripheral vascular disease)    Special screening for malignant neoplasms, colon    S/P femoral-popliteal bypass surgery    S/P peripheral artery angioplasty    Type 2 diabetes mellitus with diabetic neuropathy    Osteomyelitis of toe of left foot    CKD (chronic kidney disease) stage 3, GFR 30-59 ml/min    Pleural effusion on right    Thrombocytopenia    Chronic diastolic heart failure    Acute on chronic respiratory failure    COPD (chronic obstructive pulmonary disease) with acute bronchitis    Lymphadenopathy, mediastinal    Respiratory failure, acute and chronic       Medication List with Changes/Refills   Current Medications    ALBUTEROL (PROVENTIL/VENTOLIN HFA) 90 MCG/ACTUATION INHALER    Inhale 1-2 puffs into the lungs every 4 (four) hours as needed for Wheezing or Shortness of Breath (cough).    ALBUTEROL-IPRATROPIUM (DUO-NEB) 2.5 MG-0.5 MG/3 ML NEBULIZER SOLUTION    Take 3 mLs by nebulization every 4 (four) hours as needed for Wheezing. Rescue    ATENOLOL (TENORMIN) 100 MG TABLET    TAKE 1 TABLET BY MOUTH ONCE DAILY    BLOOD-GLUCOSE METER KIT    To check BG 2 times daily, to use with insurance preferred meter    CLOPIDOGREL (PLAVIX) 75 MG TABLET    TAKE 1 TABLET BY MOUTH  DAILY    FENOFIBRATE MICRONIZED (LOFIBRA) 134 MG CAP    Take 1 capsule (134 mg total) by mouth once daily.    FLUOROURACIL (EFUDEX) 5 % CREAM        FUROSEMIDE (LASIX) 20 MG TABLET    Take 1 tablet (20 mg total) by mouth once daily.    GABAPENTIN (NEURONTIN) 600 MG TABLET    TAKE 2 TABLETS BY MOUTH TWO TIMES DAILY    GLIPIZIDE-METFORMIN (METAGLIP) 2.5-500 MG PER TABLET    TAKE 2 TABLETS BY MOUTH  TWICE A DAY BEFORE MEALS IN THE MORNING AND EVENING    GUAIFENESIN (HUMIBID E) 400 MG TAB    Take 400 mg by mouth 3 (three) times daily as needed.    IMIQUIMOD (ALDARA) 5 % CREAM        LANCETS MISC    To check BG 2 times daily, to use with insurance  preferred meter    LISINOPRIL-HYDROCHLOROTHIAZIDE (PRINZIDE,ZESTORETIC) 20-12.5 MG PER TABLET    TAKE 2 TABLETS BY MOUTH  ONCE DAILY    ONETOUCH DELICA LANCETS 30 GAUGE MISC    USE TWO TIMES DAILY AND AS  NEEDED    ONETOUCH ULTRA BLUE TEST STRIP STRP    USE TWO TIMES DAILY    ROSUVASTATIN (CRESTOR) 40 MG TAB    TAKE 1 TABLET BY MOUTH ONCE DAILY    WARFARIN (COUMADIN) 5 MG TABLET    TAKE 1 AND 1/2 TABLETS BY  MOUTH ON WEDNESDAY AND  SUNDAY AND 1 TABLET ALL  REMAINING DAYS AS DIRECTED  BY THE COUMADIN CLINIC.    WARFARIN (COUMADIN) 5 MG TABLET    Take 1 tablet (5 mg total) by mouth Daily.       Review of patient's allergies indicates:   Allergen Reactions    Sulfa (sulfonamide antibiotics)      Other reaction(s): Stomach upset    Sulfamethoxazole Rash    Trimethoprim Rash     Bactrim         Past Surgical History:   Procedure Laterality Date    CARDIAC ELECTROPHYSIOLOGY MAPPING AND ABLATION      POPLITEAL ARTERY STENT  2013    RECTOPERITONEAL FISTULA CLOSURE      right leg bipass      THORACENTESIS Right 12/20/2018    Procedure: THORACENTESIS;  Surgeon: Princess Schaffer MD;  Location: [facility];  Service: Pulmonary;  Laterality: Right;    TOE SURGERY      Joint release    TONSILLECTOMY         History reviewed. No pertinent family history.    Social History     Socioeconomic History    Marital status:      Spouse name: Not on file    Number of children: Not on file    Years of education: Not on file    Highest education level: Not on file   Occupational History    Occupation: Retired      Comment: [id number]   Social Needs    Financial resource strain: Not on file    Food insecurity:     Worry: Not on file     Inability: Not on file    Transportation needs:     Medical: Not on file     Non-medical: Not on file   Tobacco Use    Smoking status: Former Smoker     Packs/day: 4.00     Years: 30.00     Pack years: 120.00     Types: Cigarettes     Last attempt to quit: 1/9/1994     Years  "since quittin.9    Smokeless tobacco: Never Used   Substance and Sexual Activity    Alcohol use: No    Drug use: No    Sexual activity: Not on file   Lifestyle    Physical activity:     Days per week: Not on file     Minutes per session: Not on file    Stress: Not on file   Relationships    Social connections:     Talks on phone: Not on file     Gets together: Not on file     Attends Sikhism service: Not on file     Active member of club or organization: Not on file     Attends meetings of clubs or organizations: Not on file     Relationship status: Not on file   Other Topics Concern    Not on file   Social History Narrative    Not on file       Vitals:    19 0809   BP: (!) 97/55   Pulse: 94   Weight: 109.8 kg (242 lb)   Height: 5' 6" (1.676 m)   PainSc: 0-No pain   PainLoc: Foot       Hemoglobin A1C   Date Value Ref Range Status   2018 6.3 (H) 4.0 - 5.6 % Final     Comment:     ADA Screening Guidelines:  5.7-6.4%  Consistent with prediabetes  >or=6.5%  Consistent with diabetes  High levels of fetal hemoglobin interfere with the HbA1C  assay. Heterozygous hemoglobin variants (HbS, HgC, etc)do  not significantly interfere with this assay.   However, presence of multiple variants may affect accuracy.     2018 7.1 (H) 4.0 - 5.6 % Final     Comment:     According to ADA guidelines, hemoglobin A1c <7.0% represents  optimal control in non-pregnant diabetic patients. Different  metrics may apply to specific patient populations.   Standards of Medical Care in Diabetes-2016.  For the purpose of screening for the presence of diabetes:  <5.7%     Consistent with the absence of diabetes  5.7-6.4%  Consistent with increasing risk for diabetes   (prediabetes)  >or=6.5%  Consistent with diabetes  Currently, no consensus exists for use of hemoglobin A1c  for diagnosis of diabetes for children.  This Hemoglobin A1c assay has significant interference with fetal   hemoglobin   (HbF). The results are " invalid for patients with abnormal amounts of   HbF,   including those with known Hereditary Persistence   of Fetal Hemoglobin. Heterozygous hemoglobin variants (HbAS, HbAC,   HbAD, HbAE, HbA2) do not significantly interfere with this assay;   however, presence of multiple variants in a sample may impact the %   interference.     11/15/2017 6.8 (H) 4.0 - 5.6 % Final     Comment:     According to ADA guidelines, hemoglobin A1c <7.0% represents  optimal control in non-pregnant diabetic patients. Different  metrics may apply to specific patient populations.   Standards of Medical Care in Diabetes-2016.  For the purpose of screening for the presence of diabetes:  <5.7%     Consistent with the absence of diabetes  5.7-6.4%  Consistent with increasing risk for diabetes   (prediabetes)  >or=6.5%  Consistent with diabetes  Currently, no consensus exists for use of hemoglobin A1c  for diagnosis of diabetes for children.  This Hemoglobin A1c assay has significant interference with fetal   hemoglobin   (HbF). The results are invalid for patients with abnormal amounts of   HbF,   including those with known Hereditary Persistence   of Fetal Hemoglobin. Heterozygous hemoglobin variants (HbAS, HbAC,   HbAD, HbAE, HbA2) do not significantly interfere with this assay;   however, presence of multiple variants in a sample may impact the %   interference.         Review of Systems   Constitutional: Negative for chills and fever.   Respiratory: Negative for shortness of breath.    Cardiovascular: Negative for chest pain, palpitations, orthopnea, claudication and leg swelling.   Gastrointestinal: Negative for diarrhea, nausea and vomiting.   Musculoskeletal: Negative for joint pain.   Skin: Negative for rash.   Neurological: Positive for sensory change. Negative for dizziness, tingling, focal weakness and weakness.   Psychiatric/Behavioral: Negative.          Objective:      PHYSICAL EXAM: Apperance: Alert and orient in no distress,well  developed, and with good attention to grooming and body habits  Patient presents ambulating in diabetic shoes and inserts.  Lower Extremity Exam  VASCULAR: Dorsalis pedis pulses 1/4 bilateral and Posterior Tibial pulses 1/4 bilateral. Capillary fill time <4 seconds bilateral. No edema observed bilateral. Varicosities present bilateral. Skin temperature of the lower extremities is warm to warm, proximal to distal. Hair growth absent bilateral. (--) lymphangitis or (--) cellulitis noted bilateral.  DERMATOLOGICAL: (--) edema, (--) erythema, (--) malodor, (-) drainage, (--) warmth to left foot. Left hallux nail absent. Nails 2,3,4 left and 2,3,4,5 right elongated, thickened, and discolored with subungual debris. The dorsum surface of the feet are soft and supple.  The plantar aspects of both feet are dry and scaly. Webspaces 1,2,3,4 clean, dry and without evidence of break in skin integrity bilateral. Thin hyperkeratotic tissue noted to right partial hallux and plantar left 5th submetatarsal.   NEUROLOGICAL: Light touch, sharp-dull, proprioception all present and equal bilaterally.    MUSCULOSKELETAL: Muscle strength is 5/5 for foot inverters, everters, plantarflexors, and dorsiflexors. Muscle tone is normal. 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        Assessment:       Encounter Diagnoses   Name Primary?    Dermatophytosis of nail Yes    Pre-ulcerative calluses     PVD (peripheral vascular disease)     Type II diabetes mellitus with peripheral circulatory disorder          Plan:   Dermatophytosis of nail    Pre-ulcerative calluses    PVD (peripheral vascular disease)    Type II diabetes mellitus with peripheral circulatory disorder      I counseled the patient on his conditions, regarding findings of my examination, my impressions, and usual treatment plan.   Greater than 20 minutes spent on education about the PVD, and prevention of limb loss.  Shoe inspection. Patient instructed on proper foot hygeine. We discussed wearing  proper shoe gear, daily foot inspections, never walking without protective shoe gear, never putting sharp instruments to feet.    With patient's permission, nails as noted above bilateral were trimmed in length and thickness aggressively to their soft tissue attachment mechanically and with electric , removing all offending nail and debris. Patient relates relief following the procedure.  With patient's permission, bilateral callus trimmed in thickness with #15 blade in thickness without incident.   Patient to contact his PCP Dr. Gomez today concerning his blood pressure over the weekend and today.   Patient  will continue to monitor the areas daily, inspect feet, wear protective shoe gear when ambulatory, moisturizer to maintain skin integrity.   Patient to return 2 months or sooner if needed.                  Nic Castillo DPM  [facility]

## 2019-12-26 RX ORDER — GABAPENTIN 600 MG/1
TABLET ORAL
Qty: 360 TABLET | Refills: 3 | Status: SHIPPED | OUTPATIENT
Start: 2019-12-26

## 2020-01-24 ENCOUNTER — PATIENT MESSAGE (OUTPATIENT)
Dept: PODIATRY | Facility: CLINIC | Age: 80
End: 2020-01-24

## 2021-03-02 NOTE — PROGRESS NOTES
Pain currently resting comfortably in bed denies any pain or needs at this time. Post op vitals completed, all within defined limits. Will continue to monitor. Subjective:     Patient ID: Raymond Stuart is a 77 y.o. male.    Chief Complaint: Foot Problem (diabetic patient, left foot, patient )    Raymond is a 77 y.o. male who presents to the clinic for evaluation and treatment of high risk feet. Raymond has a past medical history of Diabetes mellitus, type 2; Hyperlipidemia; Hypertension; Irregular heartbeat; PVD (peripheral vascular disease); S/P femoral-popliteal bypass surgery (8/8/2014); and S/P peripheral artery angioplasty (8/8/2014). The patient's chief complaint is blister/loose nail on his left big toe. Patient states he applied the betadine as instructed and the skin peeled off some. Patient is accompnaied by his duahter today. Patients daughter states he was seen by Dr. Judge  and is scheduled for surgery due to blockage on this Friday. Patient denies any trauma or drainage from the toe.  This patient has documented high risk feet requiring routine maintenance secondary to peripheral vascular disease.    PCP: JOHNATHON Cristobal Jr, MD    Date Last Seen by PCP: 11/30/17    Current shoe gear:  Affected Foot: Rx diabetic extra depth shoes and custom accommodative insoles     Unaffected Foot: Rx diabetic extra depth shoes and custom accommodative insoles    History of Trauma: negative  Sign of Infection: none    Hemoglobin A1C   Date Value Ref Range Status   11/15/2017 6.8 (H) 4.0 - 5.6 % Final     Comment:     According to ADA guidelines, hemoglobin A1c <7.0% represents  optimal control in non-pregnant diabetic patients. Different  metrics may apply to specific patient populations.   Standards of Medical Care in Diabetes-2016.  For the purpose of screening for the presence of diabetes:  <5.7%     Consistent with the absence of diabetes  5.7-6.4%  Consistent with increasing risk for diabetes   (prediabetes)  >or=6.5%  Consistent with diabetes  Currently, no consensus exists for use of hemoglobin A1c  for diagnosis of diabetes for children.  This Hemoglobin A1c assay has  significant interference with fetal   hemoglobin   (HbF). The results are invalid for patients with abnormal amounts of   HbF,   including those with known Hereditary Persistence   of Fetal Hemoglobin. Heterozygous hemoglobin variants (HbAS, HbAC,   HbAD, HbAE, HbA2) do not significantly interfere with this assay;   however, presence of multiple variants in a sample may impact the %   interference.     05/16/2017 7.3 (H) 4.5 - 6.2 % Final     Comment:     According to ADA guidelines, hemoglobin A1C <7.0% represents  optimal control in non-pregnant diabetic patients.  Different  metrics may apply to specific populations.   Standards of Medical Care in Diabetes - 2016.  For the purpose of screening for the presence of diabetes:  <5.7%     Consistent with the absence of diabetes  5.7-6.4%  Consistent with increasing risk for diabetes   (prediabetes)  >or=6.5%  Consistent with diabetes  Currently no consensus exists for use of hemoglobin A1C  for diagnosis of diabetes for children.     11/09/2016 6.9 (H) 4.5 - 6.2 % Final     Comment:     According to ADA guidelines, hemoglobin A1C <7.0% represents  optimal control in non-pregnant diabetic patients.  Different  metrics may apply to specific populations.   Standards of Medical Care in Diabetes - 2016.  For the purpose of screening for the presence of diabetes:  <5.7%     Consistent with the absence of diabetes  5.7-6.4%  Consistent with increasing risk for diabetes   (prediabetes)  >or=6.5%  Consistent with diabetes  Currently no consensus exists for use of hemoglobin A1C  for diagnosis of diabetes for children.             Patient Active Problem List   Diagnosis    Hyperlipidemia associated with type 2 diabetes mellitus    Essential hypertension    A-fib    PVD (peripheral vascular disease)    Special screening for malignant neoplasms, colon    S/P femoral-popliteal bypass surgery    S/P peripheral artery angioplasty    Type 2 diabetes mellitus with  microalbuminuria    Type 2 diabetes mellitus with diabetic neuropathy    Osteomyelitis of toe of left foot       Medication List with Changes/Refills   Current Medications    ATENOLOL (TENORMIN) 100 MG TABLET    Take 1 tablet (100 mg total) by mouth once daily.    BLOOD-GLUCOSE METER KIT    To check BG 2 times daily, to use with insurance preferred meter    CLOPIDOGREL (PLAVIX) 75 MG TABLET    Take 1 tablet by mouth  daily    DAPTOMYCIN (CUBICIN) INJECTION        DIPHENHYDRAMINE (BENADRYL) 50 MG/ML INJECTION        FENOFIBRATE MICRONIZED (LOFIBRA) 134 MG CAP    Take 1 capsule (134 mg total) by mouth once daily.    FLUCONAZOLE (DIFLUCAN) 100 MG TABLET    Take 100 mg by mouth once daily.    GABAPENTIN (NEURONTIN) 600 MG TABLET    TAKE 2 TABLETS BY MOUTH TWO TIMES DAILY    GLIPIZIDE-METFORMIN (METAGLIP) 2.5-500 MG PER TABLET    Take 2 tablets by mouth  twice a day before meals in the morning and evening    LANCETS MISC    To check BG 2 times daily, to use with insurance preferred meter    LISINOPRIL-HYDROCHLOROTHIAZIDE (PRINZIDE,ZESTORETIC) 20-12.5 MG PER TABLET    Take 2 tablets by mouth once daily.    PREDNISONE (DELTASONE) 20 MG TABLET        ROSUVASTATIN (CRESTOR) 40 MG TAB    Take 1 tablet (40 mg total) by mouth once daily.    SANTYL OINTMENT        WARFARIN (COUMADIN) 5 MG TABLET    TAKE 1 AND 1/2 TABLETS BY  MOUTH ON WEDNESDAY AND  SUNDAY AND 1 TABLET ALL  REMAINING DAY AS DIRECTED  BY THE [facility]IN CLINIC.       Review of patient's allergies indicates:   Allergen Reactions    Sulfa (sulfonamide antibiotics)      Other reaction(s): Stomach upset    Sulfamethoxazole Rash    Trimethoprim Rash     Bactrim         Past Surgical History:   Procedure Laterality Date    CARDIAC ELECTROPHYSIOLOGY MAPPING AND ABLATION      POPLITEAL ARTERY STENT  2013    RECTOPERITONEAL FISTULA CLOSURE      TOE SURGERY      Joint release    TONSILLECTOMY         No family history on file.    Social History     Social History     "Marital status:      Spouse name: N/A    Number of children: N/A    Years of education: N/A     Occupational History    Retired       [employer]     Social History Main Topics    Smoking status: Former Smoker     Packs/day: 4.00     Years: 30.00     Types: Cigarettes     Quit date: 1/9/1994    Smokeless tobacco: Never Used    Alcohol use No    Drug use: No    Sexual activity: Not on file     Other Topics Concern    Not on file     Social History Narrative    No narrative on file       Vitals:    01/31/18 1524   BP: 137/78   Pulse: 109   Height: 5' 6" (1.676 m)   PainSc: 0-No pain       Hemoglobin A1C   Date Value Ref Range Status   11/15/2017 6.8 (H) 4.0 - 5.6 % Final     Comment:     According to ADA guidelines, hemoglobin A1c <7.0% represents  optimal control in non-pregnant diabetic patients. Different  metrics may apply to specific patient populations.   Standards of Medical Care in Diabetes-2016.  For the purpose of screening for the presence of diabetes:  <5.7%     Consistent with the absence of diabetes  5.7-6.4%  Consistent with increasing risk for diabetes   (prediabetes)  >or=6.5%  Consistent with diabetes  Currently, no consensus exists for use of hemoglobin A1c  for diagnosis of diabetes for children.  This Hemoglobin A1c assay has significant interference with fetal   hemoglobin   (HbF). The results are invalid for patients with abnormal amounts of   HbF,   including those with known Hereditary Persistence   of Fetal Hemoglobin. Heterozygous hemoglobin variants (HbAS, HbAC,   HbAD, HbAE, HbA2) do not significantly interfere with this assay;   however, presence of multiple variants in a sample may impact the %   interference.     05/16/2017 7.3 (H) 4.5 - 6.2 % Final     Comment:     According to ADA guidelines, hemoglobin A1C <7.0% represents  optimal control in non-pregnant diabetic patients.  Different  metrics may apply to specific populations.   Standards of Medical Care in " Diabetes - 2016.  For the purpose of screening for the presence of diabetes:  <5.7%     Consistent with the absence of diabetes  5.7-6.4%  Consistent with increasing risk for diabetes   (prediabetes)  >or=6.5%  Consistent with diabetes  Currently no consensus exists for use of hemoglobin A1C  for diagnosis of diabetes for children.     11/09/2016 6.9 (H) 4.5 - 6.2 % Final     Comment:     According to ADA guidelines, hemoglobin A1C <7.0% represents  optimal control in non-pregnant diabetic patients.  Different  metrics may apply to specific populations.   Standards of Medical Care in Diabetes - 2016.  For the purpose of screening for the presence of diabetes:  <5.7%     Consistent with the absence of diabetes  5.7-6.4%  Consistent with increasing risk for diabetes   (prediabetes)  >or=6.5%  Consistent with diabetes  Currently no consensus exists for use of hemoglobin A1C  for diagnosis of diabetes for children.         Review of Systems   Constitutional: Negative for chills and fever.   Respiratory: Negative for shortness of breath.    Cardiovascular: Negative for chest pain, palpitations, orthopnea, claudication and leg swelling.   Gastrointestinal: Negative for diarrhea, nausea and vomiting.   Musculoskeletal: Negative for joint pain.   Skin: Negative for rash.   Neurological: Positive for sensory change. Negative for dizziness, tingling, focal weakness and weakness.   Psychiatric/Behavioral: Negative.          Objective:      PHYSICAL EXAM: Apperance: Alert and orient in no distress,well developed, and with good attention to grooming and body habits  Patient presents ambulating in diabetic shoes and inserts.  Lower Extremity Exam  VASCULAR: Dorsalis pedis pulses 1/4 bilateral and Posterior Tibial pulses 1/4 bilateral. Capillary fill time <4 seconds bilateral. No edema observed bilateral. Varicosities present bilateral. Skin temperature of the lower extremities is warm to warm, proximal to distal. Hair growth  absent bilateral. (--) lymphangitis or (--) cellulitis noted bilateral.  DERMATOLOGICAL: (--) edema, (--) erythema, (--) malodor, (-) drainage, (--) warmth to left foot. Partiall avulsed left hallux nail. The dorsum surface of the feet are soft and supple.  The plantar aspects of both feet are dry and scaly. Webspaces 1,2,3,4 clean, dry and without evidence of break in skin integrity bilateral.   NEUROLOGICAL: Light touch, sharp-dull, proprioception all present and equal bilaterally.    MUSCULOSKELETAL: Muscle strength is 5/5 for foot inverters, everters, plantarflexors, and dorsiflexors. Muscle tone is normal.  Inspection/palpation of bone, joints and muscles unremarkable with the exception of that noted above. 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            Assessment:       Encounter Diagnoses   Name Primary?    PVD (peripheral vascular disease) Yes    Avulsion of toenail, subsequent encounter          Plan:   PVD (peripheral vascular disease)    Avulsion of toenail, subsequent encounter      I counseled the patient on his conditions, regarding findings of my examination, my impressions, and usual treatment plan.   Shoe inspection. Diabetic Foot Education. Patient reminded of the importance of good nutrition and blood sugar control to help prevent podiatric complications of diabetes. Patient instructed on proper foot hygeine. We discussed wearing proper shoe gear, daily foot inspections, never walking without protective shoe gear, never putting sharp instruments to feet.    Utilizing sterile toenail clippers I aggressively trimmed  the offending nail border approximately 3 mm from its edge and carried the nail plate incision down at an angle in order to wedge out the offending cryptotic portion of the nail plate. The offending border was then removed in toto. The remaining nail was grinded down with an electric  down to nail bed.  No blood was drawn. Patient tolerated the procedure well and related significant  relief.  Patient  will continue to monitor the areas daily, inspect feet, wear protective shoe gear when ambulatory, moisturizer to maintain skin integrity. Patient reminded of the importance of good nutrition and blood sugar control to help prevent podiatric complications of diabetes.  Patient to return 2 week or sooner if needed.                 Nic Castillo DPM  [facility]

## (undated) DEVICE — SOL 9P NACL IRR PIC IL

## (undated) DEVICE — SEE MEDLINE ITEM 157117

## (undated) DEVICE — POSITIONER HEAD DONUT 9IN FOAM

## (undated) DEVICE — DRAPE PLASTIC U 60X72

## (undated) DEVICE — SUPPORT ULNA NERVE PROTECTOR

## (undated) DEVICE — SEE MEDLINE ITEM 152622

## (undated) DEVICE — DECANTER VIAL ASEPTIC TRANSFER

## (undated) DEVICE — PAD CAST SPECIALIST STRL 4

## (undated) DEVICE — SEE MEDLINE ITEM 152522

## (undated) DEVICE — BLADE SCALP OPHTL RND TIP

## (undated) DEVICE — SYR 10CC LUER LOCK

## (undated) DEVICE — ELECTRODE REM PLYHSV RETURN 9

## (undated) DEVICE — SUT VICRYL PLUS 3-0 PS2 18

## (undated) DEVICE — DRAPE EXTREMITY W/ABC NON-SLIP

## (undated) DEVICE — GLOVE BIOGEL ECLIPSE SZ 6.5

## (undated) DEVICE — SPONGE DERMACEA GAUZE 4X4

## (undated) DEVICE — CONTAINER SPECIMEN STRL 4OZ

## (undated) DEVICE — SEE MEDLINE ITEM 157027

## (undated) DEVICE — BANDAGE DERMACEA STRETCH 4X1IN

## (undated) DEVICE — BLADE SURG #15 CARBON STEEL

## (undated) DEVICE — MANIFOLD 4 PORT

## (undated) DEVICE — BLADE LONG 31.0MM X 9.0MM

## (undated) DEVICE — APPLICATOR CHLORAPREP ORN 26ML

## (undated) DEVICE — LINER GLOVE POWDERFREE SZ 6.5

## (undated) DEVICE — STOCKINET TUBULAR 1 PLY 6X60IN

## (undated) DEVICE — NDL SAFETY 25G X 1.5 ECLIPSE

## (undated) DEVICE — NDL HYPODERMIC BLUNT 18G 1.5IN

## (undated) DEVICE — DRAPE SURG W/TWL 17 5/8X23

## (undated) DEVICE — GAUZE SPONGE 4X4 12PLY

## (undated) DEVICE — SUT ETHILON 3-0 PS2 18 BLK

## (undated) DEVICE — DRESSING N ADH OIL EMUL 3X3

## (undated) DEVICE — SUT VICRYL 4-0 ANTIBACT

## (undated) DEVICE — COVER OVERHEAD SURG LT BLUE

## (undated) DEVICE — SEE MEDLINE ITEM 146308

## (undated) DEVICE — TOURNIQUET SB QC DP 18X4IN

## (undated) DEVICE — SUT 4-0 ETHILON 18 PS-2